# Patient Record
Sex: FEMALE | Race: WHITE | Employment: OTHER | URBAN - METROPOLITAN AREA
[De-identification: names, ages, dates, MRNs, and addresses within clinical notes are randomized per-mention and may not be internally consistent; named-entity substitution may affect disease eponyms.]

---

## 2019-03-04 ENCOUNTER — OFFICE VISIT (OUTPATIENT)
Dept: INTERNAL MEDICINE CLINIC | Age: 84
End: 2019-03-04

## 2019-03-04 ENCOUNTER — HOSPITAL ENCOUNTER (OUTPATIENT)
Dept: LAB | Age: 84
Discharge: HOME | End: 2019-03-04
Payer: MEDICARE

## 2019-03-04 DIAGNOSIS — I10 ESSENTIAL HYPERTENSION: ICD-10-CM

## 2019-03-04 DIAGNOSIS — E03.9 ACQUIRED HYPOTHYROIDISM: ICD-10-CM

## 2019-03-04 DIAGNOSIS — Z96.651 S/P TOTAL KNEE REPLACEMENT, RIGHT: ICD-10-CM

## 2019-03-04 DIAGNOSIS — Z00.00 MEDICARE ANNUAL WELLNESS VISIT, SUBSEQUENT: Primary | ICD-10-CM

## 2019-03-04 DIAGNOSIS — N39.0 RECURRENT UTI: ICD-10-CM

## 2019-03-04 PROCEDURE — 84443 ASSAY THYROID STIM HORMONE: CPT

## 2019-03-04 PROCEDURE — 36415 COLL VENOUS BLD VENIPUNCTURE: CPT

## 2019-03-04 PROCEDURE — 84439 ASSAY OF FREE THYROXINE: CPT

## 2019-03-04 PROCEDURE — 85027 COMPLETE CBC AUTOMATED: CPT

## 2019-03-04 PROCEDURE — 80061 LIPID PANEL: CPT

## 2019-03-04 PROCEDURE — 80053 COMPREHEN METABOLIC PANEL: CPT

## 2019-03-04 RX ORDER — VERAPAMIL HYDROCHLORIDE 240 MG/1
TABLET, FILM COATED, EXTENDED RELEASE ORAL
COMMUNITY
End: 2019-12-16 | Stop reason: SDUPTHER

## 2019-03-04 RX ORDER — ESTRADIOL 0.1 MG/G
2 CREAM VAGINAL DAILY
COMMUNITY

## 2019-03-04 RX ORDER — LEVOTHYROXINE SODIUM 75 UG/1
TABLET ORAL
COMMUNITY
End: 2019-07-24 | Stop reason: SDUPTHER

## 2019-03-04 RX ORDER — TRIMETHOPRIM 100 MG/1
90 TABLET ORAL DAILY
COMMUNITY
End: 2020-02-05

## 2019-03-04 RX ORDER — TROSPIUM CHLORIDE 20 MG/1
20 TABLET, FILM COATED ORAL DAILY
COMMUNITY
End: 2020-02-05

## 2019-03-04 RX ORDER — LABETALOL 200 MG/1
TABLET, FILM COATED ORAL 2 TIMES DAILY
COMMUNITY
End: 2019-07-24 | Stop reason: SDUPTHER

## 2019-03-04 RX ORDER — FAMOTIDINE 20 MG/1
20 TABLET, FILM COATED ORAL DAILY
COMMUNITY
End: 2019-03-04

## 2019-03-04 NOTE — PATIENT INSTRUCTIONS
Medicare Wellness Visit, Female The best way to live healthy is to have a lifestyle where you eat a well-balanced diet, exercise regularly, limit alcohol use, and quit all forms of tobacco/nicotine, if applicable. Regular preventive services are another way to keep healthy. Preventive services (vaccines, screening tests, monitoring & exams) can help personalize your care plan, which helps you manage your own care. Screening tests can find health problems at the earliest stages, when they are easiest to treat. [facility] follows the current, evidence-based guidelines published by the [facility] ([facility]) when recommending preventive services for our patients. Because we follow these guidelines, sometimes recommendations change over time as research supports it. (For example, mammograms used to be recommended annually. Even though Medicare will still pay for an annual mammogram, the newer guidelines recommend a mammogram every two years for women of average risk.) Of course, you and your doctor may decide to screen more often for some diseases, based on your risk and your health status. Preventive services for you include: - Medicare offers their members a free annual wellness visit, which is time for you and your primary care provider to discuss and plan for your preventive service needs. Take advantage of this benefit every year! 
-All adults over the age of 72 should receive the recommended pneumonia vaccines. Current USPSTF guidelines recommend a series of two vaccines for the best pneumonia protection.  
-All adults should have a flu vaccine yearly and a tetanus vaccine every 10 years. All adults age 61 and older should receive a shingles vaccine once in their lifetime.   
-A bone mass density test is recommended when a woman turns 65 to screen for osteoporosis. This test is only recommended one time, as a screening. Some providers will use this same test as a disease monitoring tool if you already have osteoporosis. -All adults age 38-68 who are overweight should have a diabetes screening test once every three years.  
-Other screening tests and preventive services for persons with diabetes include: an eye exam to screen for diabetic retinopathy, a kidney function test, a foot exam, and stricter control over your cholesterol.  
-Cardiovascular screening for adults with routine risk involves an electrocardiogram (ECG) at intervals determined by your doctor.  
-Colorectal cancer screenings should be done for adults age 54-65 with no increased risk factors for colorectal cancer. There are a number of acceptable methods of screening for this type of cancer. Each test has its own benefits and drawbacks. Discuss with your doctor what is most appropriate for you during your annual wellness visit. The different tests include: colonoscopy (considered the best screening method), a fecal occult blood test, a fecal DNA test, and sigmoidoscopy. -Breast cancer screenings are recommended every other year for women of normal risk, age 54-69. 
-Cervical cancer screenings for women over age 72 are only recommended with certain risk factors.  
-All adults born between [facility] should be screened once for Hepatitis C. Here is a list of your current Health Maintenance items (your personalized list of preventive services) with a due date: 
Health Maintenance Due Topic Date Due  
 DTaP/Tdap/Td  (1 - Tdap) 08[birth year]01/1955  Pneumococcal Vaccine (1 of 2 - PCV13) 08/01/1999 [facility] Annual Well Visit  03/04/2019

## 2019-03-04 NOTE — PROGRESS NOTES
HISTORY OF PRESENT ILLNESS Josephine Kirby is a 80 y.o. female. HPI She just moved here from [place] ; she lives in [facility] Subjective:  
Josephine Kirby is a 80 y.o. female with hypertension. Hypertension ROS: taking medications as instructed, no medication side effects noted, no TIA's, no chest pain on exertion, no dyspnea on exertion, no swelling of ankles. New concerns: she takes two meds for BP . SUBJECTIVE: Josephine Kirby is a 80 y.o. female here for follow up of hypothyroidism. No results found for: TSH, TSH2, TSH3, TSHP, TSHEXT Thyroid ROS: denies fatigue, weight changes, heat/cold intolerance, bowel/skin changes or CVS symptoms. She keeps having recurrent UTI and saw Dayan Salcido - she is on meds for it and has follow up in April ; on vaginal cream, She has some swaying with walking but had her knee replaced a year ago. [facility] Review of Systems Constitutional: Negative. Negative for chills, diaphoresis, fever, malaise/fatigue and weight loss. HENT: Negative for congestion, nosebleeds and tinnitus. Eyes: Negative for blurred vision, double vision and photophobia. Respiratory: Negative for cough, hemoptysis, sputum production, shortness of breath and wheezing. Cardiovascular: Negative for chest pain, palpitations, orthopnea, claudication, leg swelling and PND. Gastrointestinal: Negative for abdominal pain, blood in stool, constipation, diarrhea, heartburn, melena, nausea and vomiting. Genitourinary: Negative for dysuria, frequency, hematuria and urgency. Musculoskeletal: Negative for back pain, joint pain, myalgias and neck pain. Skin: Negative for itching and rash. Neurological: Negative for dizziness, tingling, sensory change, speech change, focal weakness, weakness and headaches. Endo/Heme/Allergies: Negative for polydipsia. Does not bruise/bleed easily. Psychiatric/Behavioral: Negative for depression.  The patient is not nervous/anxious and does not have insomnia. Physical Exam  
Constitutional: She is oriented to person, place, and time. She appears well-developed and well-nourished. HENT:  
Head: Normocephalic and atraumatic. Right Ear: External ear normal.  
Left Ear: External ear normal.  
Nose: Nose normal.  
Mouth/Throat: Oropharynx is clear and moist.  
Neck: Normal range of motion. Neck supple. No JVD present. Carotid bruit is not present. No thyroid mass and no thyromegaly present. Cardiovascular: Normal rate, regular rhythm, S1 normal, S2 normal, normal heart sounds, intact distal pulses and normal pulses. Exam reveals no gallop and no friction rub. No murmur heard. Pulmonary/Chest: Effort normal and breath sounds normal.  
Abdominal: Soft. Bowel sounds are normal.  
Musculoskeletal: Normal range of motion. Neurological: She is alert and oriented to person, place, and time. She has normal strength. Skin: Skin is warm and dry. Psychiatric: She has a normal mood and affect. Her behavior is normal. Judgment and thought content normal.  
Nursing note and vitals reviewed. ASSESSMENT and PLAN Diagnoses and all orders for this visit: 1. Essential hypertension-at goal stay on current dose -     LIPID PANEL 
-     METABOLIC PANEL, COMPREHENSIVE 2. Acquired hypothyroidism-stable at current dose 
-     TSH 3RD GENERATION 
-     T4, FREE 3. Recurrent UTI- per  - cont current meds 
-     CBC W/O DIFF 4. S/P total knee replacement, right- referred to  
-     REFERRAL TO ORTHOPEDIC SURGERY 
 
 
lab results and schedule of future lab studies reviewed with patient 
reviewed diet, exercise and weight control 
cardiovascular risk and specific lipid/LDL goals reviewed 
reviewed medications and side effects in detail This is the Subsequent Medicare Annual Wellness Exam, performed 12 months or more after the Initial AWV or the last Subsequent AWV 
 
 I have reviewed the patient's medical history in detail and updated the computerized patient record. History Past Medical History:  
Diagnosis Date  Cataract   
 bilateral eyes  Frequent urinary tract infections  H/O: hysterectomy  Hypertension  Melanoma ([address]   
 leg  Thyroid disease Past Surgical History:  
Procedure Laterality Date  HX HERNIA REPAIR    
 x2  
 HX ORTHOPAEDIC    
 bilateral TKR  HX ORTHOPAEDIC    
 left arm fracture repair  IR CHOLECYSTOSTOMY PERCUTANEOUS Current Outpatient Medications Medication Sig Dispense Refill  verapamil ER (CALAN-SR) 240 mg CR tablet Take  by mouth nightly.  cranberry fruit concentrate (AZO CRANBERRY PO) Take  by mouth.  calcium carbonate 500 mg calcium (1,250 mg) tab 500 mg, ergocalciferol (vitamin d2) 400 unit tab 200 Units Take 2 Doses by mouth daily.  multivitamin, tx-iron-ca-min (THERA-M W/ IRON) 9 mg iron-400 mcg tab tablet Take 1 Tab by mouth daily.  labetalol (NORMODYNE) 200 mg tablet Take  by mouth two (2) times a day.  levothyroxine (SYNTHROID) 75 mcg tablet Take  by mouth Daily (before breakfast).  trimethoprim (TRIMPEX) 100 mg tablet Take 90 mg by mouth daily.  trospium (SANCTURA) 20 mg tablet Take 20 mg by mouth daily.  estradiol (ESTRACE) 0.01 % (0.1 mg/gram) vaginal cream Insert 2 g into vagina daily. Allergies Allergen Reactions  Adhesive Rash  Sulfa (Sulfonamide Antibiotics) Rash Family History Problem Relation Age of Onset  Cancer Father  Cancer Brother Social History Tobacco Use  Smoking status: Never Smoker  Smokeless tobacco: Never Used Substance Use Topics  Alcohol use: No  
  Frequency: Never There is no problem list on file for this patient. Depression Risk Factor Screening:  
 
3 most recent PHQ Screens 3/4/2019 Little interest or pleasure in doing things Not at all Feeling down, depressed, irritable, or hopeless Not at all Total Score PHQ 2 0 Alcohol Risk Factor Screening: You do not drink alcohol or very rarely. Functional Ability and Level of Safety:  
Hearing Loss Hearing is good. Activities of Daily Living The home contains: handrails and grab bars Patient does total self care Fall Risk Fall Risk Assessment, last 12 mths 3/4/2019 Able to walk? Yes Fall in past 12 months? No  
 
 
Abuse Screen Patient is not abused Cognitive Screening Evaluation of Cognitive Function: 
Has your family/caregiver stated any concerns about your memory: no 
Normal 
 
Patient Care Team  
No care team member to display Assessment/Plan Education and counseling provided: 
Are appropriate based on today's review and evaluation End-of-Life planning (with patient's consent) Diagnoses and all orders for this visit: 1. Essential hypertension -     LIPID PANEL 
-     METABOLIC PANEL, COMPREHENSIVE 2. Acquired hypothyroidism 
-     TSH 3RD GENERATION 
-     T4, FREE 3. Recurrent UTI 
-     CBC W/O DIFF 4. S/P total knee replacement, right 
-     REFERRAL TO ORTHOPEDIC SURGERY Health Maintenance Due Topic Date Due  
 DTaP/Tdap/Td series (1 - Tdap) 08/01/1955  Pneumococcal 65+ Low/Medium Risk (1 of 2 - PCV13) 08/01/1999  MEDICARE YEARLY EXAM  03/04/2019 This note will not be viewable in [address]

## 2019-03-05 LAB
ALBUMIN SERPL-MCNC: 4.5 G/DL (ref 3.5–4.7)
ALBUMIN/GLOB SERPL: 1.9 {RATIO} (ref 1.2–2.2)
ALP SERPL-CCNC: 65 IU/L (ref 39–117)
ALT SERPL-CCNC: 12 IU/L (ref 0–32)
AST SERPL-CCNC: 19 IU/L (ref 0–40)
BILIRUB SERPL-MCNC: 0.5 MG/DL (ref 0–1.2)
BUN SERPL-MCNC: 29 MG/DL (ref 8–27)
BUN/CREAT SERPL: 21 (ref 12–28)
CALCIUM SERPL-MCNC: 9.8 MG/DL (ref 8.7–10.3)
CHLORIDE SERPL-SCNC: 106 MMOL/L (ref 96–106)
CHOLEST SERPL-MCNC: 157 MG/DL (ref 100–199)
CO2 SERPL-SCNC: 24 MMOL/L (ref 20–29)
CREAT SERPL-MCNC: 1.35 MG/DL (ref 0.57–1)
ERYTHROCYTE [DISTWIDTH] IN BLOOD BY AUTOMATED COUNT: 14.7 % (ref 12.3–15.4)
GLOBULIN SER CALC-MCNC: 2.4 G/DL (ref 1.5–4.5)
GLUCOSE SERPL-MCNC: 95 MG/DL (ref 65–99)
HCT VFR BLD AUTO: 36.2 % (ref 34–46.6)
HDLC SERPL-MCNC: 51 MG/DL
HGB BLD-MCNC: 12.2 G/DL (ref 11.1–15.9)
INTERPRETATION, 910389: NORMAL
INTERPRETATION: NORMAL
LDLC SERPL CALC-MCNC: 90 MG/DL (ref 0–99)
MCH RBC QN AUTO: 31.8 PG (ref 26.6–33)
MCHC RBC AUTO-ENTMCNC: 33.7 G/DL (ref 31.5–35.7)
MCV RBC AUTO: 94 FL (ref 79–97)
PDF IMAGE, 910387: NORMAL
PLATELET # BLD AUTO: 235 X10E3/UL (ref 150–379)
POTASSIUM SERPL-SCNC: 4.5 MMOL/L (ref 3.5–5.2)
PROT SERPL-MCNC: 6.9 G/DL (ref 6–8.5)
RBC # BLD AUTO: 3.84 X10E6/UL (ref 3.77–5.28)
SODIUM SERPL-SCNC: 143 MMOL/L (ref 134–144)
T4 FREE SERPL-MCNC: 1.48 NG/DL (ref 0.82–1.77)
TRIGL SERPL-MCNC: 81 MG/DL (ref 0–149)
TSH SERPL DL<=0.005 MIU/L-ACNC: 1.11 UIU/ML (ref 0.45–4.5)
VLDLC SERPL CALC-MCNC: 16 MG/DL (ref 5–40)
WBC # BLD AUTO: 5.7 X10E3/UL (ref 3.4–10.8)

## 2019-04-22 ENCOUNTER — TELEPHONE (OUTPATIENT)
Dept: INTERNAL MEDICINE CLINIC | Age: 84
End: 2019-04-22

## 2019-04-22 NOTE — TELEPHONE ENCOUNTER
Contacted pt who advised she was a little better today but would like eval for discomfort.  Appt provided

## 2019-04-22 NOTE — TELEPHONE ENCOUNTER
Patient stated that she went out shopping over a week ago. States that she has not been able to walk since that time really & is very painful. Patient is wondering if Dr. Andreina Trinidad would like to see her for this or refer her to an ortho doctor. Patient also reports having knee replacement surgery in past.         Please call patient to advise.    [phone]

## 2019-04-24 ENCOUNTER — OFFICE VISIT (OUTPATIENT)
Dept: INTERNAL MEDICINE CLINIC | Age: 84
End: 2019-04-24

## 2019-04-24 VITALS
TEMPERATURE: 98 F | WEIGHT: 189.6 LBS | DIASTOLIC BLOOD PRESSURE: 75 MMHG | OXYGEN SATURATION: 98 % | BODY MASS INDEX: 34.89 KG/M2 | RESPIRATION RATE: 18 BRPM | HEART RATE: 60 BPM | HEIGHT: 62 IN | SYSTOLIC BLOOD PRESSURE: 125 MMHG

## 2019-04-24 DIAGNOSIS — M79.605 LEFT LEG PAIN: Primary | ICD-10-CM

## 2019-04-24 DIAGNOSIS — E03.9 ACQUIRED HYPOTHYROIDISM: ICD-10-CM

## 2019-04-24 DIAGNOSIS — I10 ESSENTIAL HYPERTENSION: ICD-10-CM

## 2019-04-24 NOTE — PROGRESS NOTES
HISTORY OF PRESENT ILLNESS Hattie Lainez is a 80 y.o. female. HPI Presents with . Hypertension ROS: taking medications as instructed, no medication side effects noted, no TIA's, no chest pain on exertion, no dyspnea on exertion, no swelling of ankles. New concerns:  Patient's BP in office today is 125/75. She tries to exercise 1-2x/week (stationary bike). hypothyroidism. Lab Results Component Value Date/Time TSH 1.110 03/04/2019 12:08 PM  
 
Thyroid ROS: denies fatigue, weight changes, heat/cold intolerance, bowel/skin changes or CVS symptoms. Pt continues on Synthroid. Left Leg Pain: Pt was on feet and shopped a lot on 4/19/19 which triggered pain in left hip radiating to left knee. She notes that pain has been improving with bed rest.   
 
 
Review of Systems All other systems reviewed and are negative. Physical Exam  
Constitutional: She is oriented to person, place, and time. She appears well-developed and well-nourished. HENT:  
Head: Normocephalic and atraumatic. Right Ear: External ear normal.  
Left Ear: External ear normal.  
Nose: Nose normal.  
Mouth/Throat: Oropharynx is clear and moist.  
Eyes: Conjunctivae and EOM are normal.  
Neck: Normal range of motion. Neck supple. Carotid bruit is not present. No thyroid mass and no thyromegaly present. Cardiovascular: Normal rate, regular rhythm, S1 normal, S2 normal, normal heart sounds and intact distal pulses. Pulmonary/Chest: Effort normal and breath sounds normal.  
Abdominal: Soft. Normal appearance and bowel sounds are normal. There is no hepatosplenomegaly. There is no tenderness. Musculoskeletal: Normal range of motion. Ambulates with rollator today in clinic. Left leg weaker than right but good ROM of knee and hip Neurological: She is alert and oriented to person, place, and time. She has normal strength. No cranial nerve deficit or sensory deficit.  Coordination normal.  
 Skin: Skin is warm, dry and intact. No abrasion and no rash noted. Psychiatric: She has a normal mood and affect. Her behavior is normal. Judgment and thought content normal.  
Nursing note and vitals reviewed. ASSESSMENT and PLAN Diagnoses and all orders for this visit: 1. Left leg pain Stable, improving condition. Pt will f/u for PT. Advised pt to take Tylenol, apply heat compress, and exercise more regularly (e.g. chair yoga). She is better today than last week but PT should help 2. Acquired hypothyroidism Thyroid stable. I do not recommend a change in medications. 3. Essential hypertension BP is at goal. I do not recommend any change in medications. Lab results and schedule of future lab studies reviewed with patient. Reviewed diet, exercise and weight control. Written by Lauren Méndez, as dictated by Johanny Torres MD.  
 
Current diagnosis and concerns discussed with pt at length. Understands risks and benefits or current treatment plan and medications and accepts the treatment and medication with any possible risks. Pt asks appropriate questions which were answered. Pt instructed to call with any concerns or problems. This note will not be viewable in [address]

## 2019-07-24 DIAGNOSIS — E03.9 ACQUIRED HYPOTHYROIDISM: ICD-10-CM

## 2019-07-24 DIAGNOSIS — I10 ESSENTIAL HYPERTENSION: ICD-10-CM

## 2019-07-24 RX ORDER — LABETALOL 200 MG/1
TABLET, FILM COATED ORAL
Qty: 180 TAB | Refills: 0 | Status: SHIPPED | OUTPATIENT
Start: 2019-07-24 | End: 2020-04-11

## 2019-07-24 RX ORDER — LEVOTHYROXINE SODIUM 75 UG/1
TABLET ORAL
Qty: 90 TAB | Refills: 1 | Status: SHIPPED | OUTPATIENT
Start: 2019-07-24 | End: 2020-02-07

## 2019-09-09 ENCOUNTER — HOSPITAL ENCOUNTER (OUTPATIENT)
Dept: LAB | Age: 84
Discharge: HOME | End: 2019-09-09
Payer: MEDICARE

## 2019-09-09 ENCOUNTER — OFFICE VISIT (OUTPATIENT)
Dept: INTERNAL MEDICINE CLINIC | Age: 84
End: 2019-09-09

## 2019-09-09 VITALS
WEIGHT: 191.6 LBS | BODY MASS INDEX: 35.26 KG/M2 | OXYGEN SATURATION: 98 % | DIASTOLIC BLOOD PRESSURE: 78 MMHG | HEIGHT: 62 IN | HEART RATE: 59 BPM | TEMPERATURE: 97.1 F | RESPIRATION RATE: 16 BRPM | SYSTOLIC BLOOD PRESSURE: 138 MMHG

## 2019-09-09 DIAGNOSIS — N39.0 RECURRENT UTI: ICD-10-CM

## 2019-09-09 DIAGNOSIS — I89.0 LYMPHEDEMA: ICD-10-CM

## 2019-09-09 DIAGNOSIS — I10 ESSENTIAL HYPERTENSION: Primary | ICD-10-CM

## 2019-09-09 DIAGNOSIS — E03.9 ACQUIRED HYPOTHYROIDISM: ICD-10-CM

## 2019-09-09 PROCEDURE — 84443 ASSAY THYROID STIM HORMONE: CPT

## 2019-09-09 PROCEDURE — 80053 COMPREHEN METABOLIC PANEL: CPT

## 2019-09-09 PROCEDURE — 84439 ASSAY OF FREE THYROXINE: CPT

## 2019-09-09 PROCEDURE — 36415 COLL VENOUS BLD VENIPUNCTURE: CPT

## 2019-09-09 RX ORDER — FAMOTIDINE 20 MG/1
20 TABLET, FILM COATED ORAL 2 TIMES DAILY
COMMUNITY

## 2019-09-09 NOTE — PROGRESS NOTES
HISTORY OF PRESENT ILLNESS  Robson Bailey is a 80 y.o. female. HPI Pt presents with daughter  Hypertension ROS: taking medications as instructed, no medication side effects noted, no TIA's, no chest pain on exertion, no dyspnea on exertion, no swelling of ankles. New concerns: BP in office today is 153/81. Her BP is 138/78 in exam room. She notes that she is not walking without her walker. She is scared of falling. She is considering trying fitness classes at [facility]. She thinks her BP is high due to the new food at [facility], since she used to eat plain food at home.    hypothyroidism. Lab Results   Component Value Date/Time    TSH 1.110 03/04/2019 12:08 PM     Thyroid ROS: denies fatigue, weight changes, heat/cold intolerance, bowel/skin changes or CVS symptoms. Recurrent UTI: Pt reports that she sees Dr. Michelle Reeves, and she started Pelvic floor therapy. She has been continuing with Estrogen cream 3x weekly. She notes that she has a UTI and is going to get ABx (per Dr. Michelle Reeves) today. Her GYN informed her that her recurrent UTI's could be due to displaced bowels after her bladder prolapsed. She is continuing with high fiber (fiber 1) diet and monitoring for improvement. Lymphedema: Pt reports that she went to Dr. Donavon Jacobs 1 month ago and he informed her that her knees were fine. He would like for pt to go to a lymphedema clinic. She is concerned about the conformability of leg wrapping. Review of Systems   All other systems reviewed and are negative. Physical Exam   Constitutional: She is oriented to person, place, and time. She appears well-developed and well-nourished. HENT:   Head: Normocephalic and atraumatic. Right Ear: External ear normal.   Left Ear: External ear normal.   Nose: Nose normal.   Mouth/Throat: Oropharynx is clear and moist.   Eyes: Pupils are equal, round, and reactive to light. Conjunctivae and EOM are normal.   Neck: Normal range of motion. Neck supple. Cardiovascular: Normal rate, regular rhythm, normal heart sounds and intact distal pulses. Pulmonary/Chest: Effort normal and breath sounds normal. Right breast exhibits no inverted nipple, no mass, no nipple discharge, no skin change and no tenderness. Left breast exhibits no inverted nipple, no mass, no nipple discharge, no skin change and no tenderness. No breast swelling, tenderness, discharge or bleeding. Breasts are symmetrical.   Abdominal: Soft. Bowel sounds are normal.   Genitourinary: Rectum normal and vagina normal. Rectal exam shows anal tone normal and guaiac negative stool. No breast swelling, tenderness, discharge or bleeding. Musculoskeletal: Normal range of motion. Neurological: She is alert and oriented to person, place, and time. Skin: Skin is warm and dry. Psychiatric: She has a normal mood and affect. Her behavior is normal. Judgment and thought content normal.   Nursing note and vitals reviewed. ASSESSMENT and PLAN  Diagnoses and all orders for this visit:    1. Essential hypertension  BP is at goal with Labetalol. I do not recommend any change in medications.   -     METABOLIC PANEL, COMPREHENSIVE    2. Acquired hypothyroidism  Thyroid stable with Synthroid. I do not recommend a change in medications.  -     TSH 3RD GENERATION  -     T4, FREE    3. Recurrent UTI  Stable and well-managed with Myrbetriq. No change in medications. 4. Lymphedema  Informed pt that the permanent treatment for lymphedema is compression wrappings. Will continue to monitor for improvements or changes. Additional comments: Informed pt that her cholesterol can be checked annually due to ideal results. Lab results and schedule of future lab studies reviewed with patient. Reviewed diet, exercise and weight control. Written by Sergio Person, as dictated by Ying Stewart MD.     Current diagnosis and concerns discussed with pt at length.  Understands risks and benefits or current treatment plan and medications and accepts the treatment and medication with any possible risks. Pt asks appropriate questions which were answered. Pt instructed to call with any concerns or problems. This note will not be viewable in [address]

## 2019-09-10 LAB
ALBUMIN SERPL-MCNC: 4.5 G/DL (ref 3.5–4.7)
ALBUMIN/GLOB SERPL: 2.1 {RATIO} (ref 1.2–2.2)
ALP SERPL-CCNC: 72 IU/L (ref 39–117)
ALT SERPL-CCNC: 12 IU/L (ref 0–32)
AST SERPL-CCNC: 18 IU/L (ref 0–40)
BILIRUB SERPL-MCNC: 0.8 MG/DL (ref 0–1.2)
BUN SERPL-MCNC: 29 MG/DL (ref 8–27)
BUN/CREAT SERPL: 21 (ref 12–28)
CALCIUM SERPL-MCNC: 10.1 MG/DL (ref 8.7–10.3)
CHLORIDE SERPL-SCNC: 103 MMOL/L (ref 96–106)
CO2 SERPL-SCNC: 22 MMOL/L (ref 20–29)
CREAT SERPL-MCNC: 1.39 MG/DL (ref 0.57–1)
GLOBULIN SER CALC-MCNC: 2.1 G/DL (ref 1.5–4.5)
GLUCOSE SERPL-MCNC: 86 MG/DL (ref 65–99)
INTERPRETATION: NORMAL
POTASSIUM SERPL-SCNC: 4.5 MMOL/L (ref 3.5–5.2)
PROT SERPL-MCNC: 6.6 G/DL (ref 6–8.5)
SODIUM SERPL-SCNC: 139 MMOL/L (ref 134–144)
T4 FREE SERPL-MCNC: 1.52 NG/DL (ref 0.82–1.77)
TSH SERPL DL<=0.005 MIU/L-ACNC: 2.03 UIU/ML (ref 0.45–4.5)

## 2019-10-20 ENCOUNTER — HOSPITAL ENCOUNTER (EMERGENCY)
Age: 84
Discharge: HOME | End: 2019-10-20
Attending: EMERGENCY MEDICINE
Payer: MEDICARE

## 2019-10-20 VITALS
SYSTOLIC BLOOD PRESSURE: 184 MMHG | DIASTOLIC BLOOD PRESSURE: 81 MMHG | TEMPERATURE: 98.1 F | OXYGEN SATURATION: 100 % | RESPIRATION RATE: 16 BRPM | HEART RATE: 64 BPM

## 2019-10-20 DIAGNOSIS — K22.2 ESOPHAGEAL OBSTRUCTION DUE TO FOOD IMPACTION: Primary | ICD-10-CM

## 2019-10-20 DIAGNOSIS — T18.128A ESOPHAGEAL OBSTRUCTION DUE TO FOOD IMPACTION: Primary | ICD-10-CM

## 2019-10-20 PROCEDURE — 99282 EMERGENCY DEPT VISIT SF MDM: CPT

## 2019-10-20 NOTE — ED NOTES
Patient discharged from ED by provider. Discharge instructions reviewed with patient and all questions answered. Patient ambulatory from ED in [facility].

## 2019-10-20 NOTE — ED TRIAGE NOTES
Pt reports \"I went out to eat around six o'clock this evening and it just feels like the food is clogged\". Pt able to talk in complete sentences w/o signs of resp distress.

## 2019-10-20 NOTE — ED PROVIDER NOTES
Dewey Correia is an 81 yo F with senstion of food stuck in her esophagus. She states that it occurred while eating dinner tonight. She has not been able to keep anything down since and has been spitting up her saliva. She originally called 911 and was in the ambulance being transported and she then felt the bumpy ride had moved the food and she asked them to take her back home but once she got home and tried to lie down the sensation returned and she continued to spit up her saliva.              Past Medical History:   Diagnosis Date    Cataract     bilateral eyes    Frequent urinary tract infections     H/O: hysterectomy     Hypertension     Melanoma ([facility] [address]     leg    Thyroid disease        Past Surgical History:   Procedure Laterality Date    HX HERNIA REPAIR      x2    HX ORTHOPAEDIC      bilateral TKR    HX ORTHOPAEDIC      left arm fracture repair    IR CHOLECYSTOSTOMY PERCUTANEOUS           Family History:   Problem Relation Age of Onset    Cancer Father     Cancer Brother        Social History     Socioeconomic History    Marital status:      Spouse name: Not on file    Number of children: Not on file    Years of education: Not on file    Highest education level: Not on file   Occupational History    Not on file   Social Needs    Financial resource strain: Not on file    Food insecurity:     Worry: Not on file     Inability: Not on file    Transportation needs:     Medical: Not on file     Non-medical: Not on file   Tobacco Use    Smoking status: Never Smoker    Smokeless tobacco: Never Used   Substance and Sexual Activity    Alcohol use: No     Frequency: Never    Drug use: No    Sexual activity: Not on file   Lifestyle    Physical activity:     Days per week: Not on file     Minutes per session: Not on file    Stress: Not on file   Relationships    Social connections:     Talks on phone: Not on file     Gets together: Not on file     Attends [employer] service: Not on file     Active member of club or organization: Not on file     Attends meetings of clubs or organizations: Not on file     Relationship status: Not on file    Intimate partner violence:     Fear of current or ex partner: Not on file     Emotionally abused: Not on file     Physically abused: Not on file     Forced sexual activity: Not on file   Other Topics Concern    Not on file   Social History Narrative    Not on file         ALLERGIES: Adhesive and Sulfa (sulfonamide antibiotics)    Review of Systems   Constitutional: Negative for fever. HENT: Positive for trouble swallowing. Negative for sore throat. Eyes: Negative for visual disturbance. Respiratory: Negative for cough. Cardiovascular: Negative for chest pain. Gastrointestinal: Negative for abdominal pain. Genitourinary: Negative for dysuria. Musculoskeletal: Negative for back pain. Skin: Negative for rash. Neurological: Negative for headaches. Vitals:    10/20/19 0109   BP: (!) 231/105   Pulse: 64   Resp: 16   Temp: 98.1 °F (36.7 °C)   SpO2: 100%            Physical Exam   Constitutional: She appears well-developed and well-nourished. No distress. Occasionally Spitting out saliva into tissue   HENT:   Head: Normocephalic and atraumatic. Mouth/Throat: Oropharynx is clear and moist.   Eyes: Conjunctivae and EOM are normal.   Neck: Normal range of motion and phonation normal.   Cardiovascular: Normal rate. Pulmonary/Chest: Effort normal. No respiratory distress. Abdominal: Soft. She exhibits no distension. There is no tenderness. Musculoskeletal: Normal range of motion. She exhibits no tenderness. Neurological: She is alert. She is not disoriented. She exhibits normal muscle tone. Skin: Skin is warm and dry. Nursing note and vitals reviewed. MDM        1:50 AM  PAtient reassessed and states that her symptoms have resolved after fizz-ease prior to receiving glucagon. Will hold glucagon and labs.  Give PO challenge. 2:06 AM  Patient tolerating crackers without difficulty. Discussed need for GI follow-up. Chewing food completely.       Procedures

## 2019-10-20 NOTE — ED NOTES
Patient given fizz-ease.  Patient reported that it didn't feel like first sip went down but the second one fixed the feeling of food in her throat

## 2019-11-04 ENCOUNTER — TELEPHONE (OUTPATIENT)
Dept: INTERNAL MEDICINE CLINIC | Age: 84
End: 2019-11-04

## 2019-11-04 NOTE — TELEPHONE ENCOUNTER
Patient called to report that she was seen by [facility] 10/20/2019 for esophagus obstruction. Patient reports that she was referred by ER to f/u and see DR. Dyer 128 & request to know if PCP recommends or can recommend another gastro doctor that is close to [facility] or DR. Carlton's location. Please call patient to advise.    [phone]

## 2019-11-05 NOTE — TELEPHONE ENCOUNTER
Spoke with patient and recommended Dr. Sincere Velarde or anyone with [facility]. Gave patient contact information. Pt understood and was thankful for the call.

## 2019-12-16 DIAGNOSIS — I10 ESSENTIAL HYPERTENSION: ICD-10-CM

## 2019-12-16 RX ORDER — VERAPAMIL HYDROCHLORIDE 240 MG/1
TABLET, FILM COATED, EXTENDED RELEASE ORAL
Qty: 90 TAB | Refills: 1 | Status: SHIPPED | OUTPATIENT
Start: 2019-12-16 | End: 2020-07-07

## 2020-02-05 NOTE — PERIOP NOTES
[address]  Endoscopy Preprocedure Instructions      1. On the day of your surgery, please report to registration located on the 2nd floor of the  [facility]. yes    2. You must have a responsible adult to drive you to the hospital, stay at the hospital during your procedure and drive you home. If they leave your procedure will not be started (no exceptions). yes    3. Do not have anything to eat or drink (including water, gum, mints, coffee, and juice) after midnight. This does not apply to the medications you were instructed to take by your physician. yesIf you are currently taking Plavix, Coumadin, Aspirin, or other blood-thinning agents, contact your physician for special instructions. yes,    4. If you are having a procedure that requires bowel prep: We recommend that you have only clear liquids the day before your procedure and begin your bowel prep by 5:00 pm.  You may continue to drink clear liquids until midnight. If for any reason you are not able to complete your prep please notify your physician immediately. not applicable    5. Have a list of all current medications, including vitamins, herbal supplements and any other over the counter medications. yes    6. If you wear glasses, contacts, dentures and/or hearing aids, they may be removed prior to procedure, please bring a case to store them in. yes    7. You should understand that if you do not follow these instructions your procedure may be cancelled. If your physical condition changes (I.e. fever, cold or flu) please contact your doctor as soon as possible. 8. It is important that you be on time.   If for any reason you are unable to keep your appointment please call [phone] the day of or your physicians office prior to your scheduled procedure

## 2020-02-06 ENCOUNTER — ANESTHESIA (OUTPATIENT)
Dept: ENDOSCOPY | Age: 85
End: 2020-02-06
Payer: MEDICARE

## 2020-02-06 ENCOUNTER — ANESTHESIA EVENT (OUTPATIENT)
Dept: ENDOSCOPY | Age: 85
End: 2020-02-06
Payer: MEDICARE

## 2020-02-06 ENCOUNTER — HOSPITAL ENCOUNTER (OUTPATIENT)
Age: 85
Setting detail: OUTPATIENT SURGERY
Discharge: HOME | End: 2020-02-06
Attending: INTERNAL MEDICINE | Admitting: INTERNAL MEDICINE
Payer: MEDICARE

## 2020-02-06 VITALS
TEMPERATURE: 97.8 F | BODY MASS INDEX: 35.66 KG/M2 | OXYGEN SATURATION: 99 % | RESPIRATION RATE: 17 BRPM | HEIGHT: 62 IN | DIASTOLIC BLOOD PRESSURE: 74 MMHG | SYSTOLIC BLOOD PRESSURE: 128 MMHG | HEART RATE: 53 BPM | WEIGHT: 193.78 LBS

## 2020-02-06 PROCEDURE — C1726 CATH, BAL DIL, NON-VASCULAR: HCPCS | Performed by: INTERNAL MEDICINE

## 2020-02-06 PROCEDURE — 74011000250 HC RX REV CODE- 250: Performed by: NURSE ANESTHETIST, CERTIFIED REGISTERED

## 2020-02-06 PROCEDURE — 74011250636 HC RX REV CODE- 250/636: Performed by: NURSE ANESTHETIST, CERTIFIED REGISTERED

## 2020-02-06 PROCEDURE — 74011250636 HC RX REV CODE- 250/636: Performed by: INTERNAL MEDICINE

## 2020-02-06 PROCEDURE — 76040000019: Performed by: INTERNAL MEDICINE

## 2020-02-06 PROCEDURE — 76060000031 HC ANESTHESIA FIRST 0.5 HR: Performed by: INTERNAL MEDICINE

## 2020-02-06 PROCEDURE — 77030018712 HC DEV BLLN INFL BSC -B: Performed by: INTERNAL MEDICINE

## 2020-02-06 RX ORDER — FLUMAZENIL 0.1 MG/ML
0.2 INJECTION INTRAVENOUS
Status: DISCONTINUED | OUTPATIENT
Start: 2020-02-06 | End: 2020-02-06 | Stop reason: HOSPADM

## 2020-02-06 RX ORDER — PROPOFOL 10 MG/ML
INJECTION, EMULSION INTRAVENOUS AS NEEDED
Status: DISCONTINUED | OUTPATIENT
Start: 2020-02-06 | End: 2020-02-06 | Stop reason: HOSPADM

## 2020-02-06 RX ORDER — ATROPINE SULFATE 0.1 MG/ML
0.4 INJECTION INTRAVENOUS
Status: DISCONTINUED | OUTPATIENT
Start: 2020-02-06 | End: 2020-02-06 | Stop reason: HOSPADM

## 2020-02-06 RX ORDER — SODIUM CHLORIDE 9 MG/ML
50 INJECTION, SOLUTION INTRAVENOUS CONTINUOUS
Status: DISCONTINUED | OUTPATIENT
Start: 2020-02-06 | End: 2020-02-06 | Stop reason: HOSPADM

## 2020-02-06 RX ORDER — PROPOFOL 10 MG/ML
INJECTION, EMULSION INTRAVENOUS
Status: DISCONTINUED | OUTPATIENT
Start: 2020-02-06 | End: 2020-02-06 | Stop reason: HOSPADM

## 2020-02-06 RX ORDER — NALOXONE HYDROCHLORIDE 0.4 MG/ML
0.4 INJECTION, SOLUTION INTRAMUSCULAR; INTRAVENOUS; SUBCUTANEOUS
Status: DISCONTINUED | OUTPATIENT
Start: 2020-02-06 | End: 2020-02-06 | Stop reason: HOSPADM

## 2020-02-06 RX ORDER — EPINEPHRINE 0.1 MG/ML
1 INJECTION INTRACARDIAC; INTRAVENOUS
Status: DISCONTINUED | OUTPATIENT
Start: 2020-02-06 | End: 2020-02-06 | Stop reason: HOSPADM

## 2020-02-06 RX ORDER — MIDAZOLAM HYDROCHLORIDE 1 MG/ML
.25-5 INJECTION, SOLUTION INTRAMUSCULAR; INTRAVENOUS
Status: DISCONTINUED | OUTPATIENT
Start: 2020-02-06 | End: 2020-02-06 | Stop reason: HOSPADM

## 2020-02-06 RX ORDER — LIDOCAINE HYDROCHLORIDE 20 MG/ML
INJECTION, SOLUTION EPIDURAL; INFILTRATION; INTRACAUDAL; PERINEURAL AS NEEDED
Status: DISCONTINUED | OUTPATIENT
Start: 2020-02-06 | End: 2020-02-06 | Stop reason: HOSPADM

## 2020-02-06 RX ORDER — DEXTROMETHORPHAN/PSEUDOEPHED 2.5-7.5/.8
1.2 DROPS ORAL
Status: DISCONTINUED | OUTPATIENT
Start: 2020-02-06 | End: 2020-02-06 | Stop reason: HOSPADM

## 2020-02-06 RX ADMIN — SODIUM CHLORIDE 50 ML/HR: 900 INJECTION, SOLUTION INTRAVENOUS at 11:00

## 2020-02-06 RX ADMIN — LIDOCAINE HYDROCHLORIDE 40 MG: 20 INJECTION, SOLUTION INTRAVENOUS at 12:10

## 2020-02-06 RX ADMIN — PROPOFOL 140 MCG/KG/MIN: 10 INJECTION, EMULSION INTRAVENOUS at 12:11

## 2020-02-06 RX ADMIN — PROPOFOL 80 MG: 10 INJECTION, EMULSION INTRAVENOUS at 12:11

## 2020-02-06 NOTE — ANESTHESIA PREPROCEDURE EVALUATION
Relevant Problems   No relevant active problems       Anesthetic History   No history of anesthetic complications            Review of Systems / Medical History  Patient summary reviewed, nursing notes reviewed and pertinent labs reviewed    Pulmonary        Sleep apnea: CPAP           Neuro/Psych   Within defined limits           Cardiovascular    Hypertension                   GI/Hepatic/Renal                Endo/Other      Hypothyroidism  Morbid obesity and cancer (MELANOMA LEG)     Other Findings              Physical Exam    Airway  Mallampati: III  TM Distance: 4 - 6 cm  Neck ROM: normal range of motion   Mouth opening: Normal     Cardiovascular    Rhythm: regular  Rate: normal         Dental  No notable dental hx       Pulmonary  Breath sounds clear to auscultation               Abdominal         Other Findings            Anesthetic Plan    ASA: 3  Anesthesia type: MAC          Induction: Intravenous  Anesthetic plan and risks discussed with: Patient

## 2020-02-06 NOTE — PERIOP NOTES
1211  Anesthesia staff at patient's bedside administering anesthesia and monitoring patients vital signs throughout procedure. See anesthesia note. 46  Endoscope was pre-cleaned at bedside immediately following procedure by Heather Villar, JESSE, endo tech. 1222  Patient tolerated procedure without problems. Abdomen soft and patient arousable and voices no complaints Report received from CRNA, see anesthesia note. Patient transported to endoscopy recovery area. Report given to ISABELLA SORENSEN RN. CRE balloon dilatation of the esophagus   18 mm Balloon inflated to 3 ATMs and held for 4 seconds. 19 mm Balloon inflated to 4.5 ATMs and held for 10 seconds. 20 mm Balloon inflated to 6 ATMs and held for 10 seconds. No subcutaneous crepitus of the chest or cervical region was noted post dilatation.

## 2020-02-06 NOTE — PROCEDURES
Jorge Hager M.D.  [phone]           2020                EGD Operative Report  Claudius Gowers  :  1934  [facility] Medical Record Number:  [id number]      Indication:  Dysphagia/odynophagia     : Pete Ibanez MD    Referring Provider:  Toby Guerrero MD      Anesthesia/Sedation:  MAC anesthesia    Airway assessment: No airway problems anticipated    Pre-Procedural Exam:      Airway: clear, no airway problems anticipated  Heart: RRR, without gallops or rubs  Lungs: clear bilaterally without wheezes, crackles, or rhonchi  Abdomen: soft, nontender, nondistended, bowel sounds present  Mental Status: awake, alert and oriented to person, place and time       Procedure Details     After infomed consent was obtained for the procedure, with all risks and benefits of procedure explained the patient was taken to the endoscopy suite and placed in the left lateral decubitus position. Following sequential administration of sedation as per above, the endoscope was inserted into the mouth and advanced under direct vision to second portion of the duodenum. A careful inspection was made as the gastroscope was withdrawn, including a retroflexed view of the proximal stomach; findings and interventions are described below. Findings:   Esophagus:Evidence of a patent schatzki's ring about 15 mm in diameter, otherwise mucosa within normal  Stomach: Small size hiatal hernia, otherwise mucosa within normal  Duodenum/jejunum: normal    Therapies:  esophageal dilation with 18 to 20 mm  sized balloon    Specimens: none           Complications:   None; patient tolerated the procedure well. EBL:  None.            Impression:    Hiatal hernia and schatzki's ring that was dilated to 20 mm     Recommendations:    -Continue acid suppression.  -Continue with soft diet  -Continue with anti-reflux measures    Bharat Landeros MD

## 2020-02-06 NOTE — PROGRESS NOTES
Sandy Mosquera  1934  [id number]    Situation:  Verbal report received from:   Lindsey Patrick RN   Procedure: Procedure(s):  ESOPHAGOGASTRODUODENOSCOPY (EGD)    Background:    Preoperative diagnosis: DYSPHAGIA  Postoperative diagnosis: * No post-op diagnosis entered *    :  Dr. Loyd Valle  Assistant(s): Endoscopy RN-1: Yenny Burgos RN    Specimens: * No specimens in log *  H. Pylori  no    Assessment:  Intra-procedure medications       Anesthesia gave intra-procedure sedation and medications, see anesthesia flow sheet yes    Intravenous fluids: NS@ KVO     Vital signs stable   yes    Abdominal assessment: round and soft   yes    Recommendation:  Discharge patient per MD order  yes.   Return to floor outpatient   Family or Friend   Spouse and daughter   Permission to share finding with family or friend yes

## 2020-02-06 NOTE — H&P
The patient is a 80year old female who presents with a complaint of Dysphagia. The patient presents for consultation at the request of Dr. Yanira Seo). The onset of the dysphagia has been chronic and has been occurring for years. The dysphagia is described as being located in the substernal  area. The symptoms are aggravated by solids only. Note for \"Dysphagia\": She was in the ER in October 2019 for similar symptoms and resolved with fizzy drink. She denies a long history of GERD, was given famotidine in the past but not lately. She does not think she had an endoscopy done but thinks she had it with her colonoscopy. She has had colonoscopies done in the past. She denies any regurgitation or nausea or vomiting, denies any weight loss. Problem List/Past Medical Get Pruitt; 1/30/2020 2:09 PM)  Hypertension    Hypothyroidism      Allergies (Get Pruitt; 1/30/2020 2:09 PM)  Sulfa Drugs    Adheres Underpad *MEDICAL DEVICES AND SUPPLIES*      Medication History (Get Pruitt; 1/30/2020 2:12 PM)  Verapamil HCl  (240MG (CO) Tablet ER, Oral daily) Active. Myrbetriq  (50MG Tablet ER 24HR, Oral daily) Active. Oscal 500/200 D-3  (500-200MG-UNIT Tablet, Oral daily) Active. Centrum Adults  (Oral daily) Active. Labetalol HCl  (200MG Tablet, Oral daily) Active. Levothyroxine Sodium  (75MCG Tablet, Oral daily) Active. Medications Reconciled     Social History Get Pruitt; 1/30/2020 2:12 PM)  Blood Transfusion   No.  Drug Use   no  Alcohol Use   no  Tobacco Use   no  Marital status   . Health Maintenance History Get Pruitt; 1/30/2020 2:12 PM)  Flu Vaccine   yes  Pneumovax   no        Review of Systems Get Pruitt; 1/30/2020 2:05 PM)  General Not Present- Chronic Fatigue, Poor Appetite, Weight Gain and Weight Loss. Skin Not Present- Itching, Rash and Skin Color Changes. HEENT Not Present- Hearing Loss and Vertigo.   Respiratory Not Present- Difficulty Breathing and TB exposure. Cardiovascular Not Present- Chest Pain, Use of Antibiotics before Dental Procedures and Use of Blood Thinners. Gastrointestinal Present- See HPI. Musculoskeletal Not Present- Arthritis, Hip Replacement Surgery and Knee Replacement Surgery. Neurological Not Present- Weakness. Psychiatric Not Present- Depression. Endocrine Not Present- Diabetes and Thyroid Problems. Hematology Not Present- Anemia. Vitals Surekha Szymanski; 1/30/2020 2:04 PM)  1/30/2020 2:03 PM  Weight: 195 lb   Height: 62 in   Body Surface Area: 1.89 m²   Body Mass Index: 35.67 kg/m²    BP: 160/82 (Sitting, Left Arm, Standard)              Physical Exam (Bharat Casas MD; 1/30/2020 2:36 PM)  General  Mental Status - Alert. General Appearance - Cooperative, Pleasant, Not in acute distress. Orientation - Oriented X3. Build & Nutrition - Well nourished and Well developed. Integumentary  General Characteristics  Overall examination of the patient's skin reveals - no rashes, no bruises and no spider angiomas. Color - normal coloration of skin. Head and Neck  Neck  Global Assessment - full range of motion and supple, no bruit auscultated on the right, no bruit auscultated on the left, non-tender, no lymphadenopathy. Thyroid  Gland Characteristics - normal size and consistency. Eye  Eyeball - Left - No Exophthalmos. Eyeball - Right - No Exophthalmos. Sclera/Conjunctiva - Left - No Jaundice. Sclera/Conjunctiva - Right - No Jaundice. Chest and Lung Exam  Chest and lung exam reveals  - quiet, even and easy respiratory effort with no use of accessory muscles. Auscultation  Breath sounds - Normal. Adventitious sounds - No Adventitious sounds. Cardiovascular  Auscultation  Rhythm - Regular, No Tachycardia, No Bradycardia . Heart Sounds - Normal heart sounds , S1 WNL and S2 WNL, No S3, No Summation Gallop. Murmurs & Other Heart Sounds - Auscultation of the heart reveals - No Murmurs.     Abdomen  Inspection  Inspection of the abdomen reveals - Non-distended. Palpation/Percussion  Tenderness - Non-Tender. Rebound tenderness - No rebound. Liver - No hepatosplenomegaly. Abdominal Mass Palpable - No masses. Other Characteristics - No Ascites. Organomegally - None. Auscultation  Auscultation of the abdomen reveals - Bowel sounds normal, No Abdominal bruits and No Succussion splash. Rectal - Did not examine. Peripheral Vascular  Upper Extremity  Inspection - Left - Normal - No Clubbing, No Cyanosis, No Edema, Pulses Intact. Right - Normal - No Clubbing, No Cyanosis, No Edema, Pulses Intact. Palpation - Edema - Left - No edema. Right - No edema. Lower Extremity  Inspection - Left - Inspection Normal. Right - Inspection Normal. Palpation - Edema - Left - No edema. Right - No edema. Neurologic  Neurologic evaluation reveals  - Cranial nerves grossly intact, no focal neurologic deficits. Motor  Involuntary Movements - Asterixis - not present. Musculoskeletal  Global Assessment  Gait and Station - normal gait and station. Assessment & Plan (Bharat Valle MD; 1/30/2020 2:39 PM)  Dysphagia (787.20  R13.10)  Impression: Suspected schatzki's ring, will proceed with EGD and dilation. Advised her to resume pepcid. Instructed to eat slowly, chew food very well and have sips of water with meals. Current Plans  ENDOSCOPY, UPPER GI, DIAGNOSTIC (97698) (Discussed risks and benefits with the patient to include: perforation,or post biopsy bleeding, missed lesions, and sedation reactions.)  Pt Education - How to access health information online: discussed with patient and provided information. Patient is to call me for any questions or concerns.

## 2020-02-06 NOTE — ANESTHESIA POSTPROCEDURE EVALUATION
Procedure(s):  ESOPHAGOGASTRODUODENOSCOPY (EGD)  ESOPHAGEAL DILATION. MAC    Anesthesia Post Evaluation      Multimodal analgesia: multimodal analgesia not used between 6 hours prior to anesthesia start to PACU discharge  Patient location during evaluation: bedside  Patient participation: complete - patient participated  Level of consciousness: awake  Pain management: adequate  Airway patency: patent  Anesthetic complications: no  Cardiovascular status: acceptable  Respiratory status: acceptable  Hydration status: acceptable  Post anesthesia nausea and vomiting:  controlled      Vitals Value Taken Time   /65 2/6/2020 12:45 PM   Temp 36.6 °C (97.8 °F) 2/6/2020 12:25 PM   Pulse 53 2/6/2020 12:48 PM   Resp 16 2/6/2020 12:48 PM   SpO2 97 % 2/6/2020 12:48 PM   Vitals shown include unvalidated device data.

## 2020-02-06 NOTE — DISCHARGE INSTRUCTIONS
Danette Saint, M.D.  [phone]           2020  Mervin Brunner  :  1934  [facility] Medical Record Number:  [id number]        ENDOSCOPY FINDINGS:   Your endoscopy showed a hiatal hernia and a benign narrowing that was dilated. EGD DISCHARGE INSTRUCTIONS    DISCOMFORT:  Sore throat- throat lozenges or warm salt water gargle  redness at IV site- apply warm compress to area; if redness or soreness persist- contact your physician  Gaseous discomfort- walking, belching will help relieve any discomfort  You may not operate a vehicle for 12 hours  You may not engage in an occupation involving machinery or appliances for rest of today  You may not drink alcoholic beverages for at least 12 hours  Avoid making any critical decisions for at least 24 hour    DIET:   You may resume pureed diet today and advance to soft diet tomorrow. ACTIVITY  Spend the remainder of the day resting -  avoid any strenuous activity. Avoid driving or operating machinery. CALL M.D. ANY SIGN OF   Increasing pain, nausea, vomiting  Abdominal distension (swelling)  New increased bleeding (oral or rectal)  Fever (chills)  Pain in chest area  Bloody discharge from nose or mouth  Shortness of breath    Follow-up Instructions:   Call Dr. Beni Graham for any questions or problems. Telephone # [phone]     Continue same medications. Follow up in the office as needed.

## 2020-02-07 DIAGNOSIS — E03.9 ACQUIRED HYPOTHYROIDISM: ICD-10-CM

## 2020-02-07 RX ORDER — LEVOTHYROXINE SODIUM 75 UG/1
TABLET ORAL
Qty: 90 TAB | Refills: 1 | Status: SHIPPED | OUTPATIENT
Start: 2020-02-07 | End: 2020-07-31

## 2020-02-28 ENCOUNTER — OFFICE VISIT (OUTPATIENT)
Dept: INTERNAL MEDICINE CLINIC | Age: 85
End: 2020-02-28

## 2020-02-28 DIAGNOSIS — J06.9 VIRAL UPPER RESPIRATORY TRACT INFECTION: ICD-10-CM

## 2020-02-28 DIAGNOSIS — R68.89 FLU-LIKE SYMPTOMS: Primary | ICD-10-CM

## 2020-02-28 LAB
FLUAV+FLUBV AG NOSE QL IA.RAPID: NEGATIVE POS/NEG
FLUAV+FLUBV AG NOSE QL IA.RAPID: NEGATIVE POS/NEG
VALID INTERNAL CONTROL?: YES

## 2020-02-28 RX ORDER — AZITHROMYCIN 250 MG/1
250 TABLET, FILM COATED ORAL SEE ADMIN INSTRUCTIONS
Qty: 6 TAB | Refills: 0 | Status: SHIPPED | OUTPATIENT
Start: 2020-02-28 | End: 2020-03-04

## 2020-02-28 NOTE — PATIENT INSTRUCTIONS
Viral Respiratory Infection: Care Instructions Your Care Instructions Viruses are very small organisms. They grow in number after they enter your body. There are many types that cause different illnesses, such as colds and the mumps. The symptoms of a viral respiratory infection often start quickly. They include a fever, sore throat, and runny nose. You may also just not feel well. Or you may not want to eat much. Most viral respiratory infections are not serious. They usually get better with time and self-care. Antibiotics are not used to treat a viral infection. That's because antibiotics will not help cure a viral illness. In some cases, antiviral medicine can help your body fight a serious viral infection. Follow-up care is a key part of your treatment and safety. Be sure to make and go to all appointments, and call your doctor if you are having problems. It's also a good idea to know your test results and keep a list of the medicines you take. How can you care for yourself at home? · Rest as much as possible until you feel better. · Be safe with medicines. Take your medicine exactly as prescribed. Call your doctor if you think you are having a problem with your medicine. You will get more details on the specific medicine your doctor prescribes. · Take an over-the-counter pain medicine, such as acetaminophen (Tylenol), ibuprofen (Advil, Motrin), or naproxen (Aleve), as needed for pain and fever. Read and follow all instructions on the label. Do not give aspirin to anyone younger than 20. It has been linked to Reye syndrome, a serious illness. · Drink plenty of fluids, enough so that your urine is light yellow or clear like water. Hot fluids, such as tea or soup, may help relieve congestion in your nose and throat. If you have kidney, heart, or liver disease and have to limit fluids, talk with your doctor before you increase the amount of fluids you drink. · Try to clear mucus from your lungs by breathing deeply and coughing. · Gargle with warm salt water once an hour. This can help reduce swelling and throat pain. Use 1 teaspoon of salt mixed in 1 cup of warm water. · Do not smoke or allow others to smoke around you. If you need help quitting, talk to your doctor about stop-smoking programs and medicines. These can increase your chances of quitting for good. To avoid spreading the virus · Cough or sneeze into a tissue. Then throw the tissue away. · If you don't have a tissue, use your hand to cover your cough or sneeze. Then clean your hand. You can also cough into your sleeve. · Wash your hands often. Use soap and warm water. Wash for 15 to 20 seconds each time. · If you don't have soap and water near you, you can clean your hands with alcohol wipes or gel. When should you call for help? Call your doctor now or seek immediate medical care if: 
  · You have a new or higher fever.  
  · Your fever lasts more than 48 hours.  
  · You have trouble breathing.  
  · You have a fever with a stiff neck or a severe headache.  
  · You are sensitive to light.  
  · You feel very sleepy or confused.  
 Watch closely for changes in your health, and be sure to contact your doctor if: 
  · You do not get better as expected. Where can you learn more? Go to http://taqueria-uriel.info/. Enter C510 in the search box to learn more about \"Viral Respiratory Infection: Care Instructions. \" Current as of: June 9, 2019 Content Version: 12.2 © 8139-2271 [employer]. Care instructions adapted under license by [employer] (which disclaims liability or warranty for this information). If you have questions about a medical condition or this instruction, always ask your healthcare professional. [address] any warranty or liability for your use of this information.

## 2020-02-29 VITALS
HEIGHT: 62 IN | OXYGEN SATURATION: 99 % | SYSTOLIC BLOOD PRESSURE: 140 MMHG | BODY MASS INDEX: 35.44 KG/M2 | HEART RATE: 67 BPM | RESPIRATION RATE: 12 BRPM | DIASTOLIC BLOOD PRESSURE: 84 MMHG | TEMPERATURE: 98.7 F

## 2020-02-29 RX ORDER — GUAIFENESIN 600 MG/1
600 TABLET, EXTENDED RELEASE ORAL 2 TIMES DAILY
Qty: 30 TAB | Refills: 0
Start: 2020-02-29 | End: 2020-11-12

## 2020-02-29 NOTE — PROGRESS NOTES
HISTORY OF PRESENT ILLNESS  Meli Valle is a 80 y.o. female. HPI  Upper respiratory illness:  Meli Valle presents with complaints of congestion, sore throat, dry cough and headache for 1 day. Her  has been ill with same symptoms for the past week and is being seen today as well. no nausea and no vomiting . she has not had  myalgias, fever and chills. Symptoms are moderate. Over-the-counter remedies  has never been tried. Drinking plenty of fluids: yes  Asthma?:  no  non-smoker  Contacts with similar infections: yes -- her  is ill with similar symptoms. There is no problem list on file for this patient.     Past Surgical History:   Procedure Laterality Date    HX HERNIA REPAIR      x2    HX ORTHOPAEDIC      bilateral TKR    HX ORTHOPAEDIC      left arm fracture repair    IR CHOLECYSTOSTOMY PERCUTANEOUS       Social History     Socioeconomic History    Marital status:      Spouse name: Not on file    Number of children: Not on file    Years of education: Not on file    Highest education level: Not on file   Occupational History    Not on file   Social Needs    Financial resource strain: Not on file    Food insecurity:     Worry: Not on file     Inability: Not on file    Transportation needs:     Medical: Not on file     Non-medical: Not on file   Tobacco Use    Smoking status: Never Smoker    Smokeless tobacco: Never Used   Substance and Sexual Activity    Alcohol use: No     Frequency: Never    Drug use: No    Sexual activity: Not on file   Lifestyle    Physical activity:     Days per week: Not on file     Minutes per session: Not on file    Stress: Not on file   Relationships    Social connections:     Talks on phone: Not on file     Gets together: Not on file     Attends Buddhist service: Not on file     Active member of club or organization: Not on file     Attends meetings of clubs or organizations: Not on file     Relationship status: Not on file   [place] More Intimate partner violence:     Fear of current or ex partner: Not on file     Emotionally abused: Not on file     Physically abused: Not on file     Forced sexual activity: Not on file   Other Topics Concern    Not on file   Social History Narrative    Not on file     Family History   Problem Relation Age of Onset    Cancer Father     Cancer Brother      Current Outpatient Medications   Medication Sig    guaiFENesin ER (MUCINEX) 600 mg ER tablet Take 1 Tab by mouth two (2) times a day.  azithromycin (ZITHROMAX) 250 mg tablet Take 1 Tab by mouth See Admin Instructions for 5 days.  levothyroxine (SYNTHROID) 75 mcg tablet TAKE 1 TABLET BY MOUTH EVERY DAY    B.infantis-B.ani-B.long-B.bifi (PROBIOTIC 4X) 10-15 mg TbEC Take  by mouth daily.  verapamil ER (CALAN-SR) 240 mg CR tablet TAKE 1 TABLET BY MOUTH EVERY DAY    famotidine (PEPCID) 20 mg tablet Take 20 mg by mouth two (2) times a day.  mirabegron ER (MYRBETRIQ) 25 mg ER tablet Take 50 mg by mouth daily.  aluminum-magnesium hydroxide 200-200 mg/5 mL susp 5 mL, diphenhydrAMINE 12.5 mg/5 mL liqd 5 mL, lidocaine 2 % soln 5 mL 5 mL by Swish and Spit route two (2) days a week. Magic mouth wash   Maalox  Lidocaine 2% viscous   Diphenhydramine oral solution     Pharmacy to mix equal portions of ingredients to a total volume as indicated in the dispense amount.  labetalol (NORMODYNE) 200 mg tablet TAKE 1 TABLET BY MOUTH TWICE A DAY    cranberry fruit concentrate (AZO CRANBERRY PO) Take  by mouth.  calcium carbonate 500 mg calcium (1,250 mg) tab 500 mg, ergocalciferol (vitamin d2) 400 unit tab 200 Units Take 2 Doses by mouth daily.  multivitamin, tx-iron-ca-min (THERA-M W/ IRON) 9 mg iron-400 mcg tab tablet Take 1 Tab by mouth daily.  estradiol (ESTRACE) 0.01 % (0.1 mg/gram) vaginal cream Insert 2 g into vagina daily. No current facility-administered medications for this visit.       Allergies   Allergen Reactions    Adhesive Rash    Sulfa (Sulfonamide Antibiotics) Rash     Immunization History   Administered Date(s) Administered    Influenza High Dose Vaccine PF 10/24/2018    Pneumococcal Conjugate (PCV-13) 07/16/2015    Pneumococcal Vaccine (Unspecified Type) 05/01/2004         Review of Systems   Constitutional: Positive for malaise/fatigue. Negative for chills and fever. HENT: Positive for congestion and sore throat. Negative for ear pain. Respiratory: Positive for cough. Negative for sputum production and shortness of breath. Cardiovascular: Negative for chest pain and palpitations. Gastrointestinal: Negative for nausea and vomiting. Genitourinary: Negative for dysuria and frequency. Musculoskeletal: Negative for myalgias. Skin: Negative for rash. Neurological: Positive for headaches. Negative for dizziness. /84 (BP 1 Location: Left arm, BP Patient Position: Sitting)   Pulse 67   Temp 98.7 °F (37.1 °C) (Oral)   Resp 12   Ht 5' 2\" (1.575 m)   SpO2 99%   BMI 35.44 kg/m²   Physical Exam  Vitals signs and nursing note reviewed. Constitutional:       Appearance: Normal appearance. HENT:      Head: Normocephalic and atraumatic. Right Ear: Tympanic membrane, ear canal and external ear normal.      Left Ear: Tympanic membrane, ear canal and external ear normal.      Nose: Congestion present. Mouth/Throat:      Mouth: Mucous membranes are moist.      Pharynx: Posterior oropharyngeal erythema (mild) present. Cardiovascular:      Rate and Rhythm: Normal rate and regular rhythm. Pulmonary:      Effort: Pulmonary effort is normal.      Breath sounds: Normal breath sounds. Skin:     General: Skin is warm and dry. Neurological:      General: No focal deficit present. Mental Status: She is alert and oriented to person, place, and time. ASSESSMENT and PLAN  Diagnoses and all orders for this visit:    1. Flu-like symptoms  -     AMB POC CAYETANO INFLUENZA A/B TEST -- negative    2.  Viral upper respiratory tract infection -- symptomatic treatment at this time; given printed Rx for Zithromax to fill if symptoms worsen over the next week. -     azithromycin (ZITHROMAX) 250 mg tablet; Take 1 Tab by mouth See Admin Instructions for 5 days.  -     guaiFENesin ER (MUCINEX) 600 mg ER tablet; Take 1 Tab by mouth two (2) times a day. Follow up if signs and symptoms worsen or change. After hours number given.    lab results and schedule of future lab studies reviewed with patient  reviewed diet, exercise and weight control  reviewed medications and side effects in detail

## 2020-03-04 ENCOUNTER — TELEPHONE (OUTPATIENT)
Dept: INTERNAL MEDICINE CLINIC | Age: 85
End: 2020-03-04

## 2020-03-04 NOTE — TELEPHONE ENCOUNTER
Pt was seen on 2/28/20 by NP for cough and cold. However, pt states she still has the cough and would like to know if a cough med can be sent into [facility] today. Please inform pt. Thanks.

## 2020-03-04 NOTE — TELEPHONE ENCOUNTER
If she is having shortness of breath, she needs to return to office to see her PCP Dr Samson Hamilton; she can start the Z-pack in the meantime but Dr Samson Hamilton needs to listen to her lungs

## 2020-03-04 NOTE — TELEPHONE ENCOUNTER
I spoke with patient to advise of NP's message. Pt verbalized understanding and denied an appt with PCP. She doesn't want to come into the office right now. She will monitor her sx and start the zpack today. I asked if I could please schedule an appt for her and then if she is feeling better then she can cancel. Pt declines. I advise if sx get worse she will have to be seen in the ER. Pt verbalized understanding and was thankful.

## 2020-03-04 NOTE — TELEPHONE ENCOUNTER
I spoke with pt and her , pts cough isn't getting better and wants to know if they should start the zpack? They would also like a medication to help with the coughing. She has some SOB with the cough. No Fever or any other sx.

## 2020-03-19 DIAGNOSIS — E78.2 MIXED HYPERLIPIDEMIA: ICD-10-CM

## 2020-03-19 DIAGNOSIS — E03.9 ACQUIRED HYPOTHYROIDISM: ICD-10-CM

## 2020-03-19 DIAGNOSIS — I10 ESSENTIAL HYPERTENSION: ICD-10-CM

## 2020-03-19 PROBLEM — E66.01 SEVERE OBESITY (HCC): Status: ACTIVE | Noted: 2020-03-19

## 2020-04-11 DIAGNOSIS — I10 ESSENTIAL HYPERTENSION: ICD-10-CM

## 2020-04-11 RX ORDER — LABETALOL 200 MG/1
TABLET, FILM COATED ORAL
Qty: 180 TAB | Refills: 0 | Status: SHIPPED | OUTPATIENT
Start: 2020-04-11 | End: 2020-07-06

## 2020-06-10 ENCOUNTER — HOSPITAL ENCOUNTER (OUTPATIENT)
Dept: LAB | Age: 85
Discharge: HOME | End: 2020-06-10

## 2020-06-10 LAB
ALBUMIN SERPL-MCNC: 4.3 G/DL (ref 3.5–5)
ALBUMIN/GLOB SERPL: 1.4 {RATIO} (ref 1.1–2.2)
ALP SERPL-CCNC: 71 U/L (ref 45–117)
ALT SERPL-CCNC: 21 U/L (ref 12–78)
ANION GAP SERPL CALC-SCNC: 6 MMOL/L (ref 5–15)
AST SERPL-CCNC: 18 U/L (ref 15–37)
BILIRUB SERPL-MCNC: 0.8 MG/DL (ref 0.2–1)
BUN SERPL-MCNC: 30 MG/DL (ref 6–20)
BUN/CREAT SERPL: 23 (ref 12–20)
CALCIUM SERPL-MCNC: 10.2 MG/DL (ref 8.5–10.1)
CHLORIDE SERPL-SCNC: 109 MMOL/L (ref 97–108)
CHOLEST SERPL-MCNC: 158 MG/DL
CO2 SERPL-SCNC: 26 MMOL/L (ref 21–32)
CREAT SERPL-MCNC: 1.33 MG/DL (ref 0.55–1.02)
ERYTHROCYTE [DISTWIDTH] IN BLOOD BY AUTOMATED COUNT: 13.7 % (ref 11.5–14.5)
GLOBULIN SER CALC-MCNC: 3.1 G/DL (ref 2–4)
GLUCOSE SERPL-MCNC: 84 MG/DL (ref 65–100)
HCT VFR BLD AUTO: 40.5 % (ref 35–47)
HDLC SERPL-MCNC: 52 MG/DL
HDLC SERPL: 3 {RATIO} (ref 0–5)
HGB BLD-MCNC: 12.6 G/DL (ref 11.5–16)
LDLC SERPL CALC-MCNC: 90.4 MG/DL (ref 0–100)
LIPID PROFILE,FLP: NORMAL
MCH RBC QN AUTO: 31 PG (ref 26–34)
MCHC RBC AUTO-ENTMCNC: 31.1 G/DL (ref 30–36.5)
MCV RBC AUTO: 99.5 FL (ref 80–99)
NRBC # BLD: 0 K/UL (ref 0–0.01)
NRBC BLD-RTO: 0 PER 100 WBC
PLATELET # BLD AUTO: 215 K/UL (ref 150–400)
PMV BLD AUTO: 10.9 FL (ref 8.9–12.9)
POTASSIUM SERPL-SCNC: 4.1 MMOL/L (ref 3.5–5.1)
PROT SERPL-MCNC: 7.4 G/DL (ref 6.4–8.2)
RBC # BLD AUTO: 4.07 M/UL (ref 3.8–5.2)
SODIUM SERPL-SCNC: 141 MMOL/L (ref 136–145)
T4 FREE SERPL-MCNC: 1.3 NG/DL (ref 0.8–1.5)
TRIGL SERPL-MCNC: 78 MG/DL (ref ?–150)
TSH SERPL DL<=0.05 MIU/L-ACNC: 1.37 UIU/ML (ref 0.36–3.74)
VLDLC SERPL CALC-MCNC: 15.6 MG/DL
WBC # BLD AUTO: 5.5 K/UL (ref 3.6–11)

## 2020-07-06 DIAGNOSIS — I10 ESSENTIAL HYPERTENSION: ICD-10-CM

## 2020-07-06 RX ORDER — LABETALOL 200 MG/1
TABLET, FILM COATED ORAL
Qty: 180 TAB | Refills: 0 | Status: SHIPPED | OUTPATIENT
Start: 2020-07-06 | End: 2020-11-12 | Stop reason: SDUPTHER

## 2020-09-21 ENCOUNTER — TELEPHONE (OUTPATIENT)
Dept: INTERNAL MEDICINE CLINIC | Age: 85
End: 2020-09-21

## 2020-09-21 NOTE — TELEPHONE ENCOUNTER
Patient called to request nurse/PCP advise which flu vaccine she should receive. Please call to advise. [phone] No complaints No complaints

## 2020-09-21 NOTE — TELEPHONE ENCOUNTER
Spoke with patient and advised her that we recommend patients 65+ get the high dose flu shot. Pt understood and was thankful for the call.

## 2020-10-20 ENCOUNTER — TELEPHONE (OUTPATIENT)
Dept: INTERNAL MEDICINE CLINIC | Age: 85
End: 2020-10-20

## 2020-10-21 NOTE — TELEPHONE ENCOUNTER
PSR called patient and scheduled appointment with PCP. Latisha Lewis MRN: [id number]     Date: 11/12/2020 Status: Carter    Time: 1:00 PM Length: 15 [id number]   Visit Type: ROUTINE CARE [[id number]] Copay: $0.00    Provider: Katey Baugh MD Department: [facility]    Referral #:   Referral Status:      Referring Provider:   Patient Type:      Notes: 6 month f/u with labs    Disposition Notes:         advised PSR he already scheduled appointment with PCP.

## 2020-10-21 NOTE — TELEPHONE ENCOUNTER
PSR called patient to advise of PCP's note. Patient requested PSR call her back later this afternoon when able to look at calendar.

## 2020-11-12 ENCOUNTER — OFFICE VISIT (OUTPATIENT)
Dept: INTERNAL MEDICINE CLINIC | Age: 85
End: 2020-11-12
Payer: MEDICARE

## 2020-11-12 VITALS
OXYGEN SATURATION: 97 % | HEIGHT: 62 IN | SYSTOLIC BLOOD PRESSURE: 170 MMHG | TEMPERATURE: 98.3 F | HEART RATE: 70 BPM | WEIGHT: 193.6 LBS | DIASTOLIC BLOOD PRESSURE: 85 MMHG | RESPIRATION RATE: 18 BRPM | BODY MASS INDEX: 35.63 KG/M2

## 2020-11-12 DIAGNOSIS — E03.9 ACQUIRED HYPOTHYROIDISM: ICD-10-CM

## 2020-11-12 DIAGNOSIS — I89.0 LYMPHEDEMA: ICD-10-CM

## 2020-11-12 DIAGNOSIS — M54.2 NECK PAIN: ICD-10-CM

## 2020-11-12 DIAGNOSIS — G47.39 OTHER SLEEP APNEA: ICD-10-CM

## 2020-11-12 DIAGNOSIS — E78.2 MIXED HYPERLIPIDEMIA: ICD-10-CM

## 2020-11-12 DIAGNOSIS — Z00.00 MEDICARE ANNUAL WELLNESS VISIT, SUBSEQUENT: Primary | ICD-10-CM

## 2020-11-12 DIAGNOSIS — I10 ESSENTIAL HYPERTENSION: ICD-10-CM

## 2020-11-12 DIAGNOSIS — N39.0 RECURRENT UTI: ICD-10-CM

## 2020-11-12 PROCEDURE — G8417 CALC BMI ABV UP PARAM F/U: HCPCS | Performed by: INTERNAL MEDICINE

## 2020-11-12 PROCEDURE — 1090F PRES/ABSN URINE INCON ASSESS: CPT | Performed by: INTERNAL MEDICINE

## 2020-11-12 PROCEDURE — G8536 NO DOC ELDER MAL SCRN: HCPCS | Performed by: INTERNAL MEDICINE

## 2020-11-12 PROCEDURE — 1101F PT FALLS ASSESS-DOCD LE1/YR: CPT | Performed by: INTERNAL MEDICINE

## 2020-11-12 PROCEDURE — G0463 HOSPITAL OUTPT CLINIC VISIT: HCPCS | Performed by: INTERNAL MEDICINE

## 2020-11-12 PROCEDURE — G8427 DOCREV CUR MEDS BY ELIG CLIN: HCPCS | Performed by: INTERNAL MEDICINE

## 2020-11-12 PROCEDURE — G0439 PPPS, SUBSEQ VISIT: HCPCS | Performed by: INTERNAL MEDICINE

## 2020-11-12 PROCEDURE — G8432 DEP SCR NOT DOC, RNG: HCPCS | Performed by: INTERNAL MEDICINE

## 2020-11-12 PROCEDURE — 99214 OFFICE O/P EST MOD 30 MIN: CPT | Performed by: INTERNAL MEDICINE

## 2020-11-12 RX ORDER — LABETALOL 200 MG/1
200 TABLET, FILM COATED ORAL 2 TIMES DAILY
Qty: 180 TAB | Refills: 1 | Status: SHIPPED | OUTPATIENT
Start: 2020-11-12 | End: 2022-01-13

## 2020-11-12 RX ORDER — LEVOTHYROXINE SODIUM 75 UG/1
75 TABLET ORAL
Qty: 90 TAB | Refills: 1 | Status: SHIPPED | OUTPATIENT
Start: 2020-11-12 | End: 2021-06-23 | Stop reason: SDUPTHER

## 2020-11-12 RX ORDER — VERAPAMIL HYDROCHLORIDE 240 MG/1
240 TABLET, FILM COATED, EXTENDED RELEASE ORAL
Qty: 90 TAB | Refills: 1 | Status: SHIPPED | OUTPATIENT
Start: 2020-11-12 | End: 2021-05-24

## 2020-11-12 NOTE — PROGRESS NOTES
HISTORY OF PRESENT ILLNESS  Abelardo Healy is a 80 y.o. female. HPI  Hypertension ROS: taking medications as instructed, no medication side effects noted, no TIA's, no chest pain on exertion, no dyspnea on exertion, no swelling of ankles. New concerns: BP in office today is 170/85. Pt's daughter reports that pt is not as mobile because she is staying in her home. Hyperlipidemia:  Cardiovascular risk analysis - 80 y.o. female LDL goal is under 130. ROS: taking medications as instructed, no medication side effects noted, no TIA's, no chest pain on exertion, no dyspnea on exertion, no swelling of ankles. Tolerating meds, no myalgias or other side effects noted. New concerns: Pt's last LDL was 90.4 on 6/10/20. hypothyroidism. Lab Results   Component Value Date/Time    TSH 1.37 06/10/2020 08:50 AM     Thyroid ROS: denies fatigue, weight changes, heat/cold intolerance, bowel/skin changes or CVS symptoms. Continues on Synthroid. Lymphedema: Pt reports that she has ongoing swelling in both legs. She notes that the swelling in her L leg is going down slowly. She states that she continues to use the pump twice a day to manage her swelling. Skin: Pt reports that she has Mohs procedure scheduled for January. She notes that she has had multiple appts with dermatology. Recurrent UTI: Followed by urology (Dr. Jose Penn). Stable, pt continues to comply with Myrbetriq. Difficulty Swallowing: Followed by Dr. Mohini Haas. Pt reports that she has had her esophagus stretched recently which went well. She notes that she is currently not having any issues with swallowing. Sleep Apnea: Pt reports that she has a dry cough when she wakes up which she suspects is due to decreasing the water content in her CPAP. Neck stiffness: Pt reports that she neck has been stiff since 02/2020. Pt reports that she spends all day sitting and playing games on a table. Review of Systems   Respiratory: Positive for cough. Musculoskeletal: Positive for neck pain. All other systems reviewed and are negative. Physical Exam  Constitutional:       Appearance: Normal appearance. HENT:      Right Ear: Hearing, tympanic membrane and external ear normal.      Left Ear: Hearing, tympanic membrane and external ear normal.      Mouth/Throat:      Mouth: Mucous membranes are moist.      Pharynx: Oropharynx is clear. Cardiovascular:      Rate and Rhythm: Normal rate and regular rhythm. Pulses: Normal pulses. Heart sounds: Normal heart sounds. Pulmonary:      Effort: Pulmonary effort is normal.      Breath sounds: Normal breath sounds and air entry. Musculoskeletal:      Comments: Pt ambulates with walker. Pt is not able to stand and get on examination table on her own. Skin:     General: Skin is warm and dry. Neurological:      General: No focal deficit present. Mental Status: She is alert and oriented to person, place, and time. Psychiatric:         Mood and Affect: Mood normal.         Behavior: Behavior normal.         ASSESSMENT and PLAN  Diagnoses and all orders for this visit:    1. Medicare annual wellness visit, subsequent    2. Essential hypertension  BP is at goal. I do not recommend any change in medications. 3. Acquired hypothyroidism  Thyroid stable. I do not recommend a change in medications. 4. Mixed hyperlipidemia  Stable, patient is tolerating medications, no myalgias. I do not recommend any change in medications. 5. Lymphedema  Stable and well-managed. No change in medications. 6. Recurrent UTI  Stable and well-managed. No change in medications. 7. Other sleep apnea  Stable and well-managed. No change in treatment. Recommended pt increase water content in CPAP to see if her dry cough improves. Discussed with pt that I am concerned about deconditioning since she is not as mobile as she was before. Recommended PT and pt deferred.  Had conversation with pt that I want her to work on exercising and remaining mobile at home since I do not want her risk of fall to increase. Informed pt that I want her to walk with the supervision of her  or daughter. Will continue to monitor for improvements or changes. Lab results and schedule of future lab studies reviewed with patient. Reviewed diet, exercise and weight control. Written by Maryann Morales, as dictated by Radha Juinor MD.     Current diagnosis and concerns discussed with pt at length. Understands risks and benefits or current treatment plan and medications and accepts the treatment and medication with any possible risks. Pt asks appropriate questions which were answered. Pt instructed to call with any concerns or problems.

## 2020-11-12 NOTE — PATIENT INSTRUCTIONS
Medicare Wellness Visit, Female The best way to live healthy is to have a lifestyle where you eat a well-balanced diet, exercise regularly, limit alcohol use, and quit all forms of tobacco/nicotine, if applicable. Regular preventive services are another way to keep healthy. Preventive services (vaccines, screening tests, monitoring & exams) can help personalize your care plan, which helps you manage your own care. Screening tests can find health problems at the earliest stages, when they are easiest to treat. [facility] follows the current, evidence-based guidelines published by the [facility] ([facility]) when recommending preventive services for our patients. Because we follow these guidelines, sometimes recommendations change over time as research supports it. (For example, mammograms used to be recommended annually. Even though Medicare will still pay for an annual mammogram, the newer guidelines recommend a mammogram every two years for women of average risk). Of course, you and your doctor may decide to screen more often for some diseases, based on your risk and your co-morbidities (chronic disease you are already diagnosed with). Preventive services for you include: - Medicare offers their members a free annual wellness visit, which is time for you and your primary care provider to discuss and plan for your preventive service needs. Take advantage of this benefit every year! 
-All adults over the age of 72 should receive the recommended pneumonia vaccines. Current USPSTF guidelines recommend a series of two vaccines for the best pneumonia protection.  
-All adults should have a flu vaccine yearly and a tetanus vaccine every 10 years.  
-All adults age 48 and older should receive the shingles vaccines (series of two vaccines). -All adults age 38-68 who are overweight should have a diabetes screening test once every three years. -All adults born between [birth year] and 1965 should be screened once for Hepatitis C. 
-Other screening tests and preventive services for persons with diabetes include: an eye exam to screen for diabetic retinopathy, a kidney function test, a foot exam, and stricter control over your cholesterol.  
-Cardiovascular screening for adults with routine risk involves an electrocardiogram (ECG) at intervals determined by your doctor.  
-Colorectal cancer screenings should be done for adults age 54-65 with no increased risk factors for colorectal cancer. There are a number of acceptable methods of screening for this type of cancer. Each test has its own benefits and drawbacks. Discuss with your doctor what is most appropriate for you during your annual wellness visit. The different tests include: colonoscopy (considered the best screening method), a fecal occult blood test, a fecal DNA test, and sigmoidoscopy. 
 
-A bone mass density test is recommended when a woman turns 65 to screen for osteoporosis. This test is only recommended one time, as a screening. Some providers will use this same test as a disease monitoring tool if you already have osteoporosis. -Breast cancer screenings are recommended every other year for women of normal risk, age 54-69. 
-Cervical cancer screenings for women over age 72 are only recommended with certain risk factors. Here is a list of your current Health Maintenance items (your personalized list of preventive services) with a due date: 
Health Maintenance Due Topic Date Due  
 DTaP/Tdap/Td  (1 - Tdap) 08/01/1955  Shingles Vaccine (1 of 2) 08/01/1984  Glaucoma Screening   08/01/1999  Bone Mineral Density   08/01/1999 Mauro Honeycutt Annual Well Visit  03/04/2020  Yearly Flu Vaccine (1) 09/01/2020

## 2020-11-12 NOTE — PROGRESS NOTES
This is the Subsequent Medicare Annual Wellness Exam, performed 12 months or more after the Initial AWV or the last Subsequent AWV    I have reviewed the patient's medical history in detail and updated the computerized patient record. Depression Risk Factor Screening:     3 most recent PHQ Screens 9/9/2019   Little interest or pleasure in doing things Not at all   Feeling down, depressed, irritable, or hopeless Not at all   Total Score PHQ 2 0       Alcohol Risk Screen   Do you average more than 1 drink per night or more than 7 drinks a week:  No    On any one occasion in the past three months have you have had more than 3 drinks containing alcohol:  No        Functional Ability and Level of Safety:   Hearing: The patient wears hearing aids. Activities of Daily Living: The home contains: handrails and grab bars  Patient needs help with:  transportation, preparing meals, managing medications and managing money     Ambulation: with difficulty, uses a walker     Fall Risk:  Fall Risk Assessment, last 12 mths 11/12/2020   Able to walk? Yes   Fall in past 12 months? No     Abuse Screen:  Patient is not abused       Cognitive Screening   Has your family/caregiver stated any concerns about your memory: no     Cognitive Screening: Normal - MMSE (Mini Mental Status Exam)    Assessment/Plan   Education and counseling provided:  Are appropriate based on today's review and evaluation    Diagnoses and all orders for this visit:    1. Essential hypertension    2. Acquired hypothyroidism    3. Mixed hyperlipidemia    4. Lymphedema    5.  Recurrent UTI        Health Maintenance Due     Health Maintenance Due   Topic Date Due    DTaP/Tdap/Td series (1 - Tdap) 08/01/1955    Shingrix Vaccine Age 50> (1 of 2) 08/01/1984    GLAUCOMA SCREENING Q2Y  08/01/1999    Bone Densitometry (Dexa) Screening  08/01/1999    Medicare Yearly Exam  03/04/2020    Flu Vaccine (1) 09/01/2020       Patient Care Team   Patient Care Team:  Farheen Biggs MD as PCP - General (Internal Medicine)  Farheen Biggs MD as PCP - Pravin Soria Provider    History     Patient Active Problem List   Diagnosis Code    Severe obesity ([facility] [address] E66.01     Past Medical History:   Diagnosis Date    Cataract     bilateral eyes    Frequent urinary tract infections     H/O: hysterectomy     Hypertension     Melanoma ([facility] [address]     leg    Thyroid disease       Past Surgical History:   Procedure Laterality Date    HX HERNIA REPAIR      x2    HX ORTHOPAEDIC      bilateral TKR    HX ORTHOPAEDIC      left arm fracture repair    IR CHOLECYSTOSTOMY PERCUTANEOUS       Current Outpatient Medications   Medication Sig Dispense Refill    verapamil ER (CALAN-SR) 240 mg CR tablet TAKE 1 TABLET BY MOUTH EVERY DAY 30 Tab 0    levothyroxine (SYNTHROID) 75 mcg tablet TAKE 1 TABLET BY MOUTH EVERY DAY 30 Tab 0    labetaloL (NORMODYNE) 200 mg tablet TAKE 1 TABLET BY MOUTH TWICE A  Tab 0    B.infantis-B.ani-B.long-B.bifi (PROBIOTIC 4X) 10-15 mg TbEC Take  by mouth daily.  famotidine (PEPCID) 20 mg tablet Take 20 mg by mouth two (2) times a day.  mirabegron ER (MYRBETRIQ) 25 mg ER tablet Take 50 mg by mouth daily.  aluminum-magnesium hydroxide 200-200 mg/5 mL susp 5 mL, diphenhydrAMINE 12.5 mg/5 mL liqd 5 mL, lidocaine 2 % soln 5 mL 5 mL by Swish and Spit route two (2) days a week. Magic mouth wash   Maalox  Lidocaine 2% viscous   Diphenhydramine oral solution     Pharmacy to mix equal portions of ingredients to a total volume as indicated in the dispense amount.  cranberry fruit concentrate (AZO CRANBERRY PO) Take  by mouth.  calcium carbonate 500 mg calcium (1,250 mg) tab 500 mg, ergocalciferol (vitamin d2) 400 unit tab 200 Units Take 2 Doses by mouth daily.  multivitamin, tx-iron-ca-min (THERA-M W/ IRON) 9 mg iron-400 mcg tab tablet Take 1 Tab by mouth daily.       estradiol (ESTRACE) 0.01 % (0.1 mg/gram) vaginal cream Insert 2 g into vagina daily.        Allergies   Allergen Reactions    Adhesive Rash    Sulfa (Sulfonamide Antibiotics) Rash       Family History   Problem Relation Age of Onset    Cancer Father     Cancer Brother      Social History     Tobacco Use    Smoking status: Never Smoker    Smokeless tobacco: Never Used   Substance Use Topics    Alcohol use: No     Frequency: Never

## 2021-01-12 ENCOUNTER — TELEPHONE (OUTPATIENT)
Dept: INTERNAL MEDICINE CLINIC | Age: 86
End: 2021-01-12

## 2021-01-12 NOTE — TELEPHONE ENCOUNTER
----- Message from Ludmila Cash sent at 1/12/2021  2:54 PM EST -----  Regarding: Dr. Jesus Hunt Message/Vendor Calls    Caller's first and last name: n/a      Reason for call: requesting to know what medicines to take to not have allergic reactions to the covid vaccine      Callback required yes/no and why: yes      Best contact number(s): [phone]      Details to clarify the request: n/a      Ludmila Cash

## 2021-01-13 ENCOUNTER — TELEPHONE (OUTPATIENT)
Dept: INTERNAL MEDICINE CLINIC | Age: 86
End: 2021-01-13

## 2021-01-13 NOTE — TELEPHONE ENCOUNTER
----- Message from Luisa Sams sent at 1/12/2021  2:54 PM EST -----  Regarding: Dr. Shahzad Kramer Message/Vendor Calls    Caller's first and last name: n/a      Reason for call: requesting to know what medicines to take to not have allergic reactions to the covid vaccine      Callback required yes/no and why: yes      Best contact number(s): [phone]      Details to clarify the request: n/a      Luisa Sams

## 2021-01-13 NOTE — TELEPHONE ENCOUNTER
Spoke with patient and she was concerned about the possibility of having arm pain and allergic reaction symptoms to the COVID-19 vaccine. Advised patient she can take tylenol or motrin to help with any arm pain she may experience. Pt inquires if she should have an epipen in case of reaction but also stated that her facility is requiring that they wait 30 minutes before leaving the area. Advised patient that she does not need an epipen and if she should have a severe reaction the healthcare providers administering the vaccine will be able to treat her. Pt understood and was thankful for the call.

## 2021-04-26 ENCOUNTER — OFFICE VISIT (OUTPATIENT)
Dept: INTERNAL MEDICINE CLINIC | Age: 86
End: 2021-04-26
Payer: MEDICARE

## 2021-04-26 VITALS
BODY MASS INDEX: 35.99 KG/M2 | OXYGEN SATURATION: 97 % | SYSTOLIC BLOOD PRESSURE: 159 MMHG | HEART RATE: 59 BPM | DIASTOLIC BLOOD PRESSURE: 77 MMHG | TEMPERATURE: 98 F | WEIGHT: 195.6 LBS | HEIGHT: 62 IN | RESPIRATION RATE: 16 BRPM

## 2021-04-26 DIAGNOSIS — E78.2 MIXED HYPERLIPIDEMIA: ICD-10-CM

## 2021-04-26 DIAGNOSIS — E03.9 ACQUIRED HYPOTHYROIDISM: ICD-10-CM

## 2021-04-26 DIAGNOSIS — E66.01 SEVERE OBESITY (HCC): ICD-10-CM

## 2021-04-26 DIAGNOSIS — N39.0 RECURRENT UTI: ICD-10-CM

## 2021-04-26 DIAGNOSIS — I10 ESSENTIAL HYPERTENSION: Primary | ICD-10-CM

## 2021-04-26 PROCEDURE — G8427 DOCREV CUR MEDS BY ELIG CLIN: HCPCS | Performed by: INTERNAL MEDICINE

## 2021-04-26 PROCEDURE — G8419 CALC BMI OUT NRM PARAM NOF/U: HCPCS | Performed by: INTERNAL MEDICINE

## 2021-04-26 PROCEDURE — 99214 OFFICE O/P EST MOD 30 MIN: CPT | Performed by: INTERNAL MEDICINE

## 2021-04-26 PROCEDURE — G8432 DEP SCR NOT DOC, RNG: HCPCS | Performed by: INTERNAL MEDICINE

## 2021-04-26 PROCEDURE — G0463 HOSPITAL OUTPT CLINIC VISIT: HCPCS | Performed by: INTERNAL MEDICINE

## 2021-04-26 PROCEDURE — 1101F PT FALLS ASSESS-DOCD LE1/YR: CPT | Performed by: INTERNAL MEDICINE

## 2021-04-26 PROCEDURE — G8536 NO DOC ELDER MAL SCRN: HCPCS | Performed by: INTERNAL MEDICINE

## 2021-04-26 PROCEDURE — 1090F PRES/ABSN URINE INCON ASSESS: CPT | Performed by: INTERNAL MEDICINE

## 2021-04-26 NOTE — PROGRESS NOTES
Charissa Friend (: 1934) is a 80 y.o. female, established patient, here for evaluation of the following chief complaint(s):  Follow-up       ASSESSMENT/PLAN:  Below is the assessment and plan developed based on review of pertinent history, physical exam, labs, studies, and medications. 1. Essential hypertension  BP is borderline. Informed pt that I am comfortable with her systolic values getting up to 150 as long as her diastolic is in the 26'E. Continue on labetalol. Had conversation with pt that I need her to make more of an effort to be active and walk around. Suggested PT - pt is reluctant and states that she will think about it. Will continue to monitor for improvements or changes. -     CBC W/O DIFF; Future    2. Severe obesity ([address]  Not at goal. Had discussion about the importance of exercise and healthy diet on pt's health. Will continue to monitor for improvements or changes. 3. Mixed hyperlipidemia  Presumed stable, will recheck labs today. Will continue to monitor for improvements or changes.  -     LIPID PANEL; Future  -     METABOLIC PANEL, COMPREHENSIVE; Future    4. Recurrent UTI  Stable and well-managed. Continue with ongoing regimen of Myrbetriq. Followed by Dr. Laird Peabody. Will continue to monitor for improvements or changes. 5. Acquired hypothyroidism  Thyroid presumed stable. Rechecking labs to confirm. Pt tolerating medication. I do not recommend a change in Synthroid. -     TSH 3RD GENERATION; Future  -     T4, FREE; Future    Informed pt that she can follow with Dr. Griselda Daley or Dr. Maldonado if she needs Mohs procedure in the future. Her right leg is swollen after recent procedure and she does have a wrap and it is being monitored    SUBJECTIVE/OBJECTIVE:  HPI  Hypertension ROS: taking medications as instructed, no medication side effects noted, no TIA's, no chest pain on exertion, no dyspnea on exertion, no swelling of ankles. New concerns: BP in office today is 159/77.  Pt reports that she is not monitoring her BP at home. Pt reports that she has been more sedentary. Hyperlipidemia:  Cardiovascular risk analysis - 80 y.o. female LDL goal is under 130. ROS: taking medications as instructed, no medication side effects noted, no TIA's, no chest pain on exertion, no dyspnea on exertion, no swelling of ankles. Tolerating meds, no myalgias or other side effects noted. New concerns:  Lab Results   Component Value Date/Time    LDL, calculated 90.4 06/10/2020 08:50 AM       hypothyroidism. Lab Results   Component Value Date/Time    TSH 1.37 06/10/2020 08:50 AM     Thyroid ROS: denies fatigue, weight changes, heat/cold intolerance, bowel/skin changes or CVS symptoms. Skin cancer: Pt reports that she followed with Dr. Migel Berrios to have Mohs procedure. She notes that she wants referral to another specialist if she needs Mohs procedure in the future. Urinary: Followed by Dr. Laird Peabody. Stable, pt continues to comply with Myrbetriq. She notes that she has not been getting recurring UTIs. Review of Systems   All other systems reviewed and are negative. Physical Exam  Constitutional:       Appearance: Normal appearance. HENT:      Right Ear: Tympanic membrane and external ear normal.      Left Ear: Tympanic membrane and external ear normal.      Mouth/Throat:      Mouth: Mucous membranes are moist.      Pharynx: Oropharynx is clear. Cardiovascular:      Rate and Rhythm: Normal rate and regular rhythm. Pulses: Normal pulses. Heart sounds: Normal heart sounds. Pulmonary:      Effort: Pulmonary effort is normal.      Breath sounds: Normal breath sounds. Musculoskeletal:      Comments: Pt ambulates with walker   Skin:     General: Skin is warm and dry. Neurological:      General: No focal deficit present. Mental Status: She is alert and oriented to person, place, and time.    Psychiatric:         Mood and Affect: Mood normal.         Behavior: Behavior normal. Lab results and schedule of future lab studies reviewed with patient. Reviewed diet, exercise and weight control. Written by Jorge Gallegos, as dictated by Sai Miller MD.     Current diagnosis and concerns discussed with pt at length. Understands risks and benefits or current treatment plan and medications and accepts the treatment and medication with any possible risks. Pt asks appropriate questions which were answered. Pt instructed to call with any concerns or problems.

## 2021-04-27 LAB
ALBUMIN SERPL-MCNC: 4 G/DL (ref 3.5–5)
ALBUMIN/GLOB SERPL: 1.3 {RATIO} (ref 1.1–2.2)
ALP SERPL-CCNC: 82 U/L (ref 45–117)
ALT SERPL-CCNC: 18 U/L (ref 12–78)
ANION GAP SERPL CALC-SCNC: 7 MMOL/L (ref 5–15)
AST SERPL-CCNC: 17 U/L (ref 15–37)
BILIRUB SERPL-MCNC: 0.6 MG/DL (ref 0.2–1)
BUN SERPL-MCNC: 25 MG/DL (ref 6–20)
BUN/CREAT SERPL: 23 (ref 12–20)
CALCIUM SERPL-MCNC: 9.4 MG/DL (ref 8.5–10.1)
CHLORIDE SERPL-SCNC: 108 MMOL/L (ref 97–108)
CHOLEST SERPL-MCNC: 141 MG/DL
CO2 SERPL-SCNC: 27 MMOL/L (ref 21–32)
CREAT SERPL-MCNC: 1.09 MG/DL (ref 0.55–1.02)
ERYTHROCYTE [DISTWIDTH] IN BLOOD BY AUTOMATED COUNT: 13.9 % (ref 11.5–14.5)
GLOBULIN SER CALC-MCNC: 3 G/DL (ref 2–4)
GLUCOSE SERPL-MCNC: 93 MG/DL (ref 65–100)
HCT VFR BLD AUTO: 35.4 % (ref 35–47)
HDLC SERPL-MCNC: 51 MG/DL
HDLC SERPL: 2.8 {RATIO} (ref 0–5)
HGB BLD-MCNC: 11.5 G/DL (ref 11.5–16)
LDLC SERPL CALC-MCNC: 76.2 MG/DL (ref 0–100)
LIPID PROFILE,FLP: NORMAL
MCH RBC QN AUTO: 31.6 PG (ref 26–34)
MCHC RBC AUTO-ENTMCNC: 32.5 G/DL (ref 30–36.5)
MCV RBC AUTO: 97.3 FL (ref 80–99)
NRBC # BLD: 0 K/UL (ref 0–0.01)
NRBC BLD-RTO: 0 PER 100 WBC
PLATELET # BLD AUTO: 213 K/UL (ref 150–400)
PMV BLD AUTO: 10.6 FL (ref 8.9–12.9)
POTASSIUM SERPL-SCNC: 3.8 MMOL/L (ref 3.5–5.1)
PROT SERPL-MCNC: 7 G/DL (ref 6.4–8.2)
RBC # BLD AUTO: 3.64 M/UL (ref 3.8–5.2)
SODIUM SERPL-SCNC: 142 MMOL/L (ref 136–145)
T4 FREE SERPL-MCNC: 1.2 NG/DL (ref 0.8–1.5)
TRIGL SERPL-MCNC: 69 MG/DL (ref ?–150)
TSH SERPL DL<=0.05 MIU/L-ACNC: 2.74 UIU/ML (ref 0.36–3.74)
VLDLC SERPL CALC-MCNC: 13.8 MG/DL
WBC # BLD AUTO: 5.6 K/UL (ref 3.6–11)

## 2021-05-24 DIAGNOSIS — I10 ESSENTIAL HYPERTENSION: ICD-10-CM

## 2021-05-24 RX ORDER — VERAPAMIL HYDROCHLORIDE 240 MG/1
TABLET, FILM COATED, EXTENDED RELEASE ORAL
Qty: 90 TABLET | Refills: 0 | Status: SHIPPED | OUTPATIENT
Start: 2021-05-24 | End: 2021-08-12

## 2021-06-23 DIAGNOSIS — E03.9 ACQUIRED HYPOTHYROIDISM: ICD-10-CM

## 2021-06-23 RX ORDER — LEVOTHYROXINE SODIUM 75 UG/1
75 TABLET ORAL
Qty: 90 TABLET | Refills: 1 | Status: SHIPPED | OUTPATIENT
Start: 2021-06-23 | End: 2021-12-20

## 2021-06-23 NOTE — TELEPHONE ENCOUNTER
[facility]  Medication Refill     Caller (if not patient): pt       Relationship of caller (if not patient): pt       Best contact number(s): [phone]       Name of medication and dosage if known: levothyroxine       Is patient out of this medication (yes/no): yes (for 3 days now)       Pharmacy name: [facility] listed in chart? (yes/no): yes   Pharmacy phone number: [phone]       Date of last visit: 4/26/21       Details to clarify the request: Pharmacy has sent request without response.        Butch Cornell

## 2021-08-12 DIAGNOSIS — I10 ESSENTIAL HYPERTENSION: ICD-10-CM

## 2021-08-12 RX ORDER — VERAPAMIL HYDROCHLORIDE 240 MG/1
TABLET, FILM COATED, EXTENDED RELEASE ORAL
Qty: 90 TABLET | Refills: 0 | Status: SHIPPED | OUTPATIENT
Start: 2021-08-12 | End: 2021-11-15

## 2021-11-03 NOTE — PROGRESS NOTES
Kirti Dacosta (: 1934) is a 80 y.o. female, established patient, here for evaluation of the following chief complaint(s):  Follow-up       ASSESSMENT/PLAN:  Below is the assessment and plan developed based on review of pertinent history, physical exam, labs, studies, and medications. 1. Medicare annual wellness visit, subsequent  I encouraged to move as tolerated and rebuild her stamina. 2. Essential hypertension  -     METABOLIC PANEL, COMPREHENSIVE; Future  -     LIPID PANEL; Future  BP is at goal. I do not recommend any change in medications. Continue with ongoing regimen of Verapamil and labetalol. 3. Acquired hypothyroidism  -     TSH 3RD GENERATION; Future  -     T4, FREE; Future  Thyroid presumed stable. Rechecking labs to confirm. Pt tolerating medication. I do not recommend a change in treatment plan. Continue with ongoing regimen of Synthroid. 4. Recurrent UTI  Not at goal. Will continue to monitor for improvements or changes after she sees urology. Continue with Myrbetriq. 5. Abrasion  -     cephALEXin (KEFLEX) 500 mg capsule; Take 1 Capsule by mouth two (2) times a day., Normal, Disp-14 Capsule, R-0  I believe that the area is slightly infected. I will rx abx and recommended that she apply neosporin or bacitracin. Will monitor for improvements or changes. 6. Lymphedema  Stable, continue using pump. 7. Neck pain  I suspect that the pt's pain and lack of ROM are related to arthritis. I recommended PT. No follow-ups on file. 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 She states that she hit her leg above that incision, which is draining and painful. Trouble swallowing: Pt reports that she is swallowing well. Neck pain: Pt states that her neck is sore and her ROM is limited. She experiences difficulty turning her neck from side to side, but not up and down.      Leg pain: Pt notes that she feels weak despite her pervious leg surgery. She walks with a walker all of the time. Pt admits that she does not get out much due to the pandemic, which has decreased her stamina. Urinary urgency: Pt expresses dissatisfaction with her urinary regimen, but has a f/u with urology. She notes that she does not wake up as much in the middle of the night, but does \"leak\" overnight. Lymphedema: Pt states that she is not wearing stockings, but uses the pump to drain fluid. Hypertension ROS: taking medications as instructed, no medication side effects noted, no TIA's, no chest pain on exertion, no dyspnea on exertion, no swelling of ankles. BP in office today is 140/68. Abdominal pain: Pt reports pain in her LLQ for 2 days a couple of weeks ago. She believes that this was due to constipation as it resolved when she had milk of magnesia. Other: Pt plans to receive her booster vaccine tomorrow. She believes that her balance is stable. Review of Systems   Genitourinary: Positive for urgency. Musculoskeletal: Positive for arthralgias, myalgias, neck pain and neck stiffness. All other systems reviewed and are negative. Physical Exam  Constitutional:       Appearance: Normal appearance. HENT:      Right Ear: Tympanic membrane and external ear normal.      Left Ear: Tympanic membrane and external ear normal.      Mouth/Throat:      Mouth: Mucous membranes are moist.      Pharynx: Oropharynx is clear. Cardiovascular:      Rate and Rhythm: Normal rate and regular rhythm. Pulses: Normal pulses. Heart sounds: Normal heart sounds. Pulmonary:      Effort: Pulmonary effort is normal.      Breath sounds: Normal breath sounds. Musculoskeletal:         General: Normal range of motion. Skin:     General: Skin is warm and dry. Comments: Abrasion on right leg 1x1 cm- nor ulcerated- slight erythema and drainage   Neurological:      General: No focal deficit present.       Mental Status: She is alert and oriented to person, place, and time. Psychiatric:         Mood and Affect: Mood normal.         Behavior: Behavior normal.     On this date 11/04/2021 I have spent 30 minutes reviewing previous notes, test results and face to face with the patient discussing the diagnosis and importance of compliance with the treatment plan as well as documenting on the day of the visit. An electronic signature was used to authenticate this note. Written by Liana Tejeda as dictated by Dr. Gagan Craig.    -- Liana Tejeda

## 2021-11-04 ENCOUNTER — OFFICE VISIT (OUTPATIENT)
Dept: INTERNAL MEDICINE CLINIC | Age: 86
End: 2021-11-04
Payer: MEDICARE

## 2021-11-04 VITALS
SYSTOLIC BLOOD PRESSURE: 140 MMHG | BODY MASS INDEX: 34.67 KG/M2 | RESPIRATION RATE: 16 BRPM | HEART RATE: 64 BPM | OXYGEN SATURATION: 94 % | WEIGHT: 188.4 LBS | DIASTOLIC BLOOD PRESSURE: 68 MMHG | HEIGHT: 62 IN | TEMPERATURE: 98.4 F

## 2021-11-04 DIAGNOSIS — Z00.00 MEDICARE ANNUAL WELLNESS VISIT, SUBSEQUENT: Primary | ICD-10-CM

## 2021-11-04 DIAGNOSIS — T14.8XXA ABRASION: ICD-10-CM

## 2021-11-04 DIAGNOSIS — I10 ESSENTIAL HYPERTENSION: ICD-10-CM

## 2021-11-04 DIAGNOSIS — I89.0 LYMPHEDEMA: ICD-10-CM

## 2021-11-04 DIAGNOSIS — M54.2 NECK PAIN: ICD-10-CM

## 2021-11-04 DIAGNOSIS — E03.9 ACQUIRED HYPOTHYROIDISM: ICD-10-CM

## 2021-11-04 DIAGNOSIS — N39.0 RECURRENT UTI: ICD-10-CM

## 2021-11-04 LAB
ALBUMIN SERPL-MCNC: 3.6 G/DL (ref 3.5–5)
ALBUMIN/GLOB SERPL: 1.1 {RATIO} (ref 1.1–2.2)
ALP SERPL-CCNC: 75 U/L (ref 45–117)
ALT SERPL-CCNC: 17 U/L (ref 12–78)
ANION GAP SERPL CALC-SCNC: 6 MMOL/L (ref 5–15)
AST SERPL-CCNC: 13 U/L (ref 15–37)
BILIRUB SERPL-MCNC: 0.6 MG/DL (ref 0.2–1)
BUN SERPL-MCNC: 28 MG/DL (ref 6–20)
BUN/CREAT SERPL: 22 (ref 12–20)
CALCIUM SERPL-MCNC: 9.6 MG/DL (ref 8.5–10.1)
CHLORIDE SERPL-SCNC: 108 MMOL/L (ref 97–108)
CHOLEST SERPL-MCNC: 133 MG/DL
CO2 SERPL-SCNC: 27 MMOL/L (ref 21–32)
CREAT SERPL-MCNC: 1.3 MG/DL (ref 0.55–1.02)
GLOBULIN SER CALC-MCNC: 3.2 G/DL (ref 2–4)
GLUCOSE SERPL-MCNC: 87 MG/DL (ref 65–100)
HDLC SERPL-MCNC: 44 MG/DL
HDLC SERPL: 3 {RATIO} (ref 0–5)
LDLC SERPL CALC-MCNC: 71.2 MG/DL (ref 0–100)
POTASSIUM SERPL-SCNC: 4.2 MMOL/L (ref 3.5–5.1)
PROT SERPL-MCNC: 6.8 G/DL (ref 6.4–8.2)
SODIUM SERPL-SCNC: 141 MMOL/L (ref 136–145)
T4 FREE SERPL-MCNC: 1.3 NG/DL (ref 0.8–1.5)
TRIGL SERPL-MCNC: 89 MG/DL (ref ?–150)
TSH SERPL DL<=0.05 MIU/L-ACNC: 1.14 UIU/ML (ref 0.36–3.74)
VLDLC SERPL CALC-MCNC: 17.8 MG/DL

## 2021-11-04 PROCEDURE — 1101F PT FALLS ASSESS-DOCD LE1/YR: CPT | Performed by: INTERNAL MEDICINE

## 2021-11-04 PROCEDURE — G8536 NO DOC ELDER MAL SCRN: HCPCS | Performed by: INTERNAL MEDICINE

## 2021-11-04 PROCEDURE — 99214 OFFICE O/P EST MOD 30 MIN: CPT | Performed by: INTERNAL MEDICINE

## 2021-11-04 PROCEDURE — G8510 SCR DEP NEG, NO PLAN REQD: HCPCS | Performed by: INTERNAL MEDICINE

## 2021-11-04 PROCEDURE — G8417 CALC BMI ABV UP PARAM F/U: HCPCS | Performed by: INTERNAL MEDICINE

## 2021-11-04 PROCEDURE — 1090F PRES/ABSN URINE INCON ASSESS: CPT | Performed by: INTERNAL MEDICINE

## 2021-11-04 PROCEDURE — G8427 DOCREV CUR MEDS BY ELIG CLIN: HCPCS | Performed by: INTERNAL MEDICINE

## 2021-11-04 PROCEDURE — G0463 HOSPITAL OUTPT CLINIC VISIT: HCPCS | Performed by: INTERNAL MEDICINE

## 2021-11-04 PROCEDURE — G0439 PPPS, SUBSEQ VISIT: HCPCS | Performed by: INTERNAL MEDICINE

## 2021-11-04 RX ORDER — CEPHALEXIN 500 MG/1
500 CAPSULE ORAL 2 TIMES DAILY
Qty: 14 CAPSULE | Refills: 0 | Status: SHIPPED | OUTPATIENT
Start: 2021-11-04 | End: 2022-05-05 | Stop reason: ALTCHOICE

## 2021-11-04 NOTE — PROGRESS NOTES
This is the Subsequent Medicare Annual Wellness Exam, performed 12 months or more after the Initial AWV or the last Subsequent AWV    I have reviewed the patient's medical history in detail and updated the computerized patient record. Assessment/Plan   Education and counseling provided:  Are appropriate based on today's review and evaluation    1. Medicare annual wellness visit, subsequent  2. Essential hypertension  3. Acquired hypothyroidism  4. Recurrent UTI  5. Abrasion  6. Lymphedema  7. Neck pain       Depression Risk Factor Screening     3 most recent PHQ Screens 11/4/2021   Little interest or pleasure in doing things Not at all   Feeling down, depressed, irritable, or hopeless Not at all   Total Score PHQ 2 0       Alcohol Risk Screen    Do you average more than 1 drink per night or more than 7 drinks a week:  No    On any one occasion in the past three months have you have had more than 3 drinks containing alcohol:  No        Functional Ability and Level of Safety    Hearing: The patient wears hearing aids. Activities of Daily Living: The home contains: handrails and grab bars  Patient needs help with:  transportation, laundry and housework      Ambulation: with difficulty, can't walk further than 1 blocks     Fall Risk:  Fall Risk Assessment, last 12 mths 11/12/2020   Able to walk? Yes   Fall in past 12 months?  No      Abuse Screen:  stays home a lot       Cognitive Screening    Has your family/caregiver stated any concerns about your memory: no     Cognitive Screening: Normal - MMSE (Mini Mental Status Exam)    Health Maintenance Due     Health Maintenance Due   Topic Date Due    DTaP/Tdap/Td series (1 - Tdap) Never done    Bone Densitometry (Dexa) Screening  Never done    Pneumococcal 65+ years (2 of 2 - PPSV23) 07/16/2016    COVID-19 Vaccine (3 - Pfizer booster) 08/05/2021    Medicare Yearly Exam  11/13/2021       Patient Care Team   Patient Care Team:  Prem Han MD as PCP - General (Internal Medicine)  Kavitha Byrd MD as PCP - [facility]    History     Patient Active Problem List   Diagnosis Code    Severe obesity ([facility] [address] E66.01     Past Medical History:   Diagnosis Date    Cataract     bilateral eyes    Frequent urinary tract infections     H/O: hysterectomy     Hypertension     Melanoma ([facility] [address]     leg    Thyroid disease       Past Surgical History:   Procedure Laterality Date    HX HERNIA REPAIR      x2    HX ORTHOPAEDIC      bilateral TKR    HX ORTHOPAEDIC      left arm fracture repair    HX OTHER SURGICAL      MOES surgery    IR CHOLECYSTOSTOMY PERCUTANEOUS       Current Outpatient Medications   Medication Sig Dispense Refill    verapamil ER (CALAN-SR) 240 mg CR tablet TAKE 1 TABLET BY MOUTH ONCE DAILY AT  NIGHT 90 Tablet 0    levothyroxine (SYNTHROID) 75 mcg tablet Take 1 Tablet by mouth Daily (before breakfast). 90 Tablet 1    labetaloL (NORMODYNE) 200 mg tablet Take 1 Tab by mouth two (2) times a day. 180 Tab 1    B.infantis-B.ani-B.long-B.bifi (PROBIOTIC 4X) 10-15 mg TbEC Take  by mouth daily.  famotidine (PEPCID) 20 mg tablet Take 20 mg by mouth two (2) times a day.  mirabegron ER (MYRBETRIQ) 25 mg ER tablet Take 50 mg by mouth daily.  aluminum-magnesium hydroxide 200-200 mg/5 mL susp 5 mL, diphenhydrAMINE 12.5 mg/5 mL liqd 5 mL, lidocaine 2 % soln 5 mL 5 mL by Swish and Spit route two (2) days a week. Magic mouth wash   Maalox  Lidocaine 2% viscous   Diphenhydramine oral solution     Pharmacy to mix equal portions of ingredients to a total volume as indicated in the dispense amount.  cranberry fruit concentrate (AZO CRANBERRY PO) Take  by mouth.  calcium carbonate 500 mg calcium (1,250 mg) tab 500 mg, ergocalciferol (vitamin d2) 400 unit tab 200 Units Take 2 Doses by mouth daily.  multivitamin, tx-iron-ca-min (THERA-M W/ IRON) 9 mg iron-400 mcg tab tablet Take 1 Tab by mouth daily.       estradiol (ESTRACE) 0.01 % (0.1 mg/gram) vaginal cream Insert 2 g into vagina daily.        Allergies   Allergen Reactions    Adhesive Rash    Sulfa (Sulfonamide Antibiotics) Rash       Family History   Problem Relation Age of Onset    Cancer Father     Cancer Brother      Social History     Tobacco Use    Smoking status: Never Smoker    Smokeless tobacco: Never Used   Substance Use Topics    Alcohol use: No         Gisselle Rodriguez MD

## 2021-11-04 NOTE — PATIENT INSTRUCTIONS
Medicare Wellness Visit, Female The best way to live healthy is to have a lifestyle where you eat a well-balanced diet, exercise regularly, limit alcohol use, and quit all forms of tobacco/nicotine, if applicable. Regular preventive services are another way to keep healthy. Preventive services (vaccines, screening tests, monitoring & exams) can help personalize your care plan, which helps you manage your own care. Screening tests can find health problems at the earliest stages, when they are easiest to treat. [facility] follows the current, evidence-based guidelines published by the [facility] ([facility]) when recommending preventive services for our patients. Because we follow these guidelines, sometimes recommendations change over time as research supports it. (For example, mammograms used to be recommended annually. Even though Medicare will still pay for an annual mammogram, the newer guidelines recommend a mammogram every two years for women of average risk). Of course, you and your doctor may decide to screen more often for some diseases, based on your risk and your co-morbidities (chronic disease you are already diagnosed with). Preventive services for you include: - Medicare offers their members a free annual wellness visit, which is time for you and your primary care provider to discuss and plan for your preventive service needs. Take advantage of this benefit every year! 
-All adults over the age of 72 should receive the recommended pneumonia vaccines. Current USPSTF guidelines recommend a series of two vaccines for the best pneumonia protection.  
-All adults should have a flu vaccine yearly and a tetanus vaccine every 10 years.  
-All adults age 48 and older should receive the shingles vaccines (series of two vaccines).      
-All adults age 38-68 who are overweight should have a diabetes screening test once every three years.  
-All adults born between [birth year] and 1965 should be screened once for Hepatitis C. 
-Other screening tests and preventive services for persons with diabetes include: an eye exam to screen for diabetic retinopathy, a kidney function test, a foot exam, and stricter control over your cholesterol.  
-Cardiovascular screening for adults with routine risk involves an electrocardiogram (ECG) at intervals determined by your doctor.  
-Colorectal cancer screenings should be done for adults age 54-65 with no increased risk factors for colorectal cancer. There are a number of acceptable methods of screening for this type of cancer. Each test has its own benefits and drawbacks. Discuss with your doctor what is most appropriate for you during your annual wellness visit. The different tests include: colonoscopy (considered the best screening method), a fecal occult blood test, a fecal DNA test, and sigmoidoscopy. 
 
-A bone mass density test is recommended when a woman turns 65 to screen for osteoporosis. This test is only recommended one time, as a screening. Some providers will use this same test as a disease monitoring tool if you already have osteoporosis. -Breast cancer screenings are recommended every other year for women of normal risk, age 54-69. 
-Cervical cancer screenings for women over age 72 are only recommended with certain risk factors. Here is a list of your current Health Maintenance items (your personalized list of preventive services) with a due date: 
Health Maintenance Due Topic Date Due  
 DTaP/Tdap/Td  (1 - Tdap) Never done  Bone Mineral Density   Never done  Pneumococcal Vaccine (2 of 2 - PPSV23) 07/16/2016  COVID-19 Vaccine (3 - Pfizer booster) 08/05/2021 [facility] Annual Well Visit  11/13/2021

## 2021-11-15 DIAGNOSIS — I10 ESSENTIAL HYPERTENSION: ICD-10-CM

## 2021-11-15 RX ORDER — VERAPAMIL HYDROCHLORIDE 240 MG/1
TABLET, FILM COATED, EXTENDED RELEASE ORAL
Qty: 90 TABLET | Refills: 0 | Status: SHIPPED | OUTPATIENT
Start: 2021-11-15 | End: 2022-02-15

## 2022-01-10 ENCOUNTER — HOSPITAL ENCOUNTER (OUTPATIENT)
Dept: PHYSICAL THERAPY | Age: 87
Discharge: HOME | End: 2022-01-10
Payer: MEDICARE

## 2022-01-10 PROCEDURE — 97530 THERAPEUTIC ACTIVITIES: CPT

## 2022-01-10 PROCEDURE — 97162 PT EVAL MOD COMPLEX 30 MIN: CPT

## 2022-01-10 PROCEDURE — 97140 MANUAL THERAPY 1/> REGIONS: CPT

## 2022-01-10 NOTE — PROGRESS NOTES
[address]  [facility]  [address] [facility]  [address]  [place]      INITIAL EVALUATION    NAME: Esther Beckman AGE: 80 y.o. GENDER: female  DATE: 1/10/22  REFERRING PHYSICIAN: Dr. Rosa Maher  HISTORY AND BACKGROUND:  Patient reports 2-3 mo exacerbation of b/l mainly below knee LE swelling with recent infection. Does report swelling that goes into the knees and causes pain and limits walking. Reports 20+ year h/o swelling and has used knee high compression stockings and Vasopneumatic pump for management. States \"its so hard to use those stockings\" and gave them up. Continues to utilize compression pump 2x/day for 45min each. States pump sleeve only goes just above her knees. Spouse present entire session. Daughter present last half of evaluation. PMHx: R LE Mohs procedure for Melanoma. Reports no regional lymph node removal.   Primary Diagnosis:  B/L LE lymphedema, secondary (I89.0)    Other Treatment Diagnoses:  Abnormality of gait ( other abnormalities of gait and mobility R26.89)  Swelling not relieved by elevation ( R60.9 edema)  Venous insufficiency I87.2    Date of Onset: 20 + year h/o swelling with more recent exacerbation 11/2021  Present Symptoms and Functional Limitations: swelling that goes from toes to upper thigh and has Limited her in wearing shoes due to size of feet, decreased walking ability due to swelling, open wounds at times with leaking from her legs. Most recent cellulitis infection 11/2021 (reports 3x total) and need for antibiotics. Difficulty with wound healing R LE mohs procedure.     Past Medical History:   Past Medical History:   Diagnosis Date    Cataract     bilateral eyes    Frequent urinary tract infections     H/O: hysterectomy     Hypertension     Melanoma ([address]     leg    Thyroid disease      Past Surgical History:   Procedure Laterality Date    HX HERNIA REPAIR x2    HX ORTHOPAEDIC      bilateral TKR    HX ORTHOPAEDIC      left arm fracture repair    HX OTHER SURGICAL      MOES surgery    IR CHOLECYSTOSTOMY PERCUTANEOUS       Current Medications:    Current Outpatient Medications   Medication Sig    levothyroxine (SYNTHROID) 75 mcg tablet TAKE 1 TABLET BY MOUTH ONCE DAILY BEFORE BREAKFAST    verapamil ER (CALAN-SR) 240 mg CR tablet TAKE 1 TABLET BY MOUTH ONCE DAILY AT  NIGHT    cephALEXin (KEFLEX) 500 mg capsule Take 1 Capsule by mouth two (2) times a day.  labetaloL (NORMODYNE) 200 mg tablet Take 1 Tab by mouth two (2) times a day.  B.infantis-B.ani-B.long-B.bifi (PROBIOTIC 4X) 10-15 mg TbEC Take  by mouth daily.  famotidine (PEPCID) 20 mg tablet Take 20 mg by mouth two (2) times a day.  mirabegron ER (MYRBETRIQ) 25 mg ER tablet Take 50 mg by mouth daily.  aluminum-magnesium hydroxide 200-200 mg/5 mL susp 5 mL, diphenhydrAMINE 12.5 mg/5 mL liqd 5 mL, lidocaine 2 % soln 5 mL 5 mL by Swish and Spit route two (2) days a week. Magic mouth wash   Maalox  Lidocaine 2% viscous   Diphenhydramine oral solution     Pharmacy to mix equal portions of ingredients to a total volume as indicated in the dispense amount.  cranberry fruit concentrate (AZO CRANBERRY PO) Take  by mouth.  calcium carbonate 500 mg calcium (1,250 mg) tab 500 mg, ergocalciferol (vitamin d2) 400 unit tab 200 Units Take 2 Doses by mouth daily.  multivitamin, tx-iron-ca-min (THERA-M W/ IRON) 9 mg iron-400 mcg tab tablet Take 1 Tab by mouth daily.  estradiol (ESTRACE) 0.01 % (0.1 mg/gram) vaginal cream Insert 2 g into vagina daily. No current facility-administered medications for this encounter. Allergies:    Allergies   Allergen Reactions    Adhesive Rash    Sulfa (Sulfonamide Antibiotics) Rash      Prior Level of Function/Social/Work History/Personal factors and/or comorbidities impacting plan of care:    Living Situation: [facility] with spouse   Trainable Caregiver?: Spouse, daughter, self   Self-care/ADLs: Indep upper, needs assistance with shoes/socks/foot/leg care   Mobility: Mod I   Sleeping Arrangement:  Bed   Adaptive Equipment Owned: Reports owning a compression pump that goes to her knees. Using currently 2x/day for 45 min each  Previous Therapy:  Wound care for non healing leg wound x 6-12 mo; compression stockings in the past-knee highs not sure of compression class. Not currently using due to size of legs  Compression/Lymphedema Equipment:  Nothing today    SUBJECTIVE:  Patient reporting 2-3 mo h/o increased leg swelling with cellulitis infection 11/021 on top of 20+year h/o swelling. R Leg melanoma removal with difficulty with wound healing. Blisters that open and leak. Patients goals for therapy:\" to see what I can do for my legs\"       OBJECTIVE DATA SUMMARY:   EXAMINATION/PRESENTATION/DECISION MAKING:   Pain:5/10           Self-Care and ADLs:  Grooming: Independent  Bathing: Modified independent    UB Dressing: Independent  LB Dressing: Modified Independent   Don/Doff Shoes/Socks:  Total assistance  Toileting: Modified independent      Skin and Tissue Assessment:  Dermal Status:  [x]   Intact [x]  Dry   []  Tenuous [x] Flaky   []  Wound/lesion present [x]  Scars:  R ant shin (MOES) B/L TKR scars   []  Dermatitis    Texture/Consistency:  [x]  Boggy []  Pitting Edema   [x]  Brawny [x]  Combination   []  Fibrotic/Woody    Pigmentation/Color Change:  []  Normal []  Hemosiderin   []  Red []  Erythematous   [x]  Hyperpigmented []  Hyperlipodermatosclerosis   Anomalies:  []  Lymphorrhea []  Vesicles   []  Petechiae []  Warty Vercusis   []  Bullae [x]  Papilloma   Circulatory:  []  WESTLEY []  Varicosities:   []  Pulses DP / PT []  Vascular studies ruled out DVT in past   []  DVT History    Nails:  []   Normal  [x]   Fungus  Stemmers Sign: Positive B/L LE  Height:     Weight:    191lbs  BMI:     (36 or greater: adversely affecting lymphedema)    Volumetric Measurements: Right:  5,685.50mL Left:  5,916.02mL   % Difference: 4.05% L>R   (See scanned graph)    Observation:    B/L LE anterior view   B/L Dorsal feet     R lateral calf    Range of Motion: WFL  Strength: 3+/5  Sensation:  Intact to LT  Mobility:  Bed/Chair Mobility:  Modified independent  Transfers:  Modified independent    Sitting Balance:  Good, intact no support Standing Balance:  Fair   Gait:  Modified independent  Wheelchair Mobility:  NT   Endurance:  NT Stairs:  NT     Safety:  Patient is alert and oriented: A+O x 4   Safety awareness:  yes   Fall Risk?:  Yes using rollator currently. Reports no falls in last year. Patient given written fall prevention handout: Yes   Precautions:  Standard lymphedema precautions to include avoiding blood pressure readings, injections and IVs or other procedures/acts that could lead to broken skin on affected area, and avoiding excessive heat, resistive activity or altitude without compression garment    Swelling Severity:  International Society of Lymphology clinical classification:  []  Stage 0: Latent or subclinical condition where swelling is not yet evident despite impaired lymph transport, subtle alterations in tissue fluid/composition and changes in subjective symptoms. It may exist months or years before overt edema occurs. []  Stage I: Early accumulation of fluid relatively high in protein content (e.g., in comparison with venous edema) which subsides with limb elevation. Pitting may occur. An increase in various types of proliferating cells may also be seen.  []  Stage II: Limb elevation alone rarely reduces the tissue swelling and pitting is manifest.  Later in Stage II, the limb may not pit as excess subcutaneous fat and fibrosis develop.   [x]  Stage III: Lymphostatic elephantiasis where pitting can be absent and trophic skin changes such as acanthosis, alterations in skin character and thickness, further deposition of fat and fibrosis, and warty overgrowths have developed. Functional Measure:   Lymphedema Life Impact Score:  49% Impairment    Physical Therapy Evaluation Charge Determination   History Examination Presentation Decision-Making   MEDIUM  Complexity : 1-2 comorbidities / personal factors will impact the outcome/ POC  MEDIUM Complexity : 3 Standardized tests and measures addressing body structure, function, activity limitation and / or participation in recreation  MEDIUM Complexity : Evolving with changing characteristics  Other outcome measures LLIS  MEDIUM      Based on the above components, the patient evaluation is determined to be of the following complexity level: MEDIUM    Evaluation Time: 1043-11:17  TREATMENT PROVIDED:   1. Treatment description:  The patient was education regarding the role and function of the lymphatic system, and instructed in the lymphedema management protocol of complete decongestive therapy (CDT). This includes skin care to prevent breakdown or infection, lymphedema exercises, custom compression therapy options (bandaging, compression garments, compression pump, Vergil Font, JoViPak, The Anish-Santy, etc. as needed), and decongestion with manual lymphatic drainage as indicated. We discussed the need for reduction of size of LE's by using MLB for 4-5 weeks and then transitioning into a Velcro style compression device. We discussed the need for a pump assessment based upon current device has short pump sleeves and ends just above the knee joint. Pt was informed that Medicare does not cover DME supplies and all compression equipment will be an OOP expense. Given information on where to obtain bandaging supplies with approximate cost.  Patient given information on needing larger shoe/slipper with rubber bottom and heel. Showed daughter on [address] preferred style to keep patient safe and prevent falls as patient already using a rollator. Patient's spouse to be using lotion on LE's 2x/day to improve skin condition.   Patient seems reluctant due to burden on family however family is willing to assist her through treatment. Treatment time:  2777-4673  Minutes: 33 minutes    ASSESSMENT:   Marty Moreira is a 80 y.o. female who presents with stage 3 secondary lymphedema with contributing CVI. Patient reports 20+ year h/o episodic swelling. Patient reports h/o multiple infections most recent 11/2021. Patient reports h/o lymphorrhea intermittently and poor wound healing after Mohs surgery R anterior tibia. Patient with skin changes associated with chronic swelling such as fibrosis, papillomas, warty overgrowths, dryness, taut skin with poor mobility and turgor. Patient is at risk for future skin infections and wounds due to condition. Patients limb volume is 4% and both appear similiarly swollen with L LE larger than the R LE. Patient will benefit from reduction first with 3 layer bandages followed by either custom compression stockings or Velcro style compression. Patient rely's 100% on caregiver support for LE care and family is present and supportive of treatment today. Patient will also benefit from a Vasopneumatic pump assessment as her device is currently too short. Patient will benefit from complete decongestive therapy (CDT) including manual lymphatic drainage (MLD) technique, short-stretch textile bandages/compression system to decongest limb, and kinesiotaping as appropriate. Patient will receive instruction in proper skin care to recognize signs/symptoms of and prevent infection, therapeutic exercise, and self-MLD for independent home program and restorative lymphatic performance. This care is medically necessary due to the infection risk with lymphedema, and to improve functional activities. CDT is necessary to resolve swelling to allow patient to return to wearing normal clothes/footwear, and prevent worsening of symptoms, such as venous stasis ulcerations, infections, or hospitalizations.   Patient will be independent with home program strategies to allow improved ADL ability and mobility and to allow patient to return to greatest functional independence. Rehabilitation potential is considered to be Fair. Factors which may influence rehabilitation potential include motivation, caregiver assistance, transportation, financial.  Patient will benefit from 2x/week physical therapy visits over 10 weeks to optimize improvement in these areas. PLAN OF CARE:   Recommendations and Planned Interventions:  Manual lymph drainage/complete decongestive therapy  Multi-layer compression bandaging (short-stretch)  Compression garment fitting/provision  Lymphedema therapeutic exercise  AROM/PROM/Strength/Coordination  Self-care training  Functional mobility training  Education in skin care and lymphedema precautions  Self-MLD education per home program  Self-bandaging education per home program  Caregiver education as needed  Wound care as needed     GOALS  Short term goals  Time frame: 3 weeks  1. Patient will demonstrate knowledge of signs/symptoms of infections/cellulitis and be independent in skin care to prevent cellulitis. 2.  Patient will demonstrate independence in lymphedema home program of therapeutic exercises to improve circulation and decongest lymphedema to improve ADLs. 3.  Patient will tolerate multi-layer bandages (MLB) and show measurable decrease of <10% difference in volume of L LE vs R LE, to allow ordering of home compression system (daytime, nighttime garments and pump as needed). 4. Pt will verbalize understanding of lymphedema precautions including need for compression during high risk activities such as air travel, long car rides, and heavy weightlifting. Long term goals  Time frame: 6 weeks  1. Pt will show improvement in Lymphedema Life Impact Scale by 10 points for improved tolerance for work and recreational activities and thus allow improvement in patient's quality of life.   2. Patient/family/caregiver will be independent with don/doff of compression system for LUE and use in order to prevent reaccumulation of fluid at discharge. 3.  Pt will be independent in self-MLD and show stable limb volumes showing decongestion and pt. ready for transition to independent restorative phase of lymphedema therapy. Patient has participated in goal setting and agrees to work toward plan of care. Patient was instructed to call if questions or concerns arise. Thank you for this referral.  Nikolay Lynn DPT       TREATMENT PLAN EFFECTIVE DATES:   1/10/2022 TO 4/6/22  I have read the above plan of care for [facility]. I certify the above prescribed services are required by this patient and are medically necessary.   The above plan of care has been developed in conjunction with the lymphedema/physical therapist.       Physician Signature: ____________________________________Date:______________

## 2022-01-13 DIAGNOSIS — I10 ESSENTIAL HYPERTENSION: ICD-10-CM

## 2022-01-13 RX ORDER — LABETALOL 200 MG/1
TABLET, FILM COATED ORAL
Qty: 180 TABLET | Refills: 0 | Status: SHIPPED | OUTPATIENT
Start: 2022-01-13 | End: 2022-07-31

## 2022-01-13 NOTE — PROGRESS NOTES
Statement of Medical Necessity  Page 1 of 2      Felipe Reynolds 1934 Today's Date: 1/13/2022 MARTINA: [employer]   Payor: Alex Babin / Plan: [place] PART [place] & B / Product Type: Medicare /  ME: TBD  Refills: 2                   Diagnosis  []   I97.2 Post-Mastectomy Lymphedema []   I87.2 Venous Insufficiency   [x]   I89.0 Lymphedema, other secondary  []   I83.019 Venous Stasis Ulcer LE, Right   []   I89.9 Unspecified Lymphatic Disorder []   I83.029 Venous Stasis Ulcer LE, Left   [x]   R60.9 Swelling not relieved by elevation []   Q82.0 Hereditary/ Congenital Lymphedema   []   C50.211 Malignant neoplasm of breast, Right []   G89.3  Cancer associated pain   []   C50.212 Malignant neoplasm of breast, Left []   L03.115 LE Cellulitis, Right   []    []   L03.116 LE Cellulitis, Left                                     Felipe Reynolds    1934  Page 2 of 2    Physician Order for DME for Diagnosis of Lymphedema secondary as Listed on Statement of Medical Necessity, Page 1         Recommended Product:  Units Upper Extremity Rt Lt Units Lower Extremity Rt Lt    Circ-Aid, Ready Wrap, Sigvaris Arm   2 Inelastic binders (Circ-Aid, Farrow)  [x]Foot   [x]Below Knee   []Knee   []Thigh X X    Circ-Aid Ready Wrap, Sigvaris hand    Jassi Noah, night use []Full Leg  []Lower Leg      Tribute Arm, night use    Tribute, night use  []Full Leg  []Lower Leg      Jassi Noah Arm, night use    Jordan Sleeve Leg/ Foot, night use      Gradiant Compression Sleeves & Gloves  []Custom [] RM Arm:  []CCL1 []CCL2 []CCL3  []Custom [] RM Glove: []CCL1 []CCL2 []CCL3     2 Gradient Compression Stockings   [x]Custom  []RM Lower Extremity:   [x]CCL1       []CCL2         []CCL3   [x]Knee       [x]Thigh        []Waist Length X X    Jordan sleeve arm w/ hand, night use    Tribute Wrap, night use      Compression Bra   1 Vasopneumatic Pump X X    Compression Vest         The above patient was referred for treatment of Lymphedema due to the diagnosis indicated above. Lymphedema is a progressive and chronic condition. It may cause acute infections, muscle atrophy, discomfort, and connective tissue fibrosis with irreversible tissue damage, decreased ROM in the affected limb and diminished functional mobility possibly interfering with independence and ability to work. Recurrent infections and wound complications that commonly occur with Lymphedema often require hospitalization and extensive wound care, thus increasing medical costs. The patient has received complete decongestive therapy which includes manual lymphatic drainage, lymphedema specific exercises, compression bandaging, and hygiene/skin care. Goals of therapy are to reduce the edema and prevent re-accumulation of fluid with its complications. It is medically necessary for this patient to have daytime garments to control this condition. They will need 2 sets (one set to wear and one set to wash for adequate skin care and wearing time). Garments must be replaced every 4-6 months for effectiveness. There are no substitutes available that offer acceptable compression treatment for this Lymphedema patient. If further information is requested, please contact our certified lymphedema therapists at [phone].     [] Em Moreno, PT, DPT, PARTHA  [] Randy Jeffers PT, PARTHA  [x] Bimal Cervantes, PT, DPT, [facility]      Printed  Provider Name  Provider Signature  Date    Provider NPI

## 2022-01-31 ENCOUNTER — HOSPITAL ENCOUNTER (OUTPATIENT)
Dept: PHYSICAL THERAPY | Age: 87
Discharge: HOME | End: 2022-01-31
Payer: MEDICARE

## 2022-01-31 PROCEDURE — 97140 MANUAL THERAPY 1/> REGIONS: CPT

## 2022-01-31 NOTE — PROGRESS NOTES
LYMPHEDEMA PT DAILY TREATMENT NOTE - [facility] 2-15    Patient Name: Purnima Galloway  Date:2022  : 1934  [x]  Patient  Verified  Payor: Arlene Forth / Plan: [place] MEDICARE PART A & B / Product Type: Medicare /    In time: 2:05 pm  Out time: 2:55 pm  Total Treatment Time (min): 50  Total Timed Codes (min): 50  1:1 Treatment Time ( only): 50   Visit #:  2    Treatment Area: Lymphedema, not elsewhere classified [I89.0]    SUBJECTIVE  Pain Level (0-10 scale): 4/10 B LEs  Any medication changes, allergies to medications, adverse drug reactions, diagnosis change, or new procedure performed?: [x] No    [] Yes (see summary sheet for update)  Subjective functional status/changes:   [] No changes reported  Patient arrives to [facility] ambulatory with rollator, accompanied by her daughter. Pt says she is nervous about wrapping her legs, afraid she \"won't be able to walk. \" Pt agreeable to proceed with bandaging one leg today to assess tolerance. OBJECTIVE     min Therapeutic Exercise:  [] Machanel Wojciech Exercises [x] Remedial Lymphedema Exercise Program [] Axillary Web Exercise Program      [] Shoulder ROM Exercises    Encouraged ankle pumps and LAQs while seated. Pt's daughter reports pt very sedentary during the day. Educated pt on importance of exercise and walking in reducing swelling. Rationale: Activate muscle pump to improve lymphatic fluid movement and decrease swelling to improve the patients ability to perform ADL and IADL skills and prevent worsening of swelling over time. 50 min Manual Therapy    Manual Lymphatic Drainage (MLD):  Area to decongest: LE: bilateral foot ankle calf mid knee   Sequence used and effectiveness: not performed today   Skin/wound care/debridement: Reviewed skin care principles:   Performed skin care with low pH lotion following manual lymph principles. Skin care products  Hygiene  Prevention of cellulitis  Wound care     Pt presents with significant dryness LEs.  Pt says healed wound on right anterior lower leg sometimes weeps at night. Applied [employer] B LEs. Added ABD pad over healed wound on right lower leg for padding and to absorb any drainage. Applied multi-layer compression bandaging to: Patient arrived without adequate bandage in place that was applied by caregiver. right  lower extremity: below knee    The following multi-layer bandages were applied:  Protouch Stockinette    Padding layer:  Cast padding to bulk out ankle  Fleece    Foam:  10 cm roll    Short stretch bandages:   6 cm  8 cm  10 cm     Applied bandages with light tension only today per pt/daughter request. Applied Tg fix to cover entire bandages to help prevent sliding. Pt demonstrated safe ambulation using rollator and new hard bottomed slipper that daughter purchased. Pt concerned about getting up to the bathroom at night. Recommend pt sleep with hospital gripper socks on for safety (provided with 2 pairs today). Compression bandaging instructions:  1. Compression bandaging will be applied twice a week by therapist in clinic, with adjustments to be made at home as indicated if bandaging becomes loose or uncomfortable. 2. If tolerated, remain bandaged between appointments with therapist, removing under the following conditionsDO NOT WAIT FOR A RETURN PHONE [facility]:  -Numbness/tingling in extremity different from what you have experienced without the bandages in place.  -Compromise in circulation, monitoring blood refill into toes after applying gentle pressure to toes.  -Onset of pain in extremity that is sudden and severe in nature. -Redness, warmth in limb, and/or fever, flu-like symptoms, which may indicate infection. If this occurs, call your doctor right away. If you note a sudden increase in swelling in extremity, with or without redness/warmth, go to the emergency room as soon as possible to have blood clot ruled out.     3. Compression bandaging supplies that can be laundered are the brown compression wraps and any foam applied for padding. Launder in washer/dryer with NO fabric softener, bleach, woolite. Dry on a low heat. 4. Once compression garments are ordered, compression bandaging will be continued until garments arrive for fitting. This process can take several weeks. Upper/Lower Extremity Compression: to be measured for compression garments once swelling reduced. Rationale: decrease pain, increase ROM, increase tissue extensibility and decrease edema  to improve the patients ability to perform ADLs and functional tasks and also to allow pt to wear normal size shoe. With   [] TE   [] TA   [x] MT   [] SC   [] other: Patient Education: [x] Review HEP    [x] MLD Patient Education Continued education in self MLD technique with bathing and skin care. [] Progressed/Changed HEP based on:   [] positioning   [] Kinesiotape   [x] Skin care   [x] wound care   [] other:   [x] Patient/caregiver in multi-layer bandaging donning principles. , Precautions. and Handout provided. Patient / caregiver re-demonstrated bandaging. [] Yes  [x] No  Compression bandaging/garment precautions reviewed: [x] Yes  [] No     Other Objective/Functional Measures:   Height:     Weight:      BMI:     (36 or greater: adversely affecting lymphedema)    Pain Level (0-10 scale) post treatment: 4/10      ASSESSMENT/Changes in Function:   Pt with good tolerance to right LE MLB today. Decision made to only bandage one leg today since pt nervous about wrapping and to see how she tolerates it. Will progress to B LE knee high MLB next visit if pt tolerates it well. Will plan to measure for garments (velcro wraps vs custom stockings) once limb volumes reduced. Advised pt she can continue using basic pump on left LE until we start bandaging, but not to use it over bandages on right LE.    Patient will continue to benefit from skilled PT services to  address functional mobility deficits, address ROM deficits, address swelling, analyze and address soft tissue restrictions, analyze compression product fit and use, instruct in home and community integration, instruct in home lymphedema management program, measure for compression products and modify and progress therapeutic interventions to attain remaining goals. [x]  See Plan of Care  []  See progress note/recertification  []  See Discharge Summary         Progress towards goals / Updated goals:  Progressing towards goals. PLAN  []  Upgrade activities as tolerated     [x]  Continue plan of care  []  Update interventions per flow sheet       []  Discharge due to:_  [x]  Other: progress to B LE MLB next visit? Pricilla Mcgovern, PT, DPT, CLT-JÚNIOR  1/31/2022

## 2022-02-03 ENCOUNTER — HOSPITAL ENCOUNTER (OUTPATIENT)
Dept: PHYSICAL THERAPY | Age: 87
Discharge: HOME | End: 2022-02-03
Payer: MEDICARE

## 2022-02-03 PROCEDURE — 97140 MANUAL THERAPY 1/> REGIONS: CPT

## 2022-02-03 NOTE — PROGRESS NOTES
LYMPHEDEMA PT DAILY TREATMENT NOTE - [facility] 2-15    Patient Name: Natasha Coleman  SMVR:  : 1934  [x]  Patient  Verified  Payor: VA MEDICARE / Plan: VA MEDICARE PART A & B / Product Type: Medicare /    In time: 1:05 pm  Out time: 1:45 pm  Total Treatment Time (min): 40  Total Timed Codes (min): 40  1:1 Treatment Time (MC only): 40   Visit #:  3    Treatment Area: Lymphedema, not elsewhere classified [I89.0]    SUBJECTIVE  Pain Level (0-10 scale): 4/10 B LEs  Any medication changes, allergies to medications, adverse drug reactions, diagnosis change, or new procedure performed?: [x] No    [] Yes (see summary sheet for update)  Subjective functional status/changes:   [] No changes reported  Patient arrives to [facility] ambulatory with rollator, accompanied by her daughter. Pt says she tolerated bandages well on right LE, agreeable to progress to B LE bandaging today. Pt says she took bandages off at home to shower just [facility] Pt pleased with progress after just one wrapping, says \"I don't think my leg has ever been this small. \"     OBJECTIVE     min Therapeutic Exercise:  [] [place] Yenny Exercises [x] Remedial Lymphedema Exercise Program [] Axillary Web Exercise Program      [] Shoulder ROM Exercises    Encouraged ankle pumps and LAQs while seated. Pt's daughter reports pt very sedentary during the day. Educated pt on importance of exercise and walking in reducing swelling. Rationale: Activate muscle pump to improve lymphatic fluid movement and decrease swelling to improve the patients ability to perform ADL and IADL skills and prevent worsening of swelling over time. 40 min Manual Therapy    Manual Lymphatic Drainage (MLD):  Area to decongest: LE: bilateral foot ankle calf mid knee   Sequence used and effectiveness: not performed today   Skin/wound care/debridement: Reviewed skin care principles:   Performed skin care with low pH lotion following manual lymph principles.    Skin care products  Hygiene  Prevention of cellulitis  Wound care     Pt presents with significant dryness LEs. Pt says healed wound on right anterior lower leg sometimes weeps at night. Applied [employer] B LEs. Added ABD pad over healed wound on right lower leg for padding and to absorb any drainage. Applied multi-layer compression bandaging to: Patient arrived without adequate bandage in place that was applied by caregiver. bilateral  lower extremity: below knee    The following multi-layer bandages were applied:  Protouch Stockinette    Padding layer:  Cast padding to bulk out ankle  Fleece    Foam:  10 cm roll    Short stretch bandages:   6 cm - gisel sandal technique  8 cm  10 cm     Applied bandages with light tension only today per pt/daughter request. Applied Tg fix to cover entire bandages to help prevent sliding. Pt demonstrated safe ambulation using rollator and new hard bottomed slipper that daughter purchased. Pt concerned about getting up to the bathroom at night. Recommend pt sleep with hospital gripper socks on for safety (provided with 2 pairs today). Compression bandaging instructions:  1. Compression bandaging will be applied twice a week by therapist in clinic, with adjustments to be made at home as indicated if bandaging becomes loose or uncomfortable. 2. If tolerated, remain bandaged between appointments with therapist, removing under the following conditionsDO NOT WAIT FOR A RETURN PHONE [facility]:  -Numbness/tingling in extremity different from what you have experienced without the bandages in place.  -Compromise in circulation, monitoring blood refill into toes after applying gentle pressure to toes.  -Onset of pain in extremity that is sudden and severe in nature. -Redness, warmth in limb, and/or fever, flu-like symptoms, which may indicate infection. If this occurs, call your doctor right away.   If you note a sudden increase in swelling in extremity, with or without redness/warmth, go to the emergency room as soon as possible to have blood clot ruled out. 3. Compression bandaging supplies that can be laundered are the brown compression wraps and any foam applied for padding. Launder in washer/dryer with NO fabric softener, bleach, woolite. Dry on a low heat. 4. Once compression garments are ordered, compression bandaging will be continued until garments arrive for fitting. This process can take several weeks. Upper/Lower Extremity Compression: to be measured for compression garments once swelling reduced. 2/3/22: pt and daughter shown [employer] lower leg velcro garments and custom stockings. Discussed advantages of each and prices. Pt thinks she may prefer custom stocking due to lower profile and less complicated. Rationale: decrease pain, increase ROM, increase tissue extensibility and decrease edema  to improve the patients ability to perform ADLs and functional tasks and also to allow pt to wear normal size shoe. With   [] TE   [] TA   [x] MT   [] SC   [] other: Patient Education: [x] Review HEP    [x] MLD Patient Education Continued education in self MLD technique with bathing and skin care. [] Progressed/Changed HEP based on:   [] positioning   [] Kinesiotape   [x] Skin care   [x] wound care   [] other:   [x] Patient/caregiver in multi-layer bandaging donning principles. , Precautions. and Handout provided. Patient / caregiver re-demonstrated bandaging. [] Yes  [x] No  Compression bandaging/garment precautions reviewed: [x] Yes  [] No     Other Objective/Functional Measures:   Height:     Weight:      BMI:     (36 or greater: adversely affecting lymphedema)    Pain Level (0-10 scale) post treatment: 4/10      ASSESSMENT/Changes in Function:   Pt with good tolerance to bilateral LE MLB today. Noted reduction in swelling right LE after just a couple days. Pt eager to transition to garments.  Will plan to recheck volumes next visit and measure for garments (velcro wraps vs custom stockings) once limb volumes reduced. Patient will continue to benefit from skilled PT services to  address functional mobility deficits, address ROM deficits, address swelling, analyze and address soft tissue restrictions, analyze compression product fit and use, instruct in home and community integration, instruct in home lymphedema management program, measure for compression products and modify and progress therapeutic interventions to attain remaining goals. [x]  See Plan of Care  []  See progress note/recertification  []  See Discharge Summary         Progress towards goals / Updated goals:  Progressing towards goals. PLAN  []  Upgrade activities as tolerated     [x]  Continue plan of care  []  Update interventions per flow sheet       []  Discharge due to:_  [x]  Other: check volumes next visit and measure for garments if volumes reduced? Telly Mcgovern, PT, DPT, CLT-JÚNIOR  2/3/2022

## 2022-02-07 ENCOUNTER — HOSPITAL ENCOUNTER (OUTPATIENT)
Dept: PHYSICAL THERAPY | Age: 87
Discharge: HOME | End: 2022-02-07
Payer: MEDICARE

## 2022-02-07 PROCEDURE — 97140 MANUAL THERAPY 1/> REGIONS: CPT

## 2022-02-07 NOTE — PROGRESS NOTES
LYMPHEDEMA PT DAILY TREATMENT NOTE - [facility] 2-15    Patient Name: Esther Beckman  Date:2022  : 1934  [x]  Patient  Verified  Payor: VA MEDICARE / Plan: VA MEDICARE PART A & B / Product Type: Medicare /    In time: 2:15 pm  Out time: 3:05 pm  Total Treatment Time (min): 50  Total Timed Codes (min): 50  1:1 Treatment Time ( only): 50   Visit #:  4    Treatment Area: Lymphedema, not elsewhere classified [I89.0]    SUBJECTIVE  Pain Level (0-10 scale): pt denies pain   Any medication changes, allergies to medications, adverse drug reactions, diagnosis change, or new procedure performed?: [x] No    [] Yes (see summary sheet for update)  Subjective functional status/changes:   [] No changes reported  Patient arrives to [facility] ambulatory with rollator, accompanied by her daughter. Pt says she tolerated bandages well, but doesn't think left leg reduced as much as right leg. Pt says she took bandages off at home to shower just before coming to [facility]. OBJECTIVE     min Therapeutic Exercise:  [] Anitra  Exercises [x] Remedial Lymphedema Exercise Program [] Axillary Web Exercise Program      [] Shoulder ROM Exercises    Encouraged ankle pumps and LAQs while seated. Pt's daughter reports pt very sedentary during the day. Educated pt on importance of exercise and walking in reducing swelling. Rationale: Activate muscle pump to improve lymphatic fluid movement and decrease swelling to improve the patients ability to perform ADL and IADL skills and prevent worsening of swelling over time. 50 min Manual Therapy    Manual Lymphatic Drainage (MLD):  Area to decongest: LE: bilateral foot ankle calf mid knee   Sequence used and effectiveness: not performed today     Instructed in self-MLD for upper legs. Skin/wound care/debridement: Reviewed skin care principles:   Performed skin care with low pH lotion following manual lymph principles.    Skin care products  Hygiene  Prevention of cellulitis  Wound care Pt presents with significant dryness LEs. Pt says healed wound on right anterior lower leg sometimes weeps at night. Applied Eucerin/anti-itch lotion mix B LEs. Applied multi-layer compression bandaging to: Patient arrived without adequate bandage in place that was applied by caregiver. bilateral  lower extremity: below knee    The following multi-layer bandages were applied:  Protouch Stockinette    Padding layer:  Cast padding to bulk out ankle  Fleece    Foam:  10 cm roll   Added komprex II foam to dorsum of both feet     Short stretch bandages:   6 cm - gisel sandal technique  8 cm  10 cm     Applied bandages with light tension only today per pt/daughter request. Applied Tg fix to cover entire bandages to help prevent sliding. Pt demonstrated safe ambulation using rollator and new hard bottomed slipper that daughter purchased. Pt concerned about getting up to the bathroom at night. Recommend pt sleep with hospital gripper socks on for safety (provided with 2 pairs). Compression bandaging instructions:  1. Compression bandaging will be applied twice a week by therapist in clinic, with adjustments to be made at home as indicated if bandaging becomes loose or uncomfortable. 2. If tolerated, remain bandaged between appointments with therapist, removing under the following conditionsDO NOT WAIT FOR A RETURN PHONE [facility]:  -Numbness/tingling in extremity different from what you have experienced without the bandages in place.  -Compromise in circulation, monitoring blood refill into toes after applying gentle pressure to toes.  -Onset of pain in extremity that is sudden and severe in nature. -Redness, warmth in limb, and/or fever, flu-like symptoms, which may indicate infection. If this occurs, call your doctor right away. If you note a sudden increase in swelling in extremity, with or without redness/warmth, go to the emergency room as soon as possible to have blood clot ruled out. 3. Compression bandaging supplies that can be laundered are the brown compression wraps and any foam applied for padding. Launder in washer/dryer with NO fabric softener, bleach, woolite. Dry on a low heat. 4. Once compression garments are ordered, compression bandaging will be continued until garments arrive for fitting. This process can take several weeks. Upper/Lower Extremity Compression: to be measured for compression garments once swelling reduced. 2/3/22: pt and daughter shown [facility] lower leg velcro garments and custom stockings. Discussed advantages of each and prices. Pt thinks she may prefer custom stocking due to lower profile and less complicated for spouse who will be assisting. Rationale: decrease pain, increase ROM, increase tissue extensibility and decrease edema  to improve the patients ability to perform ADLs and functional tasks and also to allow pt to wear normal size shoe. With   [] TE   [] TA   [x] MT   [] SC   [] other: Patient Education: [x] Review HEP    [x] MLD Patient Education Continued education in self MLD technique with bathing and skin care. [] Progressed/Changed HEP based on:   [] positioning   [] Kinesiotape   [x] Skin care   [x] wound care   [] other:   [x] Patient/caregiver in multi-layer bandaging donning principles. , Precautions. and Handout provided. Patient / caregiver re-demonstrated bandaging. [] Yes  [x] No  Compression bandaging/garment precautions reviewed: [x] Yes  [] No     Other Objective/Functional Measures:   Volumetric Measurements:     Date:  Right Left % difference    1/10/22 5685.5 5916.02 4.05   2/7/22 5642.45 6073.64 7.64                   Height:     Weight:      BMI:     (36 or greater: adversely affecting lymphedema)    Pain Level (0-10 scale) post treatment: 4/10      ASSESSMENT/Changes in Function:   Pt with good tolerance to bilateral LE MLB today. Pt eager to transition to garments.  Will plan to recheck volumes next visit and measure for garments (velcro wraps vs custom stockings) once limb volumes reduced. Patient will continue to benefit from skilled PT services to  address functional mobility deficits, address ROM deficits, address swelling, analyze and address soft tissue restrictions, analyze compression product fit and use, instruct in home and community integration, instruct in home lymphedema management program, measure for compression products and modify and progress therapeutic interventions to attain remaining goals. [x]  See Plan of Care  []  See progress note/recertification  []  See Discharge Summary         Progress towards goals / Updated goals:  Progressing towards goals. PLAN  []  Upgrade activities as tolerated     [x]  Continue plan of care  []  Update interventions per flow sheet       []  Discharge due to:_  [x]  Other: check volumes next visit and measure for garments if volumes reduced? Naila Mcgovern, PT, DPT, CLT-JÚNIOR  2/7/2022

## 2022-02-10 ENCOUNTER — HOSPITAL ENCOUNTER (OUTPATIENT)
Dept: PHYSICAL THERAPY | Age: 87
Discharge: HOME | End: 2022-02-10
Payer: MEDICARE

## 2022-02-10 PROCEDURE — 97140 MANUAL THERAPY 1/> REGIONS: CPT

## 2022-02-10 NOTE — PROGRESS NOTES
LYMPHEDEMA PT DAILY TREATMENT NOTE - [facility] -15    Patient Name: Yariel London  Date:2/10/2022  : 1934  [x]  Patient  Verified  Payor: [place] MEDICARE / Plan: [place] MEDICARE PART A & B / Product Type: Medicare /    In time: 1:00 pm  Out time: 1:45 pm  Total Treatment Time (min): 45  Total Timed Codes (min): 45  1:1 Treatment Time ( [address] only): 39   Visit #:  5    Treatment Area: Lymphedema, not elsewhere classified [I89.0]    SUBJECTIVE  Pain Level (0-10 scale): pt denies pain   Any medication changes, allergies to medications, adverse drug reactions, diagnosis change, or new procedure performed?: [x] No    [] Yes (see summary sheet for update)  Subjective functional status/changes:   [] No changes reported  Patient arrives to [facility] ambulatory with rollator, accompanied by her daughter. Pt says she tolerated bandages well, but doesn't think left leg reduced as much as right leg. Pt says she took bandages off at home to shower just before [facility]. OBJECTIVE     min Therapeutic Exercise:  [] Lucio Rafat Exercises [x] Remedial Lymphedema Exercise Program [] Axillary Web Exercise Program      [] Shoulder ROM Exercises    Encouraged ankle pumps and LAQs while seated. Pt's daughter reports pt very sedentary during the day. Educated pt on importance of exercise and walking in reducing swelling. Rationale: Activate muscle pump to improve lymphatic fluid movement and decrease swelling to improve the patients ability to perform ADL and IADL skills and prevent worsening of swelling over time. 45 min Manual Therapy    Manual Lymphatic Drainage (MLD):  Area to decongest: LE: bilateral foot ankle calf mid knee   Sequence used and effectiveness: not performed today     Instructed in self-MLD for upper legs. Skin/wound care/debridement: Reviewed skin care principles:   Performed skin care with low pH lotion following manual lymph principles.    Skin care products  Hygiene  Prevention of cellulitis  Wound care Pt presents with significant dryness LEs. Pt says healed wound on right anterior lower leg sometimes weeps at night. Applied Eucerin/anti-itch lotion mix B LEs. Applied multi-layer compression bandaging to: Patient arrived without adequate bandage in place that was applied by caregiver. bilateral  lower extremity: below knee    The following multi-layer bandages were applied:  Protouch Stockinette    Padding layer:  Cast padding to bulk out ankle  Fleece    Foam:  10 cm roll   Added komprex II foam to dorsum of both feet  Added gray kidney shaped foam to medial malleoli bilaterally      Short stretch bandages:   6 cm - gisel sandal technique  8 cm  10 cm     Applied bandages with light tension only today per pt/daughter request. Applied Tg fix to cover entire bandages to help prevent sliding. Pt demonstrated safe ambulation using rollator and new hard bottomed slipper that daughter purchased. Pt concerned about getting up to the bathroom at night. Recommend pt sleep with hospital gripper socks on for safety (provided with 2 pairs). Compression bandaging instructions:  1. Compression bandaging will be applied twice a week by therapist in clinic, with adjustments to be made at home as indicated if bandaging becomes loose or uncomfortable. 2. If tolerated, remain bandaged between appointments with therapist, removing under the following conditionsDO NOT WAIT FOR A RETURN PHONE [facility]:  -Numbness/tingling in extremity different from what you have experienced without the bandages in place.  -Compromise in circulation, monitoring blood refill into toes after applying gentle pressure to toes.  -Onset of pain in extremity that is sudden and severe in nature. -Redness, warmth in limb, and/or fever, flu-like symptoms, which may indicate infection. If this occurs, call your doctor right away.   If you note a sudden increase in swelling in extremity, with or without redness/warmth, go to the emergency room as soon as possible to have blood clot ruled out. 3. Compression bandaging supplies that can be laundered are the brown compression wraps and any foam applied for padding. Launder in washer/dryer with NO fabric softener, bleach, woolite. Dry on a low heat. 4. Once compression garments are ordered, compression bandaging will be continued until garments arrive for fitting. This process can take several weeks. Upper/Lower Extremity Compression: to be measured for compression garments once swelling reduced. 2/3/22: pt and daughter shown [facility] lower leg velcro garments and custom stockings. Discussed advantages of each and prices. Pt thinks she may prefer custom stocking due to lower profile and less complicated for spouse who will be assisting. Rationale: decrease pain, increase ROM, increase tissue extensibility and decrease edema  to improve the patients ability to perform ADLs and functional tasks and also to allow pt to wear normal size shoe. With   [] TE   [] TA   [x] MT   [] SC   [] other: Patient Education: [x] Review HEP    [x] MLD Patient Education Continued education in self MLD technique with bathing and skin care. [] Progressed/Changed HEP based on:   [] positioning   [] Kinesiotape   [x] Skin care   [x] wound care   [] other:   [x] Patient/caregiver in multi-layer bandaging donning principles. , Precautions. and Handout provided. Patient / caregiver re-demonstrated bandaging.  [] Yes  [x] No  Compression bandaging/garment precautions reviewed: [x] Yes  [] No     Other Objective/Functional Measures:   Affected Side: B LEs, L>R   Left (cm) Right (cm)   5th Tuberosity 23    22.5      Ankle 24.8    23.5      Calf 41.9    39.6      Mid Knee 49.8    47.8          Volumetric Measurements:     Date:  Right Left % difference    1/10/22 5685.5 5916.02 4.05   2/7/22 5642.45 6073.64 7.64                   Height:     Weight:      BMI:     (36 or greater: adversely affecting lymphedema)    Pain Level (0-10 scale) post treatment: 4/10      ASSESSMENT/Changes in Function:   Pt with good tolerance to bilateral LE MLB today. Pt eager to transition to garments. Will plan to recheck volumes next 1-2 visits and measure for garments (velcro wraps vs custom stockings) once limb volumes reduced. Encouraged pt to come wrapped to next Thursdays appt and will plan to measure for garments that visit if volumes reduced. Patient will continue to benefit from skilled PT services to  address functional mobility deficits, address ROM deficits, address swelling, analyze and address soft tissue restrictions, analyze compression product fit and use, instruct in home and community integration, instruct in home lymphedema management program, measure for compression products and modify and progress therapeutic interventions to attain remaining goals. [x]  See Plan of Care  []  See progress note/recertification  []  See Discharge Summary         Progress towards goals / Updated goals:  Progressing towards goals. PLAN  []  Upgrade activities as tolerated     [x]  Continue plan of care  []  Update interventions per flow sheet       []  Discharge due to:_  [x]  Other: check volumes next visit and measure for garments next 1-2 visits? Radha Mcgovern, PT, DPT, CLT-JÚNIOR  2/10/2022

## 2022-02-14 ENCOUNTER — HOSPITAL ENCOUNTER (OUTPATIENT)
Dept: PHYSICAL THERAPY | Age: 87
Discharge: HOME | End: 2022-02-14
Payer: MEDICARE

## 2022-02-14 PROCEDURE — 97140 MANUAL THERAPY 1/> REGIONS: CPT

## 2022-02-14 NOTE — PROGRESS NOTES
LYMPHEDEMA PT DAILY TREATMENT NOTE - [facility] 2-15    Patient Name: Shellee Hammans  Date:2022  : 1934  [x]  Patient  Verified  Payor: VA MEDICARE / Plan: VA MEDICARE PART A & B / Product Type: Medicare /    In time: 2:05 pm  Out time: 2:50 pm  Total Treatment Time (min): 45  Total Timed Codes (min): 45  1:1 Treatment Time ( [address] only): 39   Visit #:  6    Treatment Area: Lymphedema, not elsewhere classified [I89.0]    SUBJECTIVE  Pain Level (0-10 scale): pt denies pain   Any medication changes, allergies to medications, adverse drug reactions, diagnosis change, or new procedure performed?: [x] No    [] Yes (see summary sheet for update)  Subjective functional status/changes:   [] No changes reported  Patient arrives to [facility] ambulatory with rollator, accompanied by her daughter. Pt says she tolerated bandages well, but doesn't think left leg reduced as much as right leg. Pt says she took bandages off at home to shower just before coming to [facility]. OBJECTIVE     min Therapeutic Exercise:  [] [place] Exercises [x] Remedial Lymphedema Exercise Program [] Axillary Web Exercise Program      [] Shoulder ROM Exercises    Encouraged ankle pumps and LAQs while seated. Pt's daughter reports pt very sedentary during the day. Educated pt on importance of exercise and walking in reducing swelling. Rationale: Activate muscle pump to improve lymphatic fluid movement and decrease swelling to improve the patients ability to perform ADL and IADL skills and prevent worsening of swelling over time. 45 min Manual Therapy    Manual Lymphatic Drainage (MLD):  Area to decongest: LE: bilateral foot ankle calf mid knee   Sequence used and effectiveness: not performed today     Instructed in self-MLD for upper legs. Skin/wound care/debridement: Reviewed skin care principles:   Performed skin care with low pH lotion following manual lymph principles.    Skin care products  Hygiene  Prevention of cellulitis  Wound care Pt presents with significant dryness LEs. Pt says healed wound on right anterior lower leg sometimes weeps at night. Applied aquaphor/anti-itch lotion mix B LEs. Applied multi-layer compression bandaging to: Patient arrived without adequate bandage in place that was applied by caregiver. bilateral  lower extremity: below knee    The following multi-layer bandages were applied:  Protouch Stockinette    Padding layer:  Cast padding to bulk out ankle  Fleece    Foam:  10 cm roll   komprex II foam to dorsum of both feet  Added long komprex II strips to medial and lateral sides of left calf   gray kidney shaped foam to medial malleoli bilaterally      Short stretch bandages:   6 cm - gisel sandal technique  8 cm  10 cm     Applied bandages with light tension only today per pt/daughter request. Applied Tg fix to cover entire bandages to help prevent sliding. Pt demonstrated safe ambulation using rollator and new hard bottomed slipper that daughter purchased. Pt concerned about getting up to the bathroom at night. Recommend pt sleep with hospital gripper socks on for safety (provided with 2 pairs). Compression bandaging instructions:  1. Compression bandaging will be applied twice a week by therapist in clinic, with adjustments to be made at home as indicated if bandaging becomes loose or uncomfortable. 2. If tolerated, remain bandaged between appointments with therapist, removing under the following conditionsDO NOT WAIT FOR A RETURN PHONE Trinity Gatica:  -Numbness/tingling in extremity different from what you have experienced without the bandages in place.  -Compromise in circulation, monitoring blood refill into toes after applying gentle pressure to toes.  -Onset of pain in extremity that is sudden and severe in nature. -Redness, warmth in limb, and/or fever, flu-like symptoms, which may indicate infection. If this occurs, call your doctor right away.   If you note a sudden increase in swelling in extremity, with or without redness/warmth, go to the emergency room as soon as possible to have blood clot ruled out. 3. Compression bandaging supplies that can be laundered are the brown compression wraps and any foam applied for padding. Launder in washer/dryer with NO fabric softener, bleach, woolite. Dry on a low heat. 4. Once compression garments are ordered, compression bandaging will be continued until garments arrive for fitting. This process can take several weeks. Upper/Lower Extremity Compression: to be measured for compression garments once swelling reduced. 2/3/22: pt and daughter shown [facility] lower leg velcro garments and custom stockings. Discussed advantages of each and prices. Pt thinks she may prefer custom stocking due to lower profile and less complicated for spouse who will be assisting. Rationale: decrease pain, increase ROM, increase tissue extensibility and decrease edema  to improve the patients ability to perform ADLs and functional tasks and also to allow pt to wear normal size shoe. With   [] TE   [] TA   [x] MT   [] SC   [] other: Patient Education: [x] Review HEP    [x] MLD Patient Education Continued education in self MLD technique with bathing and skin care. [] Progressed/Changed HEP based on:   [] positioning   [] Kinesiotape   [x] Skin care   [x] wound care   [] other:   [x] Patient/caregiver in multi-layer bandaging donning principles. , Precautions. and Handout provided. Patient / caregiver re-demonstrated bandaging.  [] Yes  [x] No  Compression bandaging/garment precautions reviewed: [x] Yes  [] No     Other Objective/Functional Measures:   Affected Side: B LEs, L>R   Left (cm) Right (cm)   5th Tuberosity             Ankle             Calf             Mid Knee                 Volumetric Measurements:     Date:  Right Left % difference    1/10/22 5685.5 5916.02 4.05   2/7/22 5642.45 6073.64 7.64   2/14/22 5572.89 6013.01 7.9             Height: Weight:      BMI:     (36 or greater: adversely affecting lymphedema)    Pain Level (0-10 scale) post treatment: 4/10      ASSESSMENT/Changes in Function:   Pt with good tolerance to bilateral LE MLB today. Limb volumes slightly reduced today, however pt continues with significant pitting edema, left worse than right. Pt says she took bandages off 3 hours prior to appt to shower. Encouraged pt to limit time out of bandages to <1 hour to prevent re-accumulation of fluid. Will plan to recheck volumes next 1-2 visits and measure for garments (velcro wraps vs custom stockings) once limb volumes reduced. Encouraged pt to come wrapped to next appt and will plan to measure for garments that visit if volumes reduced. Patient will continue to benefit from skilled PT services to  address functional mobility deficits, address ROM deficits, address swelling, analyze and address soft tissue restrictions, analyze compression product fit and use, instruct in home and community integration, instruct in home lymphedema management program, measure for compression products and modify and progress therapeutic interventions to attain remaining goals. [x]  See Plan of Care  []  See progress note/recertification  []  See Discharge Summary         Progress towards goals / Updated goals:  Progressing towards goals. PLAN  []  Upgrade activities as tolerated     [x]  Continue plan of care  []  Update interventions per flow sheet       []  Discharge due to:_  [x]  Other: assess response to addition of komprex II left calf, check volumes next visit and measure for garments next 1-2 visits? Minal Mcgovern, PT, DPT, CLT-JÚNIOR  2/14/2022

## 2022-02-15 DIAGNOSIS — I10 ESSENTIAL HYPERTENSION: ICD-10-CM

## 2022-02-15 RX ORDER — VERAPAMIL HYDROCHLORIDE 240 MG/1
TABLET, FILM COATED, EXTENDED RELEASE ORAL
Qty: 90 TABLET | Refills: 0 | Status: SHIPPED | OUTPATIENT
Start: 2022-02-15 | End: 2022-05-16

## 2022-02-17 ENCOUNTER — HOSPITAL ENCOUNTER (OUTPATIENT)
Dept: PHYSICAL THERAPY | Age: 87
Discharge: HOME | End: 2022-02-17
Payer: MEDICARE

## 2022-02-17 PROCEDURE — 97140 MANUAL THERAPY 1/> REGIONS: CPT

## 2022-02-17 NOTE — PROGRESS NOTES
LYMPHEDEMA PT DAILY TREATMENT NOTE - [facility] 2-15    Patient Name: Christina Mullen  Date:2022  : 1934  [x]  Patient  Verified  Payor: VA MEDICARE / Plan: VA MEDICARE PART A & B / Product Type: Medicare /    In time: 2:15 pm  Out time: 3:15 pm  Total Treatment Time (min): 60  Total Timed Codes (min): 60  1:1 Treatment Time ( only): 60  Visit #:  7    Treatment Area: Lymphedema, not elsewhere classified [I89.0]    SUBJECTIVE  Pain Level (0-10 scale): pt denies pain   Any medication changes, allergies to medications, adverse drug reactions, diagnosis change, or new procedure performed?: [x] No    [] Yes (see summary sheet for update)  Subjective functional status/changes:   [] No changes reported  Patient arrives to [facility] ambulatory with rollator, accompanied by her daughter. Pt says she tolerated bandages well, but doesn't think left leg reduced as much as right leg. Pt arrived to [facility] with MLB intact as requested previous visit. OBJECTIVE     min Therapeutic Exercise:  [] Lidia Bame Exercises [x] Remedial Lymphedema Exercise Program [] Axillary Web Exercise Program      [] Shoulder ROM Exercises    Encouraged ankle pumps and LAQs while seated. Pt's daughter reports pt very sedentary during the day. Educated pt on importance of exercise and walking in reducing swelling. Rationale: Activate muscle pump to improve lymphatic fluid movement and decrease swelling to improve the patients ability to perform ADL and IADL skills and prevent worsening of swelling over time. 60 min Manual Therapy    Manual Lymphatic Drainage (MLD):  Area to decongest: LE: bilateral foot ankle calf mid knee   Sequence used and effectiveness: not performed today     Instructed in self-MLD for upper legs. Skin/wound care/debridement: Reviewed skin care principles:   Performed skin care with low pH lotion following manual lymph principles.    Skin care products  Hygiene  Prevention of cellulitis  Wound care     Pt presents with significant dryness LEs. Pt says healed wound on right anterior lower leg sometimes weeps at night. Applied aquaphor/anti-itch lotion mix B LEs. Applied multi-layer compression bandaging to: Patient arrived without adequate bandage in place that was applied by caregiver. bilateral  lower extremity: below knee    The following multi-layer bandages were applied:  Protouch Stockinette    Padding layer:  Cast padding to bulk out ankle  Fleece    Foam:  10 cm roll   komprex II foam to dorsum of both feet  Added long komprex II strips to medial and lateral sides of left calf   gray kidney shaped foam to medial malleoli bilaterally      Short stretch bandages:   6 cm - gisel sandal technique  8 cm  10 cm     Applied Tg fix to cover entire bandages to help prevent sliding. Pt demonstrated safe ambulation using rollator and new hard bottomed slipper that daughter purchased. Pt concerned about getting up to the bathroom at night. Recommend pt sleep with hospital gripper socks on for safety (provided with 2 pairs). Compression bandaging instructions:  1. Compression bandaging will be applied twice a week by therapist in clinic, with adjustments to be made at home as indicated if bandaging becomes loose or uncomfortable. 2. If tolerated, remain bandaged between appointments with therapist, removing under the following conditionsDO NOT WAIT FOR A RETURN PHONE Trinity Gatica:  -Numbness/tingling in extremity different from what you have experienced without the bandages in place.  -Compromise in circulation, monitoring blood refill into toes after applying gentle pressure to toes.  -Onset of pain in extremity that is sudden and severe in nature. -Redness, warmth in limb, and/or fever, flu-like symptoms, which may indicate infection. If this occurs, call your doctor right away.   If you note a sudden increase in swelling in extremity, with or without redness/warmth, go to the emergency room as soon as possible to have blood clot ruled out. 3. Compression bandaging supplies that can be laundered are the brown compression wraps and any foam applied for padding. Launder in washer/dryer with NO fabric softener, bleach, woolite. Dry on a low heat. 4. Once compression garments are ordered, compression bandaging will be continued until garments arrive for fitting. This process can take several weeks. Upper/Lower Extremity Compression: to be measured for compression garments once swelling reduced. 2/3/22: pt and daughter shown [facility] lower leg velcro garments and custom stockings. Discussed advantages of each and prices. Pt thinks she may prefer custom stocking due to lower profile and less complicated for spouse who will be assisting. Rationale: decrease pain, increase ROM, increase tissue extensibility and decrease edema  to improve the patients ability to perform ADLs and functional tasks and also to allow pt to wear normal size shoe. With   [] TE   [] TA   [x] MT   [] SC   [] other: Patient Education: [x] Review HEP    [x] MLD Patient Education Continued education in self MLD technique with bathing and skin care. [] Progressed/Changed HEP based on:   [] positioning   [] Kinesiotape   [x] Skin care   [x] wound care   [] other:   [x] Patient/caregiver in multi-layer bandaging donning principles. , Precautions. and Handout provided. Patient / caregiver re-demonstrated bandaging.  [] Yes  [x] No  Compression bandaging/garment precautions reviewed: [x] Yes  [] No     Other Objective/Functional Measures:   Affected Side: B LEs, L>R   Left (cm) Right (cm)   5th Tuberosity             Ankle             Calf             Mid Knee                 Volumetric Measurements:     Date:  Right Left % difference    1/10/22 5685.5 5916.02 4.05   2/7/22 5642.45 6073.64 7.64   2/14/22 5572.89 6013.01 7.9   2/17/22 5581.07 5973.13 7.02       Height:     Weight:      BMI:     (36 or greater: adversely affecting lymphedema)    Pain Level (0-10 scale) post treatment: 4/10      ASSESSMENT/Changes in Function:   Pt with good tolerance to bilateral LE MLB today. Rechecked limb volumes; slight reduction on left LE and slight increase on right LE, however girth measurements are down at lower legs and increased at knees so volumes not representative of progress in lower leg reduction. Encouraged pt to limit time out of bandages to <1 hour to prevent re-accumulation of fluid. Will plan to recheck volumes next visit and measure for garments. Pt says she would like to measure for custom stockings. Encouraged pt to come wrapped to next appt. Patient will continue to benefit from skilled PT services to  address functional mobility deficits, address ROM deficits, address swelling, analyze and address soft tissue restrictions, analyze compression product fit and use, instruct in home and community integration, instruct in home lymphedema management program, measure for compression products and modify and progress therapeutic interventions to attain remaining goals. [x]  See Plan of Care  []  See progress note/recertification  []  See Discharge Summary         Progress towards goals / Updated goals:  Short term goals  Time frame: 3 weeks  1.  Patient will demonstrate knowledge of signs/symptoms of infections/cellulitis and be independent in skin care to prevent cellulitis. 2.  Patient will demonstrate independence in lymphedema home program of therapeutic exercises to improve circulation and decongest lymphedema to improve ADLs.   3.  Patient will tolerate multi-layer bandages (MLB) and show measurable decrease of <10% difference in volume of L LE vs R LE, to allow ordering of home compression system (daytime, nighttime garments and pump as needed).   4. Pt will verbalize understanding of lymphedema precautions including need for compression during high risk activities such as air travel, long car rides, and heavy weightlifting.     Long term goals  Time frame: 6 weeks  1.  Pt will show improvement in Lymphedema Life Impact Scale by 10 points for improved tolerance for work and recreational activities and thus allow improvement in patient's quality of life. 2.  Patient/family/caregiver will be independent with don/doff of compression system for LUE and use in order to prevent reaccumulation of fluid at discharge. 3.  Pt will be independent in self-MLD and show stable limb volumes showing decongestion and pt. ready for transition to independent restorative phase of lymphedema therapy. PLAN  []  Upgrade activities as tolerated     [x]  Continue plan of care  []  Update interventions per flow sheet       []  Discharge due to:_  [x]  Other: assess response to addition of komprex II left calf, check volumes next visit and measure for garments? Melinda Mcgovern, PT, DPT, CLT-JÚNIOR  2/17/2022

## 2022-02-21 ENCOUNTER — HOSPITAL ENCOUNTER (OUTPATIENT)
Dept: PHYSICAL THERAPY | Age: 87
Discharge: HOME | End: 2022-02-21
Payer: MEDICARE

## 2022-02-21 PROCEDURE — 97760 ORTHOTIC MGMT&TRAING 1ST ENC: CPT

## 2022-02-21 PROCEDURE — 97140 MANUAL THERAPY 1/> REGIONS: CPT

## 2022-02-21 NOTE — PROGRESS NOTES
LYMPHEDEMA PT DAILY TREATMENT NOTE - [facility] -15    Patient Name: Lucas Beckett  Date:2022  : 1934  [x]  Patient  Verified  Payor: [place] MEDICARE / Plan: [place] MEDICARE PART A & B / Product Type: Medicare /    In time: 2:00 pm  Out time: 3:10 pm  Total Treatment Time (min): 70  Total Timed Codes (min): 70  1:1 Treatment Time ([facility] only): 70  Visit #:  8    Treatment Area: Lymphedema, not elsewhere classified [I89.0]    SUBJECTIVE  Pain Level (0-10 scale): pt denies pain   Any medication changes, allergies to medications, adverse drug reactions, diagnosis change, or new procedure performed?: [x] No    [] Yes (see summary sheet for update)  Subjective functional status/changes:   [] No changes reported  Patient arrives to appt ambulatory with rollator, accompanied by her . Pt says she tolerated bandages well. Pt arrived to appt with MLB intact as requested previous visit. OBJECTIVE     min Therapeutic Exercise:  [] Denisha Imus Exercises [x] Remedial Lymphedema Exercise Program [] Axillary Web Exercise Program      [] Shoulder ROM Exercises    Encouraged ankle pumps and LAQs while seated. Pt's daughter reports pt very sedentary during the day. Educated pt on importance of exercise and walking in reducing swelling. Rationale: Activate muscle pump to improve lymphatic fluid movement and decrease swelling to improve the patients ability to perform ADL and IADL skills and prevent worsening of swelling over time. 55  16 Min  Min  Manual Therapy  Orthotic management     Manual Lymphatic Drainage (MLD):  Area to decongest: LE: bilateral foot ankle calf mid knee   Sequence used and effectiveness: not performed today     Instructed in self-MLD for upper legs. Skin/wound care/debridement: Reviewed skin care principles:   Performed skin care with low pH lotion following manual lymph principles.    Skin care products  Hygiene  Prevention of cellulitis  Wound care     Pt presents with significant dryness LEs. Pt says healed wound on right anterior lower leg sometimes weeps at night. Applied Eucerin/anti-itch lotion mix B LEs. Applied multi-layer compression bandaging to: Patient arrived with adequate bandage in place that was applied by therapist previous visit. bilateral  lower extremity: below knee    The following multi-layer bandages were applied:  Protouch Stockinette    Padding layer:  Cast padding to bulk out ankle  Fleece    Foam:  10 cm roll   komprex II foam to dorsum of both feet  Added long komprex II strips to medial and lateral sides of left calf   gray kidney shaped foam to medial malleoli bilaterally      Short stretch bandages:   6 cm - gisel sandal technique  8 cm  10 cm     Applied Tg fix to cover entire bandages to help prevent sliding. Pt demonstrated safe ambulation using rollator and new hard bottomed slipper that daughter purchased. Pt concerned about getting up to the bathroom at night. Recommend pt sleep with hospital gripper socks on for safety (provided with 2 pairs). Compression bandaging instructions:  1. Compression bandaging will be applied twice a week by therapist in clinic, with adjustments to be made at home as indicated if bandaging becomes loose or uncomfortable. 2. If tolerated, remain bandaged between appointments with therapist, removing under the following conditionsDO NOT WAIT FOR A RETURN PHONE [facility]:  -Numbness/tingling in extremity different from what you have experienced without the bandages in place.  -Compromise in circulation, monitoring blood refill into toes after applying gentle pressure to toes.  -Onset of pain in extremity that is sudden and severe in nature. -Redness, warmth in limb, and/or fever, flu-like symptoms, which may indicate infection. If this occurs, call your doctor right away.   If you note a sudden increase in swelling in extremity, with or without redness/warmth, go to the emergency room as soon as possible to have blood clot ruled out. 3. Compression bandaging supplies that can be laundered are the brown compression wraps and any foam applied for padding. Launder in washer/dryer with NO fabric softener, bleach, woolite. Dry on a low heat. 4. Once compression garments are ordered, compression bandaging will be continued until garments arrive for fitting. This process can take several weeks. Upper/Lower Extremity Compression:   2/21/22:   Pt and spouse shown garment samples of custom stockings and velcro compression style garments. Discussed advantages of each. Pt and spouse prefer stockings due to lower profile and also less complication with multiple pieces. Pt says her swelling tends to reduce at night. Spouse says he will be able to assist with don/doffing as needed. Decision made to order CCL 1 for increased ease of donning and due to tenuous skin. Encouraged closed toe stocking due to dorsal foot swelling and also callous on plantar surface of 1st MTP which could cause irritation. Measured for the following compression products:    LE: bilateral foot ankle calf mid knee bilateral lower extremity: Knee high Top band silicone border Closed toe    Style: Flat-knit    Brand: [employer]    Type: Custom: [employer] 3021 custom knee high stockings    Vendor: [employer]    Education: to be performed at [address] demonstrated donning and doffing with to be performed at garment fitting      2/3/22: pt and daughter shown Derrek Durandudsen lower leg velcro garments and custom stockings. Discussed advantages of each and prices. Pt thinks she may prefer custom stocking due to lower profile and less complicated for spouse who will be assisting. Rationale: decrease pain, increase ROM, increase tissue extensibility and decrease edema  to improve the patients ability to perform ADLs and functional tasks and also to allow pt to wear normal size shoe.              With   [] TE   [] TA   [x] MT   [] SC   [x] other: Patient Education: [x] Review HEP    [x] MLD Patient Education Continued education in self MLD technique with bathing and skin care. [] Progressed/Changed HEP based on:   [] positioning   [] Kinesiotape   [x] Skin care   [x] wound care   [x] other: orthotic management   [x] Patient/caregiver in multi-layer bandaging donning principles. , Precautions. and Handout provided. Patient / caregiver re-demonstrated bandaging. [] Yes  [x] No  Compression bandaging/garment precautions reviewed: [x] Yes  [] No     Other Objective/Functional Measures:   Affected Side: B LEs, L>R   Left (cm) Right (cm)   5th Tuberosity             Ankle             Calf             Mid Knee                 Volumetric Measurements:     Date:  Right Left % difference    1/10/22 5685.5 5916.02 4.05   2/7/22 5642.45 6073.64 7.64   2/14/22 5572.89 6013.01 7.9   2/17/22 5581.07 5973.13 7.02   2/21/22 5352.93 5519.92 3.12       Height:     Weight:      BMI:     (36 or greater: adversely affecting lymphedema)    Pain Level (0-10 scale) post treatment: 0/10      ASSESSMENT/Changes in Function:   Pt with good tolerance to bilateral LE MLB today. Rechecked limb volumes and pt measured for garments today. Encouraged pt to limit time out of bandages to <1 hour to prevent re-accumulation of fluid. Will continue MLB 2x/week until pt receives garments. Patient will continue to benefit from skilled PT services to  address functional mobility deficits, address ROM deficits, address swelling, analyze and address soft tissue restrictions, analyze compression product fit and use, instruct in home and community integration, instruct in home lymphedema management program, measure for compression products and modify and progress therapeutic interventions to attain remaining goals.      [x]  See Plan of Care  []  See progress note/recertification  []  See Discharge Summary         Progress towards goals / Updated goals:  Short term goals  Time frame: 3 weeks  1.  Patient will demonstrate knowledge of signs/symptoms of infections/cellulitis and be independent in skin care to prevent cellulitis. MET 2/21/22  2.  Patient will demonstrate independence in lymphedema home program of therapeutic exercises to improve circulation and decongest lymphedema to improve ADLs. MET 2/21/22  3.  Patient will tolerate multi-layer bandages (MLB) and show measurable decrease of <10% difference in volume of L LE vs R LE, to allow ordering of home compression system (daytime, nighttime garments and pump as needed).  MET 2/21/22  4. Pt will verbalize understanding of lymphedema precautions including need for compression during high risk activities such as air travel, long car rides, and heavy weightlifting. MET 2/21/22     Long term goals  Time frame: 6 weeks  1.  Pt will show improvement in Lymphedema Life Impact Scale by 10 points for improved tolerance for work and recreational activities and thus allow improvement in patient's quality of life. 2.  Patient/family/caregiver will be independent with don/doff of compression system for LUE and use in order to prevent reaccumulation of fluid at discharge. 3.  Pt will be independent in self-MLD and show stable limb volumes showing decongestion and pt. ready for transition to independent restorative phase of lymphedema therapy. PLAN  []  Upgrade activities as tolerated     [x]  Continue plan of care  []  Update interventions per flow sheet       []  Discharge due to:_  [x]  Other: fit garments upon receiving        Monik Mcgovern, PT, DPT, CLT-JÚNIOR  2/21/2022

## 2022-02-24 ENCOUNTER — HOSPITAL ENCOUNTER (OUTPATIENT)
Dept: PHYSICAL THERAPY | Age: 87
Discharge: HOME | End: 2022-02-24
Payer: MEDICARE

## 2022-02-24 PROCEDURE — 97140 MANUAL THERAPY 1/> REGIONS: CPT

## 2022-02-24 NOTE — PROGRESS NOTES
LYMPHEDEMA PT DAILY TREATMENT NOTE - [facility] -15    Patient Name: Marylee Solomon  Date:2022  : 1934  [x]  Patient  Verified  Payor: [place] MEDICARE / Plan: [place] MEDICARE PART A & B / Product Type: Medicare /    In time: 10:00 am  Out time: 10:35 am  Total Treatment Time (min): 35  Total Timed Codes (min): 35  1:1 Treatment Time ( only): 35  Visit #:  9    Treatment Area: Lymphedema, not elsewhere classified [I89.0]    SUBJECTIVE  Pain Level (0-10 scale): pt denies pain   Any medication changes, allergies to medications, adverse drug reactions, diagnosis change, or new procedure performed?: [x] No    [] Yes (see summary sheet for update)  Subjective functional status/changes:   [] No changes reported  Patient arrives to appt ambulatory with rollator, accompanied by her daughter. Pt says she tolerated bandages well. Pt arrived to appt with MLB removed, says she took bandages off just prior to appt to shower at home. Pt says she made payment for garments and hopes to have them by next visit. OBJECTIVE     min Therapeutic Exercise:  [] Marialuisa Drummer Exercises [x] Remedial Lymphedema Exercise Program [] Axillary Web Exercise Program      [] Shoulder ROM Exercises    Encouraged ankle pumps and LAQs while seated. Pt's daughter reports pt very sedentary during the day. Educated pt on importance of exercise and walking in reducing swelling. Rationale: Activate muscle pump to improve lymphatic fluid movement and decrease swelling to improve the patients ability to perform ADL and IADL skills and prevent worsening of swelling over time. 28 Min    Manual Therapy    Manual Lymphatic Drainage (MLD):  Area to decongest: LE: bilateral foot ankle calf mid knee   Sequence used and effectiveness: not performed today     Instructed in self-MLD for upper legs. Skin/wound care/debridement: Reviewed skin care principles:   Performed skin care with low pH lotion following manual lymph principles.    Skin care products  Hygiene  Prevention of cellulitis  Wound care     Applied Eucerin/anti-itch lotion mix B LEs. Applied multi-layer compression bandaging to: Patient arrived without adequate bandage in place that was applied by therapist previous visit. bilateral  lower extremity: below knee    The following multi-layer bandages were applied:  Protouch Stockinette    Padding layer:  Cast padding to bulk out ankle  Fleece    Foam:  10 cm roll   komprex II foam to dorsum of both feet  Added long komprex II strips to medial and lateral sides of left calf   gray kidney shaped foam to medial malleoli bilaterally      Short stretch bandages:   6 cm - gisel sandal technique  8 cm  10 cm     Applied tubigrip over taped areas. Pt demonstrated safe ambulation using rollator and new hard bottomed slipper that daughter purchased. Pt concerned about getting up to the bathroom at night. Recommend pt sleep with hospital gripper socks on for safety (provided with 2 pairs). Compression bandaging instructions:  1. Compression bandaging will be applied twice a week by therapist in clinic, with adjustments to be made at home as indicated if bandaging becomes loose or uncomfortable. 2. If tolerated, remain bandaged between appointments with therapist, removing under the following conditionsDO NOT WAIT FOR A RETURN PHONE [facility]:  -Numbness/tingling in extremity different from what you have experienced without the bandages in place.  -Compromise in circulation, monitoring blood refill into toes after applying gentle pressure to toes.  -Onset of pain in extremity that is sudden and severe in nature. -Redness, warmth in limb, and/or fever, flu-like symptoms, which may indicate infection. If this occurs, call your doctor right away. If you note a sudden increase in swelling in extremity, with or without redness/warmth, go to the emergency room as soon as possible to have blood clot ruled out.     3. Compression bandaging supplies that can be laundered are the brown compression wraps and any foam applied for padding. Launder in washer/dryer with NO fabric softener, bleach, woolite. Dry on a low heat. 4. Once compression garments are ordered, compression bandaging will be continued until garments arrive for fitting. This process can take several weeks. Upper/Lower Extremity Compression:   2/21/22:   Pt and spouse shown garment samples of custom stockings and velcro compression style garments. Discussed advantages of each. Pt and spouse prefer stockings due to lower profile and also less complication with multiple pieces. Pt says her swelling tends to reduce at night. Spouse says he will be able to assist with don/doffing as needed. Decision made to order CCL 1 for increased ease of donning and due to tenuous skin. Encouraged closed toe stocking due to dorsal foot swelling and also callous on plantar surface of 1st MTP which could cause irritation. Measured for the following compression products:    LE: bilateral foot ankle calf mid knee bilateral lower extremity: Knee high Top band silicone border Closed toe    Style: Flat-knit    Brand: [employer]    Type: Custom: [employer] 3021 custom knee high stockings    Vendor: [employer]    Education: to be performed at [address] demonstrated donning and doffing with to be performed at garment fitting      2/3/22: pt and daughter shown [place] lower leg velcro garments and custom stockings. Discussed advantages of each and prices. Pt thinks she may prefer custom stocking due to lower profile and less complicated for spouse who will be assisting. Rationale: decrease pain, increase ROM, increase tissue extensibility and decrease edema  to improve the patients ability to perform ADLs and functional tasks and also to allow pt to wear normal size shoe.              With   [] TE   [] TA   [x] MT   [] SC   [x] other: Patient Education: [x] Review HEP    [x] MLD Patient Education Continued education in self MLD technique with bathing and skin care. [] Progressed/Changed HEP based on:   [] positioning   [] Kinesiotape   [x] Skin care   [x] wound care   [x] other: orthotic management   [x] Patient/caregiver in multi-layer bandaging donning principles. , Precautions. and Handout provided. Patient / caregiver re-demonstrated bandaging. [] Yes  [x] No  Compression bandaging/garment precautions reviewed: [x] Yes  [] No     Other Objective/Functional Measures:   Affected Side: B LEs, L>R   Left (cm) Right (cm)   5th Tuberosity             Ankle             Calf             Mid Knee                 Volumetric Measurements:     Date:  Right Left % difference    1/10/22 5685.5 5916.02 4.05   2/7/22 5642.45 6073.64 7.64   2/14/22 5572.89 6013.01 7.9   2/17/22 5581.07 5973.13 7.02   2/21/22 5352.93 5519.92 3.12       Height:     Weight:      BMI:     (36 or greater: adversely affecting lymphedema)    Pain Level (0-10 scale) post treatment: 0/10      ASSESSMENT/Changes in Function:   Pt with good tolerance to bilateral LE MLB today. Rechecked limb volumes and pt measured for garments previous visit. Encouraged pt to limit time out of bandages to <1 hour to prevent re-accumulation of fluid. Will continue MLB 2x/week until pt receives garments. Pt says spouse may come to garment fitting appt since he will be assisting pt at home. Patient will continue to benefit from skilled PT services to  address functional mobility deficits, address ROM deficits, address swelling, analyze and address soft tissue restrictions, analyze compression product fit and use, instruct in home and community integration, instruct in home lymphedema management program, measure for compression products and modify and progress therapeutic interventions to attain remaining goals.      [x]  See Plan of Care  []  See progress note/recertification  []  See Discharge Summary         Progress towards goals / Updated goals:  Short term goals  Time frame: 3 weeks  1.  Patient will demonstrate knowledge of signs/symptoms of infections/cellulitis and be independent in skin care to prevent cellulitis. MET 2/21/22  2.  Patient will demonstrate independence in lymphedema home program of therapeutic exercises to improve circulation and decongest lymphedema to improve ADLs. MET 2/21/22  3.  Patient will tolerate multi-layer bandages (MLB) and show measurable decrease of <10% difference in volume of L LE vs R LE, to allow ordering of home compression system (daytime, nighttime garments and pump as needed).  MET 2/21/22  4. Pt will verbalize understanding of lymphedema precautions including need for compression during high risk activities such as air travel, long car rides, and heavy weightlifting. MET 2/21/22     Long term goals  Time frame: 6 weeks  1.  Pt will show improvement in Lymphedema Life Impact Scale by 10 points for improved tolerance for work and recreational activities and thus allow improvement in patient's quality of life. 2.  Patient/family/caregiver will be independent with don/doff of compression system for LUE and use in order to prevent reaccumulation of fluid at discharge. 3.  Pt will be independent in self-MLD and show stable limb volumes showing decongestion and pt. ready for transition to independent restorative phase of lymphedema therapy. PLAN  []  Upgrade activities as tolerated     [x]  Continue plan of care  []  Update interventions per flow sheet       []  Discharge due to:_  [x]  Other: fit garments upon receiving        Monik Mcgovern, PT, DPT, CLT-JÚNIOR  2/24/2022

## 2022-03-01 ENCOUNTER — HOSPITAL ENCOUNTER (OUTPATIENT)
Dept: PHYSICAL THERAPY | Age: 87
Discharge: HOME | End: 2022-03-01
Payer: MEDICARE

## 2022-03-01 PROCEDURE — 97760 ORTHOTIC MGMT&TRAING 1ST ENC: CPT

## 2022-03-01 NOTE — PROGRESS NOTES
LYMPHEDEMA PT DAILY TREATMENT NOTE - [facility] -15    Patient Name: Adri White  Date:3/1/2022  : 1934  [x]  Patient  Verified  Payor: [place] MEDICARE / Plan: [place] MEDICARE PART A & B / Product Type: Medicare /    In time: 1:05 pm  Out time: 1:45 pm  Total Treatment Time (min): 40  Total Timed Codes (min): 40  1:1 Treatment Time ( only): 40  Visit #:  10    Treatment Area: Lymphedema, not elsewhere classified [I89.0]    SUBJECTIVE  Pain Level (0-10 scale): pt denies pain   Any medication changes, allergies to medications, adverse drug reactions, diagnosis change, or new procedure performed?: [x] No    [] Yes (see summary sheet for update)  Subjective functional status/changes:   [] No changes reported  Patient arrives to appt ambulatory with rollator, accompanied by her daughter and . Pt says she tolerated bandages well. Pt arrived to appt with MLB removed, says she took bandages off just prior to appt to shower at home. Pt received stockings and brought for fitting today. OBJECTIVE     min Therapeutic Exercise:  [] Héctor Filbert Exercises [x] Remedial Lymphedema Exercise Program [] Axillary Web Exercise Program      [] Shoulder ROM Exercises    Encouraged ankle pumps and LAQs while seated. Pt's daughter reports pt very sedentary during the day. Educated pt on importance of exercise and walking in reducing swelling. Rationale: Activate muscle pump to improve lymphatic fluid movement and decrease swelling to improve the patients ability to perform ADL and IADL skills and prevent worsening of swelling over time. [address] management     Manual Lymphatic Drainage (MLD):  Area to decongest: LE: bilateral foot ankle calf mid knee   Sequence used and effectiveness: not performed today     Instructed in self-MLD for upper legs. Skin/wound care/debridement: Reviewed skin care principles:   Performed skin care with low pH lotion following manual lymph principles.    Skin care products  Hygiene  Prevention of cellulitis  Wound care     Reviewed skin care routine for home. Applied multi-layer compression bandaging to:  Deferred due to garment fitting  Patient arrived without adequate bandage in place that was applied by therapist previous visit. bilateral  lower extremity: below knee    The following multi-layer bandages were applied:  Protouch Stockinette    Padding layer:  Cast padding to bulk out ankle  Fleece    Foam:  10 cm roll   komprex II foam to dorsum of both feet  Added long komprex II strips to medial and lateral sides of left calf   gray kidney shaped foam to medial malleoli bilaterally      Short stretch bandages:   6 cm - gisel sandal technique  8 cm  10 cm     Applied tubigrip over taped areas. Pt demonstrated safe ambulation using rollator and new hard bottomed slipper that daughter purchased. Pt concerned about getting up to the bathroom at night. Recommend pt sleep with hospital gripper socks on for safety (provided with 2 pairs). Compression bandaging instructions:  1. Compression bandaging will be applied twice a week by therapist in clinic, with adjustments to be made at home as indicated if bandaging becomes loose or uncomfortable. 2. If tolerated, remain bandaged between appointments with therapist, removing under the following conditionsDO NOT WAIT FOR A RETURN PHONE [facility]:  -Numbness/tingling in extremity different from what you have experienced without the bandages in place.  -Compromise in circulation, monitoring blood refill into toes after applying gentle pressure to toes.  -Onset of pain in extremity that is sudden and severe in nature. -Redness, warmth in limb, and/or fever, flu-like symptoms, which may indicate infection. If this occurs, call your doctor right away. If you note a sudden increase in swelling in extremity, with or without redness/warmth, go to the emergency room as soon as possible to have blood clot ruled out. 3. Compression bandaging supplies that can be laundered are the brown compression wraps and any foam applied for padding. Launder in washer/dryer with NO fabric softener, bleach, woolite. Dry on a low heat. 4. Once compression garments are ordered, compression bandaging will be continued until garments arrive for fitting. This process can take several weeks. Upper/Lower Extremity Compression:   2/3/22: pt and daughter shown Juzo lower leg velcro garments and custom stockings. Discussed advantages of each and prices. Pt thinks she may prefer custom stocking due to lower profile and less complicated for spouse who will be assisting.      2/21/22:   Pt and spouse shown garment samples of custom stockings and velcro compression style garments. Discussed advantages of each. Pt and spouse prefer stockings due to lower profile and also less complication with multiple pieces. Pt says her swelling tends to reduce at night. Spouse says he will be able to assist with don/doffing as needed. Decision made to order CCL 1 for increased ease of donning and due to tenuous skin. Encouraged closed toe stocking due to dorsal foot swelling and also callous on plantar surface of 1st MTP which could cause irritation. 3/1/22:   Fitted for the following compression products:    LE: bilateral foot ankle calf mid knee bilateral lower extremity: Knee high Top band silicone border Closed toe    Style: Flat-knit    Brand: Foreign    Type: Custom: C[employer]zo 3021 custom knee high stockings    Vendor: [employer]    Education: Educated patient in compression garment donning and doffing. Glove use with donning. Daily wear schedule. Daily laundering. Garment lifespan. Return and reordering process (by bringing prior garments into the clinic). Educated patient to monitor for redness or pressure points on the skin.   If new pain occurs they should contact their therapist.    Patient/family demonstrated donning and doffing with not assessed this date; therapist donned stockings and pt declined having spouse practice due to leg tenderness     Good fit noted for stockings, however pt c/o soreness when therapist donning garments. Pt with very tender skin and concerned about spouse donning stockings at home. Encouraged pt's spouse to assist with getting stocking over foot/ankle and then pt can possibly pull stockings up the rest of the way. Pt given dycem that may help as well and demonstrated use. Encouraged pt use pump in the morning if she is having difficulty donning stockings. Recommend pt wear stockings today and tomorrow before washing/drying to help \"break them in.\" Pt's daughter took a video today of therapist donning and providing instructions. Compression Garment Instructions  1. Perform your skin care and wound care, cleansing skin daily with a soap-free product, such as Dove Sensitive Skin Body Wash, and using low pH lotion, upward strokes to stimulate lymphatic vessels. If you do it in the morning, wait 20 minutes to allow lotion to absorb before applying your stockings. 2. Apply compression stockings to clean, dry legs first thing in the morning, EVERY MORNING. 3. Adjust garments FREQUENTLY throughout the day, checking for creases and folds behind the knees, at front of ankle, smoothing garment in problem areas regularly. Use rubber gloves to do this task to protect your garment from tears. 4. Launder garment nightly either by hand or washing machine on a delicate setting in a garment bag or pillowcase. Use mild detergent with NO FABRIC SOFTENER, NO BLEACH, NO WOOLITE. Wash in cool/warm water. 5. Dry garment in dryer on low heat in garment bag or pillowcase. 6. When you remove your stockings, observe your toes for any areas of redness, particularly along the sides. You may want to wear a diabetic sock over your stocking in your shoe to protect it.   7. Perform leg exercises daily when you are wearing your compression garment during the day. 8. Sleep with your legs elevated. 9. You can use your pump 1-2x/day, in the morning and evening, 30-45 min. Remove stockings under the following conditions:  -numbness/tingling in the extremity   - A compromise in circulation: feet feel cold   -onset of pain in the leg that is sudden and severe in nature  -Redness, warmth in the limb and/or fever, flu-like symptoms, which may indicate an infection. If this occurs, call your doctor right away and discontinue wearing the compression stockings. Return to the clinic next visit with your compression stockings on for reassessment of fit and effectiveness of the garments. If you find that you are unable to get your stockings on in the morning due to an increase in leg swelling, please notify your therapist as soon as possible at [phone]. Rationale: decrease pain, increase ROM, increase tissue extensibility and decrease edema  to improve the patients ability to perform ADLs and functional tasks and also to allow pt to wear normal size shoe. With   [] TE   [] TA   [] MT   [] SC   [x] other: Patient Education: [x] Review HEP    [x] MLD Patient Education Continued education in self MLD technique with bathing and skin care. [] Progressed/Changed HEP based on:   [] positioning   [] Kinesiotape   [x] Skin care   [x] wound care   [x] other: orthotic management   [x] Patient/caregiver in multi-layer bandaging donning principles. , Precautions. and Handout provided. Patient / caregiver re-demonstrated bandaging.  [] Yes  [x] No  Compression bandaging/garment precautions reviewed: [x] Yes  [] No     Other Objective/Functional Measures:   Affected Side: B LEs, L>R   Left (cm) Right (cm)   5th Tuberosity             Ankle             Calf             Mid Knee                 Volumetric Measurements:     Date:  Right Left % difference    1/10/22 5685.5 5916.02 4.05   2/7/22 5642.45 6073.64 7.64   2/14/22 5572.89 6013.01 7.9   2/17/22 5581.07 5973.13 7.02   2/21/22 5352.93 5519.92 3.12   3/1/22 5355.74 5377.98 0.42       Height:     Weight:      BMI:     (36 or greater: adversely affecting lymphedema)    Pain Level (0-10 scale) post treatment: 0/10      ASSESSMENT/Changes in Function:   Pt with fair tolerance to garment fitting; c/o pain when therapist donning garments due to tender skin. Good fit noted for stockings and pt reports they \"felt good\" once on. Pt to initiate daily wear of stockings and resume use of her pump 1-2x/day. Pt to return for 2nd visit this week to reassess fit of garments. If pt/spouse having difficulty with don/doffing, pt to consider ordering velcro garments for improved tolerance. Patient will continue to benefit from skilled PT services to  address functional mobility deficits, address ROM deficits, address swelling, analyze and address soft tissue restrictions, analyze compression product fit and use, instruct in home and community integration, instruct in home lymphedema management program, measure for compression products and modify and progress therapeutic interventions to attain remaining goals. [x]  See Plan of Care  []  See progress note/recertification  []  See Discharge Summary         Progress towards goals / Updated goals:  Short term goals  Time frame: 3 weeks  1.  Patient will demonstrate knowledge of signs/symptoms of infections/cellulitis and be independent in skin care to prevent cellulitis. MET 2/21/22  2.  Patient will demonstrate independence in lymphedema home program of therapeutic exercises to improve circulation and decongest lymphedema to improve ADLs. MET 2/21/22  3.  Patient will tolerate multi-layer bandages (MLB) and show measurable decrease of <10% difference in volume of L LE vs R LE, to allow ordering of home compression system (daytime, nighttime garments and pump as needed).  MET 2/21/22  4.  Pt will verbalize understanding of lymphedema precautions including need for compression during high risk activities such as air travel, long car rides, and heavy weightlifting. MET 2/21/22     Long term goals  Time frame: 6 weeks  1.  Pt will show improvement in Lymphedema Life Impact Scale by 10 points for improved tolerance for work and recreational activities and thus allow improvement in patient's quality of life. 2.  Patient/family/caregiver will be independent with don/doff of compression system for LUE and use in order to prevent reaccumulation of fluid at discharge. Ongoing, fit garments 3/1/22   3.  Pt will be independent in self-MLD and show stable limb volumes showing decongestion and pt. ready for transition to independent restorative phase of lymphedema therapy. PLAN  []  Upgrade activities as tolerated     [x]  Continue plan of care  []  Update interventions per flow sheet       []  Discharge due to:_  [x]  Other: reassess fit of garments and recheck limb volumes next visit. Mehrdad Mcgovern, PT, DPT, CLT-JÚNIOR  3/1/2022

## 2022-03-04 ENCOUNTER — HOSPITAL ENCOUNTER (OUTPATIENT)
Dept: PHYSICAL THERAPY | Age: 87
Discharge: HOME | End: 2022-03-04
Payer: MEDICARE

## 2022-03-04 PROCEDURE — 97140 MANUAL THERAPY 1/> REGIONS: CPT

## 2022-03-04 PROCEDURE — 97763 ORTHC/PROSTC MGMT SBSQ ENC: CPT

## 2022-03-04 NOTE — PROGRESS NOTES
LYMPHEDEMA PT DAILY TREATMENT NOTE - [facility] 2-15  Discharge note    Patient Name: Shellee Hammans  Date:3/4/2022  : 1934  [x]  Patient  Verified  Payor: [place] MEDICARE / Plan: [place] MEDICARE PART A & B / Product Type: Medicare /    In time: 2:15 pm  Out time: 3:00 pm  Total Treatment Time (min): 45  Total Timed Codes (min): 45  1:1 Treatment Time ( [address] only): 39  Visit #:  11    Treatment Area: Lymphedema, not elsewhere classified [I89.0]    SUBJECTIVE  Pain Level (0-10 scale): pt denies pain   Any medication changes, allergies to medications, adverse drug reactions, diagnosis change, or new procedure performed?: [x] No    [] Yes (see summary sheet for update)  Subjective functional status/changes:   [] No changes reported  Patient arrives to appt ambulatory with rollator, accompanied by her daughter. Pt arrives to appt wearing new custom stockings. Pt says spouse assists with don/doffing, but says it is difficult getting stocking over the heels. Pt asking about getting a new [employer] pump since her current pump is broken and needs to be replaced. OBJECTIVE     min Therapeutic Exercise:  [] [place] Exercises [x] Remedial Lymphedema Exercise Program [] Axillary Web Exercise Program      [] Shoulder ROM Exercises    Encouraged ankle pumps and LAQs while seated. Pt's daughter reports pt very sedentary during the day. Educated pt on importance of exercise and walking in reducing swelling. Rationale: Activate muscle pump to improve lymphatic fluid movement and decrease swelling to improve the patients ability to perform ADL and IADL skills and prevent worsening of swelling over time. 15  31 Min  Min    Manual therapy   Orthotic management     Manual Lymphatic Drainage (MLD):  Area to decongest: LE: bilateral foot ankle calf mid knee   Sequence used and effectiveness: not performed today     Instructed in self-MLD for upper legs.     Skin/wound care/debridement: Reviewed skin care principles:   Performed skin care with low pH lotion following manual lymph principles. Skin care products  Hygiene  Prevention of cellulitis  Wound care     Reviewed skin care routine for home. Applied multi-layer compression bandaging to:  Deferred due to garment fitting       Upper/Lower Extremity Compression:   2/3/22: pt and daughter shown Juzo lower leg velcro garments and custom stockings. Discussed advantages of each and prices. Pt thinks she may prefer custom stocking due to lower profile and less complicated for spouse who will be assisting.      2/21/22:   Pt and spouse shown garment samples of custom stockings and velcro compression style garments. Discussed advantages of each. Pt and spouse prefer stockings due to lower profile and also less complication with multiple pieces. Pt says her swelling tends to reduce at night. Spouse says he will be able to assist with don/doffing as needed. Decision made to order CCL 1 for increased ease of donning and due to tenuous skin. Encouraged closed toe stocking due to dorsal foot swelling and also callous on plantar surface of 1st MTP which could cause irritation. 3/1/22:   Fitted for the following compression products:    LE: bilateral foot ankle calf mid knee bilateral lower extremity: Knee high Top band silicone border Closed toe    Style: Flat-knit    Brand: [employer]    Type: Custom: [address] 3021 custom knee high stockings    Vendor: [employer]    Education: Educated patient in compression garment donning and doffing. Glove use with donning. Daily wear schedule. Daily laundering. Garment lifespan. Return and reordering process (by bringing prior garments into the clinic). Educated patient to monitor for redness or pressure points on the skin.   If new pain occurs they should contact their therapist.    Patient/family demonstrated donning and doffing with not assessed this date; therapist donned stockings and pt declined having spouse practice due to leg tenderness     Good fit noted for stockings, however pt c/o soreness when therapist donning garments. Pt with very tender skin and concerned about spouse donning stockings at home. Encouraged pt's spouse to assist with getting stocking over foot/ankle and then pt can possibly pull stockings up the rest of the way. Pt given dycem that may help as well and demonstrated use. Encouraged pt use pump in the morning if she is having difficulty donning stockings. Recommend pt wear stockings today and tomorrow before washing/drying to help \"break them in.\" Pt's daughter took a video today of therapist donning and providing instructions. Compression Garment Instructions  1. Perform your skin care and wound care, cleansing skin daily with a soap-free product, such as Dove Sensitive Skin Body Wash, and using low pH lotion, upward strokes to stimulate lymphatic vessels. If you do it in the morning, wait 20 minutes to allow lotion to absorb before applying your stockings. 2. Apply compression stockings to clean, dry legs first thing in the morning, EVERY MORNING. 3. Adjust garments FREQUENTLY throughout the day, checking for creases and folds behind the knees, at front of ankle, smoothing garment in problem areas regularly. Use rubber gloves to do this task to protect your garment from tears. 4. Launder garment nightly either by hand or washing machine on a delicate setting in a garment bag or pillowcase. Use mild detergent with NO FABRIC SOFTENER, NO BLEACH, NO WOOLITE. Wash in cool/warm water. 5. Dry garment in dryer on low heat in garment bag or pillowcase. 6. When you remove your stockings, observe your toes for any areas of redness, particularly along the sides. You may want to wear a diabetic sock over your stocking in your shoe to protect it. 7. Perform leg exercises daily when you are wearing your compression garment during the day. 8. Sleep with your legs elevated.     9. You can use your pump 1-2x/day, in the morning and evening, 30-45 min. Remove stockings under the following conditions:  -numbness/tingling in the extremity   - A compromise in circulation: feet feel cold   -onset of pain in the leg that is sudden and severe in nature  -Redness, warmth in the limb and/or fever, flu-like symptoms, which may indicate an infection. If this occurs, call your doctor right away and discontinue wearing the compression stockings. Return to the clinic next visit with your compression stockings on for reassessment of fit and effectiveness of the garments. If you find that you are unable to get your stockings on in the morning due to an increase in leg swelling, please notify your therapist as soon as possible at [phone]. 3/4/22: reviewed donning techniques. Pt concerned about getting stockings on/off if her spouse is not home. Daughter says they may have to hire caregiver if spouse is out of town. Pt shown [employer] and practiced today. Pt does not think she will be able to use this independently, but says it may help  with getting it over the foot. Pt requests to order donning lacey; order sent to Mary Benitez at [facility]. Rationale: decrease pain, increase ROM, increase tissue extensibility and decrease edema  to improve the patients ability to perform ADLs and functional tasks and also to allow pt to wear normal size shoe. With   [] TE   [] TA   [x] MT   [] SC   [x] other: Patient Education: [x] Review HEP    [x] MLD Patient Education Continued education in self MLD technique with bathing and skin care. [] Progressed/Changed HEP based on:   [] positioning   [] Kinesiotape   [x] Skin care   [x] wound care   [x] other: orthotic management   [x] Patient/caregiver in multi-layer bandaging donning principles. , Precautions. and Handout provided. Patient / caregiver re-demonstrated bandaging.  [] Yes  [x] No  Compression bandaging/garment precautions reviewed: [x] Yes [] No     Other Objective/Functional Measures:   Affected Side: B LEs, L>R   Left (cm) Right (cm)   5th Tuberosity             Ankle             Calf             Mid Knee                 Volumetric Measurements:     Date:  Right Left % difference    1/10/22 5685.5 5916.02 4.05   2/7/22 5642.45 6073.64 7.64   2/14/22 5572.89 6013.01 7.9   2/17/22 5581.07 5973.13 7.02   2/21/22 5352.93 5519.92 3.12   3/1/22 5355.74 5377.98 0.42   3/4/22 5406.32 5155.92 -4.63       Height:     Weight:      BMI:     (36 or greater: adversely affecting lymphedema)    Pain Level (0-10 scale) post treatment: 0/10      ASSESSMENT/Changes in Function:   Patient's left LE volume reduced since last visit and right LE volume is stable. Further instruction in don/doffing provided today. Pt requests to order Medi donning rahman. Pt says she received call from [employer] rep and they want to schedule appt to come to house. Encouraged pt to have rep come to see about getting longer leg sleeves and possibly fixing broken piece on machine. Pt to return in 6 months for re-evaluation and ordering of replacement garments or sooner as needed. Pt discharged to phase II CDT today. []  See Plan of Care  []  See progress note/recertification  [x]  See Discharge Summary         Progress towards goals / Updated goals:  Short term goals  Time frame: 3 weeks  1.  Patient will demonstrate knowledge of signs/symptoms of infections/cellulitis and be independent in skin care to prevent cellulitis. MET 2/21/22  2.  Patient will demonstrate independence in lymphedema home program of therapeutic exercises to improve circulation and decongest lymphedema to improve ADLs. MET 2/21/22  3.  Patient will tolerate multi-layer bandages (MLB) and show measurable decrease of <10% difference in volume of L LE vs R LE, to allow ordering of home compression system (daytime, nighttime garments and pump as needed).  MET 2/21/22  4.  Pt will verbalize understanding of lymphedema precautions including need for compression during high risk activities such as air travel, long car rides, and heavy weightlifting. MET 2/21/22     Long term goals  Time frame: 6 weeks  1.  Pt will show improvement in Lymphedema Life Impact Scale by 10 points for improved tolerance for work and recreational activities and thus allow improvement in patient's quality of life. MET 3/4/22  2.  Patient/family/caregiver will be independent with don/doff of compression system for LUE and use in order to prevent reaccumulation of fluid at discharge. Ongoing, fit garments 3/1/22; MET with assist of caregiver 3/4/22  3.  Pt will be independent in self-MLD and show stable limb volumes showing decongestion and pt. ready for transition to independent restorative phase of lymphedema therapy. MET 3/4/22    PLAN  []  Upgrade activities as tolerated     []  Continue plan of care  []  Update interventions per flow sheet       [x]  Discharge due to: goals met   []  Other:     Adin Campbell.  Shaniqua, PT, DPT, CLT-JÚNIOR  3/4/2022

## 2022-03-19 PROBLEM — E66.01 SEVERE OBESITY (HCC): Status: ACTIVE | Noted: 2020-03-19

## 2022-05-04 NOTE — PROGRESS NOTES
Sera Campbell (: 1934) is a 80 y.o. female, established patient, here for evaluation of the following chief complaint(s):  Follow-up       ASSESSMENT/PLAN:  Below is the assessment and plan developed based on review of pertinent history, physical exam, labs, studies, and medications. 1. Essential hypertension  -     CBC W/O DIFF; Future  -     METABOLIC PANEL, COMPREHENSIVE; Future  -     LIPID PANEL; Future  BP is at goal. I do not recommend any change in medications (Verapamil, Labetalol). 2. Acquired hypothyroidism  -     TSH 3RD GENERATION; Future  -     T4, FREE; Future  Thyroid presumed stable. Rechecking labs to confirm. Pt tolerating medication. I do not recommend a change in treatment plan (Synthroid). 3. Recurrent UTI  Followed by Dr. Baldomero Villavicencio, stable but not at goal. Continue with ongoing regimen of Myrbetriq and estrace. 4. Lymphedema  Stable, continue wearing leg stockings and pumping legs. 5. Neck pain  I recommended that she undergo PT @ her independent living facility ([address] [place] [address]). SUBJECTIVE/OBJECTIVE:  HPI    Arthralgias: Pt c/o aches and pains all over, which she does not take medication for. She uses a walker at all times. Pt states that her neck pain is still present, for which she plans to go to PT for. Urinary frequency: Pt reports urinating frequently throughout the night. Lymphedema: Pt notes improvement in her swelling since wearing stockings. Hypertension ROS: taking medications as instructed, no medication side effects noted, no TIA's, no chest pain on exertion, no dyspnea on exertion, no swelling of ankles. BP in office today is 115/74. H/M: Pt denies reading as of late, but has been playing electronic games. She states that she gets less social interaction than she'd like as she is embarrassed about her walker. Pt expresses frustration as she has difficulty leaving the house due to bodily pain, but her  does not.      Review of Systems Genitourinary: Positive for frequency. Musculoskeletal: Positive for arthralgias and neck pain. All other systems reviewed and are negative. Physical Exam  Constitutional:       Appearance: Normal appearance. HENT:      Right Ear: Tympanic membrane and external ear normal.      Left Ear: Tympanic membrane and external ear normal.      Mouth/Throat:      Mouth: Mucous membranes are moist.      Pharynx: Oropharynx is clear. Cardiovascular:      Rate and Rhythm: Normal rate and regular rhythm. Pulses: Normal pulses. Heart sounds: Normal heart sounds. Pulmonary:      Effort: Pulmonary effort is normal.      Breath sounds: Normal breath sounds. Musculoskeletal:         General: Swelling present. Normal range of motion. Comments: Lymphedema wrapped in both legs   Skin:     General: Skin is warm and dry. Neurological:      General: No focal deficit present. Mental Status: She is alert and oriented to person, place, and time. Psychiatric:         Mood and Affect: Mood normal.         Behavior: Behavior normal.       On this date 05/05/2022 I have spent 30 minutes reviewing previous notes, test results and face to face with the patient discussing the diagnosis and importance of compliance with the treatment plan as well as documenting on the day of the visit. An electronic signature was used to authenticate this note. Written by Muna Lincoln as dictated by Dr. Rosa De Jesus.    -- Muna Lincoln

## 2022-05-05 ENCOUNTER — OFFICE VISIT (OUTPATIENT)
Dept: INTERNAL MEDICINE CLINIC | Age: 87
End: 2022-05-05
Payer: MEDICARE

## 2022-05-05 VITALS
HEART RATE: 54 BPM | RESPIRATION RATE: 16 BRPM | WEIGHT: 188.2 LBS | SYSTOLIC BLOOD PRESSURE: 115 MMHG | BODY MASS INDEX: 34.63 KG/M2 | TEMPERATURE: 98.6 F | DIASTOLIC BLOOD PRESSURE: 74 MMHG | OXYGEN SATURATION: 96 % | HEIGHT: 62 IN

## 2022-05-05 DIAGNOSIS — N39.0 RECURRENT UTI: ICD-10-CM

## 2022-05-05 DIAGNOSIS — E03.9 ACQUIRED HYPOTHYROIDISM: ICD-10-CM

## 2022-05-05 DIAGNOSIS — M54.2 NECK PAIN: ICD-10-CM

## 2022-05-05 DIAGNOSIS — I10 ESSENTIAL HYPERTENSION: Primary | ICD-10-CM

## 2022-05-05 DIAGNOSIS — I89.0 LYMPHEDEMA: ICD-10-CM

## 2022-05-05 PROCEDURE — 99214 OFFICE O/P EST MOD 30 MIN: CPT | Performed by: INTERNAL MEDICINE

## 2022-05-05 PROCEDURE — 1101F PT FALLS ASSESS-DOCD LE1/YR: CPT | Performed by: INTERNAL MEDICINE

## 2022-05-05 PROCEDURE — G8536 NO DOC ELDER MAL SCRN: HCPCS | Performed by: INTERNAL MEDICINE

## 2022-05-05 PROCEDURE — G8427 DOCREV CUR MEDS BY ELIG CLIN: HCPCS | Performed by: INTERNAL MEDICINE

## 2022-05-05 PROCEDURE — G8510 SCR DEP NEG, NO PLAN REQD: HCPCS | Performed by: INTERNAL MEDICINE

## 2022-05-05 PROCEDURE — 1090F PRES/ABSN URINE INCON ASSESS: CPT | Performed by: INTERNAL MEDICINE

## 2022-05-05 PROCEDURE — G0463 HOSPITAL OUTPT CLINIC VISIT: HCPCS | Performed by: INTERNAL MEDICINE

## 2022-05-05 PROCEDURE — G8417 CALC BMI ABV UP PARAM F/U: HCPCS | Performed by: INTERNAL MEDICINE

## 2022-05-06 LAB
ALBUMIN SERPL-MCNC: 4 G/DL (ref 3.5–5)
ALBUMIN/GLOB SERPL: 1.4 {RATIO} (ref 1.1–2.2)
ALP SERPL-CCNC: 69 U/L (ref 45–117)
ALT SERPL-CCNC: 15 U/L (ref 12–78)
ANION GAP SERPL CALC-SCNC: 7 MMOL/L (ref 5–15)
AST SERPL-CCNC: 17 U/L (ref 15–37)
BILIRUB SERPL-MCNC: 0.6 MG/DL (ref 0.2–1)
BUN SERPL-MCNC: 35 MG/DL (ref 6–20)
BUN/CREAT SERPL: 22 (ref 12–20)
CALCIUM SERPL-MCNC: 9.9 MG/DL (ref 8.5–10.1)
CHLORIDE SERPL-SCNC: 109 MMOL/L (ref 97–108)
CHOLEST SERPL-MCNC: 134 MG/DL
CO2 SERPL-SCNC: 26 MMOL/L (ref 21–32)
CREAT SERPL-MCNC: 1.59 MG/DL (ref 0.55–1.02)
ERYTHROCYTE [DISTWIDTH] IN BLOOD BY AUTOMATED COUNT: 14.8 % (ref 11.5–14.5)
GLOBULIN SER CALC-MCNC: 2.9 G/DL (ref 2–4)
GLUCOSE SERPL-MCNC: 94 MG/DL (ref 65–100)
HCT VFR BLD AUTO: 40.9 % (ref 35–47)
HDLC SERPL-MCNC: 54 MG/DL
HDLC SERPL: 2.5 {RATIO} (ref 0–5)
HGB BLD-MCNC: 12.7 G/DL (ref 11.5–16)
LDLC SERPL CALC-MCNC: 64.8 MG/DL (ref 0–100)
MCH RBC QN AUTO: 31.1 PG (ref 26–34)
MCHC RBC AUTO-ENTMCNC: 31.1 G/DL (ref 30–36.5)
MCV RBC AUTO: 100.2 FL (ref 80–99)
NRBC # BLD: 0 K/UL (ref 0–0.01)
NRBC BLD-RTO: 0 PER 100 WBC
PLATELET # BLD AUTO: 233 K/UL (ref 150–400)
PMV BLD AUTO: 10.6 FL (ref 8.9–12.9)
POTASSIUM SERPL-SCNC: 4.4 MMOL/L (ref 3.5–5.1)
PROT SERPL-MCNC: 6.9 G/DL (ref 6.4–8.2)
RBC # BLD AUTO: 4.08 M/UL (ref 3.8–5.2)
SODIUM SERPL-SCNC: 142 MMOL/L (ref 136–145)
T4 FREE SERPL-MCNC: 1.3 NG/DL (ref 0.8–1.5)
TRIGL SERPL-MCNC: 76 MG/DL (ref ?–150)
TSH SERPL DL<=0.05 MIU/L-ACNC: 1.76 UIU/ML (ref 0.36–3.74)
VLDLC SERPL CALC-MCNC: 15.2 MG/DL
WBC # BLD AUTO: 7.2 K/UL (ref 3.6–11)

## 2022-05-16 DIAGNOSIS — I10 ESSENTIAL HYPERTENSION: ICD-10-CM

## 2022-05-16 RX ORDER — VERAPAMIL HYDROCHLORIDE 240 MG/1
TABLET, FILM COATED, EXTENDED RELEASE ORAL
Qty: 90 TABLET | Refills: 0 | Status: SHIPPED | OUTPATIENT
Start: 2022-05-16 | End: 2022-08-03

## 2022-07-30 DIAGNOSIS — I10 ESSENTIAL HYPERTENSION: ICD-10-CM

## 2022-07-31 RX ORDER — LABETALOL 200 MG/1
TABLET, FILM COATED ORAL
Qty: 180 TABLET | Refills: 0 | Status: SHIPPED | OUTPATIENT
Start: 2022-07-31 | End: 2022-08-10

## 2022-08-03 DIAGNOSIS — I10 ESSENTIAL HYPERTENSION: ICD-10-CM

## 2022-08-03 RX ORDER — VERAPAMIL HYDROCHLORIDE 240 MG/1
TABLET, FILM COATED, EXTENDED RELEASE ORAL
Qty: 90 TABLET | Refills: 0 | Status: SHIPPED | OUTPATIENT
Start: 2022-08-03 | End: 2022-08-10

## 2022-08-10 ENCOUNTER — TELEPHONE (OUTPATIENT)
Dept: INTERNAL MEDICINE CLINIC | Age: 87
End: 2022-08-10

## 2022-08-10 DIAGNOSIS — I10 ESSENTIAL HYPERTENSION: ICD-10-CM

## 2022-08-10 RX ORDER — LABETALOL 200 MG/1
TABLET, FILM COATED ORAL
Qty: 180 TABLET | Refills: 0 | Status: SHIPPED | OUTPATIENT
Start: 2022-08-10

## 2022-08-10 RX ORDER — VERAPAMIL HYDROCHLORIDE 240 MG/1
TABLET, FILM COATED, EXTENDED RELEASE ORAL
Qty: 90 TABLET | Refills: 0 | Status: SHIPPED | OUTPATIENT
Start: 2022-08-10 | End: 2022-08-17

## 2022-08-17 DIAGNOSIS — I10 ESSENTIAL HYPERTENSION: ICD-10-CM

## 2022-08-17 RX ORDER — VERAPAMIL HYDROCHLORIDE 240 MG/1
TABLET, FILM COATED, EXTENDED RELEASE ORAL
Qty: 90 TABLET | Refills: 0 | Status: SHIPPED | OUTPATIENT
Start: 2022-08-17

## 2022-09-08 ENCOUNTER — HOSPITAL ENCOUNTER (OUTPATIENT)
Dept: PHYSICAL THERAPY | Age: 87
Discharge: HOME | End: 2022-09-08
Payer: MEDICARE

## 2022-09-08 PROCEDURE — 97161 PT EVAL LOW COMPLEX 20 MIN: CPT

## 2022-09-08 PROCEDURE — 97140 MANUAL THERAPY 1/> REGIONS: CPT

## 2022-09-08 PROCEDURE — 97760 ORTHOTIC MGMT&TRAING 1ST ENC: CPT

## 2022-09-08 NOTE — PROGRESS NOTES
[facility]  Lymphedema Clinic and Cancer Rehabilitation  [address] [place] [address].  [address]  [address]      INITIAL EVALUATION    NAME: Mery Lopez AGE: 80 y.o. GENDER: female  DATE: 9/8/2022  REFERRING PHYSICIAN: Umesh Alvarez MD (PCP) / Jose Tinoco MD (referring)     HISTORY AND BACKGROUND:  Pt is an 79 yo female returning for 6 month re-evaluation of bilateral LE secondary lymphedema. [facility], using rollator for ambulation, accompanied by spouse and daughter. Pt says she just received new Lymphapress pump a few days ago and says pump now covers entire legs. Pt says she continues to wear stockings every day, spouse assists with don/doffing since pt is unable to do so independently. No other changes to medical history reported since last visit. From prior evaluation:   Patient reports 2-3 mo exacerbation of b/l mainly below knee LE swelling with recent infection. Does report swelling that goes into the knees and causes pain and limits walking. Reports 20+ year h/o swelling and has used knee high compression stockings and Vasopneumatic pump for management. States \"its so hard to use those stockings\" and gave them up. Continues to utilize compression pump 2x/day for 45min each. States pump sleeve only goes just above her knees. PMHx: R LE Mohs procedure for Melanoma.  Reports no regional lymph node removal.   Primary Diagnosis:  B/L LE lymphedema, secondary (I89.0)    Other Treatment Diagnoses:  Abnormality of gait ( other abnormalities of gait and mobility R26.89)  Swelling not relieved by elevation ( R60.9 edema)  Venous insufficiency I87.2    Date of Onset: 20 + year h/o swelling with exacerbation 11/2021  Present Symptoms and Functional Limitations: swelling that goes from toes to upper thigh and has Limited her in wearing shoes due to size of feet, decreased walking ability due to swelling, open wounds at times with leaking from her legs - improved with transition to garment use. Lymphedema Life Impact Scale:  23/68, 34% impairment     Past Medical History:   Past Medical History:   Diagnosis Date    Cataract     bilateral eyes    Frequent urinary tract infections     H/O: hysterectomy     Hypertension     Ill-defined condition 01/10/2022    Patient reports latex allergy-skin rash    Melanoma ([facility] [address]     leg    Thyroid disease      Past Surgical History:   Procedure Laterality Date    HX HERNIA REPAIR      x2    HX ORTHOPAEDIC      bilateral TKR    HX ORTHOPAEDIC      left arm fracture repair    HX OTHER SURGICAL      MOES surgery    IR CHOLECYSTOSTOMY PERCUTANEOUS       Current Medications:    Current Outpatient Medications   Medication Sig    verapamil ER (CALAN-SR) 240 mg CR tablet TAKE 1 TABLET BY MOUTH ONCE DAILY AT  NIGHT    labetaloL (NORMODYNE) 200 mg tablet Take 1 tablet by mouth twice daily    levothyroxine (SYNTHROID) 75 mcg tablet TAKE 1 TABLET BY MOUTH ONCE DAILY BEFORE BREAKFAST    B.infantis-B.ani-B.long-B.bifi (PROBIOTIC 4X) 10-15 mg TbEC Take  by mouth daily. famotidine (PEPCID) 20 mg tablet Take 20 mg by mouth two (2) times a day. mirabegron ER (MYRBETRIQ) 25 mg ER tablet Take 50 mg by mouth daily. aluminum-magnesium hydroxide 200-200 mg/5 mL susp 5 mL, diphenhydrAMINE 12.5 mg/5 mL liqd 5 mL, lidocaine 2 % soln 5 mL 5 mL by Swish and Spit route two (2) days a week. Magic mouth wash   Maalox  Lidocaine 2% viscous   Diphenhydramine oral solution     Pharmacy to mix equal portions of ingredients to a total volume as indicated in the dispense amount. cranberry fruit concentrate (AZO CRANBERRY PO) Take  by mouth.    calcium carbonate 500 mg calcium (1,250 mg) tab 500 mg, ergocalciferol (vitamin d2) 400 unit tab 200 Units Take 2 Doses by mouth daily. multivitamin, tx-iron-ca-min (THERA-M W/ IRON) 9 mg iron-400 mcg tab tablet Take 1 Tab by mouth daily.     estradiol (ESTRACE) 0.01 % (0.1 mg/gram) vaginal cream Insert 2 g into vagina daily. No current facility-administered medications for this encounter. Allergies: Allergies   Allergen Reactions    Adhesive Rash    Sulfa (Sulfonamide Antibiotics) Rash      Prior Level of Function/Social/Work History/Personal factors and/or comorbidities impacting plan of care:    Living Situation: one story home with spouse   Trainable Caregiver?: Spouse, daughter, self   Self-care/ADLs: Indep upper, needs assistance with shoes/socks/foot/leg care   Mobility: Mod I using rollator    Sleeping Arrangement:  Bed   Adaptive Equipment Owned: Rollator   Previous Therapy:  Lymphedema clinic Jan - March 2021. Compression/Lymphedema Equipment:  [employer] 3021 custom knee high stockings, 1 pair about 6 months old. Lymphapress pump. SUBJECTIVE:  Pt says she has worn stockings every day but once since last seen in March. Pt brought Medi rahman today and asked for therapist to review how to use it. Patients goals for therapy: \"check and see how my legs are doing\"       OBJECTIVE DATA SUMMARY:   EXAMINATION/PRESENTATION/DECISION MAKING:   Pain: pt denies pain           Self-Care and ADLs:  Grooming: Independent  Bathing: Modified independent    UB Dressing: Independent  LB Dressing: Modified Independent   Don/Doff Shoes/Socks:  Total assistance  Toileting: Modified independent      Skin and Tissue Assessment:  Dermal Status:  [x]   Intact [x]  Dry   []  Tenuous [x] Flaky   []  Wound/lesion present [x]  Scars:  R ant shin (MOES) B/L TKR scars   []  Dermatitis    Texture/Consistency:  [x]  Boggy []  Pitting Edema   [x]  Brawny [x]  Combination   []  Fibrotic/Woody    Pigmentation/Color Change:  []  Normal []  Hemosiderin   []  Red []  Erythematous   [x]  Hyperpigmented []  Hyperlipodermatosclerosis   Anomalies:  []  Lymphorrhea []  Vesicles   []  Petechiae []  Warty Vercusis   []  Bullae []  Papilloma   Circulatory:  []  WESTLEY []  Varicosities:   [] Pulses DP / PT []  Vascular studies ruled out DVT in past   []  DVT History    Nails:  []   Normal  [x]   Fungus  Stemmers Sign: Positive B/L LE  Height:   5'1\"  Weight:   182 lbs (was 191 lbs in Jan 2022)  BMI:   34.4  (36 or greater: adversely affecting lymphedema)    Volumetric Measurements:   Date:  Right Left % difference    9/8/22 5208.8 4915.12 -5.64   3/4/22 5406.32 5155.92 -4.63   3/1/22 5355.74 5377.98 0.42   1/10/22 5685.5 5916.02 4.05     Range of Motion: WFL  Strength: 3+/5  Sensation:  Intact to LT  Mobility:  Bed/Chair Mobility:  Modified independent  Transfers:  Modified independent    Sitting Balance:  Good, intact no support Standing Balance:  Fair   Gait:  Modified independent  Wheelchair Mobility:  NT   Endurance:  NT Stairs:  NT     Safety:  Patient is alert and oriented: A+O x 4   Safety awareness:  yes   Fall Risk?:  Yes using rollator currently. Reports no falls in last year. Patient given written fall prevention handout: Yes   Precautions:  Standard lymphedema precautions to include avoiding blood pressure readings, injections and IVs or other procedures/acts that could lead to broken skin on affected area, and avoiding excessive heat, resistive activity or altitude without compression garment    Physical Therapy Evaluation Charge Determination   History Examination Presentation Decision-Making   MEDIUM  Complexity : 1-2 comorbidities / personal factors will impact the outcome/ POC  LOW Complexity : 1-2 Standardized tests and measures addressing body structure, function, activity limitation and / or participation in recreation  LOW Complexity : Stable, uncomplicated  Other outcome measures LLIS  MEDIUM      Based on the above components, the patient evaluation is determined to be of the following complexity level: LOW     Evaluation Time: 1:05 - 1:25 pm (20 minutes)    TREATMENT PROVIDED:   1.   Treatment description:  Manual therapy x 2  The patient was re-educated regarding the role and function of the lymphatic system, and instructed in the lymphedema management protocol of complete decongestive therapy (CDT). This includes skin care to prevent breakdown or infection, lymphedema exercises, custom compression therapy options (bandaging, compression garments, compression pump, [employer]ges, JoViPak, The Anish-Santy, etc. as needed), and decongestion with manual lymphatic drainage as indicated. Reviewed limb volumes as above. Pt presents today with reduced limb volumes bilaterally. Pt measured for replacement [employer] 3021 custom knee high stockings, closed toe. Order sent to [facility] Compression. Pt to fit garments on her own and call with any issues. Treatment time:  1:25 - 1:50 pm  Minutes: 25 minutes    2. Treatment description:  Orthotic management x 1  Pt brought her Medi rahman from home and requested therapist show her again how to use it. Therapist demonstrated use and had pt practice. Pt still requires mod assist for proper use, has difficulty bending forward to reach rahman. Daughter and spouse present for instruction as well. Pt concerned about being able to don/doff stockings independently if  goes out of town. Daughter says pt would have help from family member or caregiver in this instance. Pt also shown Medi doffing stick today and instructed in use. Treatment time:  1:50 - 2:05 pm  Minutes: 15 minutes    ASSESSMENT:   Janice Matthews is a 80 y.o. female seen today for 6 month re-evaluation of stage 2 secondary lymphedema with contributing CVI. Patient reports 20+ year h/o episodic swelling. Patient reports h/o multiple infections most recent 11/2021. Patient reports h/o lymphorrhea intermittently and poor wound healing after Mohs surgery R anterior tibia.   Patient with skin changes associated with chronic swelling such as fibrosis, papillomas, warty overgrowths, dryness, taut skin with poor mobility and turgor, though these are all improved since transitioning to independent home management of lymphedema. Patient is at risk for future skin infections and wounds due to condition. Patients limb volumes have reduced bilaterally since transition to custom stockings in March 2022. Pt measured today for replacement garments since hers are 10 months old and worn. Pt to fit garments on her own and return to clinic with any issues, otherwise recommend 6 month re-evaluation. This care is medically necessary due to the infection risk with lymphedema, and to improve functional activities. CDT is necessary to resolve swelling to allow patient to return to wearing normal clothes/footwear, and prevent worsening of symptoms, such as venous stasis ulcerations, infections, or hospitalizations. Patient will be independent with home program strategies to allow improved ADL ability and mobility and to allow patient to return to greatest functional independence. Rehabilitation potential is considered to be Good. Factors which may influence rehabilitation potential include caregiver assistance, transportation, financial.  Patient will benefit from 2-4 physical therapy visits over 6-8 weeks to optimize improvement in these areas. PLAN OF CARE:   Recommendations and Planned Interventions:  Compression garment fitting/provision  Self-care training  Functional mobility training  Education in skin care and lymphedema precautions  Caregiver education as needed     GOALS  Long term goals  Time frame: 8 weeks  1. Patient/family/caregiver will be independent with don/doff of compression system for use in order to prevent reaccumulation of fluid at discharge. 2.  Pt will be independent in self-MLD and show stable limb volumes showing decongestion and pt. ready for transition to independent restorative phase of lymphedema therapy. Patient has participated in goal setting and agrees to work toward plan of care. Patient was instructed to call if questions or concerns arise.     Thank you for this referral.  Monika Ortiz. Shaniqua, PT, DPT, CLT-JÚNIOR    Time Calculation: 60 mins    TREATMENT PLAN EFFECTIVE DATES:   9/8/2022 TO 12/1/2022  I have read the above plan of care for [facility]. I certify the above prescribed services are required by this patient and are medically necessary.   The above plan of care has been developed in conjunction with the lymphedema/physical therapist.       Physician Signature: ____________________________________Date:______________

## 2022-09-08 NOTE — PROGRESS NOTES
Statement of Medical Necessity  Page 1 of 2      Winsome Nap 1934 Today's Date: 9/8/2022 MARTINA: [employer]   Payor: [place] / Plan: [place] MEDICARE PART A & B / Product Type: Medicare /  ME: TBD  Refills: 2               Diagnosis  []   I97.2 Post-Mastectomy Lymphedema [x]   I87.2 Venous Insufficiency   [x]   I89.0 Lymphedema, other secondary  []   I83.019 Venous Stasis Ulcer LE, Right   []   I89.9 Unspecified Lymphatic Disorder []   I83.029 Venous Stasis Ulcer LE, Left   [x]   R60.9 Swelling not relieved by elevation []   Q82.0 Hereditary/ Congenital Lymphedema   []   C50.211 Malignant neoplasm of breast, Right []   G89.3  Cancer associated pain   []   C50.212 Malignant neoplasm of breast, Left []   L03.115 LE Cellulitis, Right   []    []   L03.116 LE Cellulitis, Left                                                           Winsome Nap    1934  Page 2 of 2    Physician Order for DME for Diagnosis of Secondary Lymphedema as Listed on Statement of Medical Necessity, Page 1        Recommended Product:  Units Upper Extremity Rt Lt Units Lower Extremity Rt Lt    Circ-Aid, Ready Wrap, Sigvaris Arm    Inelastic binders (Circ-Aid, Farrow)  []Foot   []Below Knee   []Knee   []Thigh      Circ-Aid Ready Wrap, Sigvaris hand    Jassi Noah, night use []Full Leg  []Lower Leg      Tribute Arm, night use    Tribute, night use  []Full Leg  []Lower Leg      Jassi Noah Arm, night use    Jordan Sleeve Leg/ Foot, night use      Gradiant Compression Sleeves & Gloves  []Custom [] RM Arm:  []CCL1 []CCL2 []CCL3  []Custom [] RM Glove: []CCL1 []CCL2 []CCL3     4 Gradient Compression Stockings   [x]Custom  []RM Lower Extremity:   [x]CCL1       [x]CCL2         []CCL3   [x]Knee       []Thigh        []Waist Length x x    Jordan sleeve arm w/ hand, night use    Tribute Wrap, night use      Compression Bra          Compression Vest         The above patient was referred for treatment of Lymphedema due to the diagnosis indicated above.   Lymphedema is a progressive and chronic condition. It may cause acute infections, muscle atrophy, discomfort, and connective tissue fibrosis with irreversible tissue damage, decreased ROM in the affected limb and diminished functional mobility possibly interfering with independence and ability to work. Recurrent infections and wound complications that commonly occur with Lymphedema often require hospitalization and extensive wound care, thus increasing medical costs. The patient has received complete decongestive therapy which includes manual lymphatic drainage, lymphedema specific exercises, compression bandaging, and hygiene/skin care. Goals of therapy are to reduce the edema and prevent re-accumulation of fluid with its complications. It is medically necessary for this patient to have daytime garments to control this condition. They will need 2 sets (one set to wear and one set to wash for adequate skin care and wearing time). Garments must be replaced every 4-6 months for effectiveness. There are no substitutes available that offer acceptable compression treatment for this Lymphedema patient. If further information is requested, please contact our certified lymphedema therapists at [phone].     [x] Yancy Schmitt, PT, DPT, CLT-JÚNIOR  [] Camden Murrieta, PT, [facility]  [] Mireya Hernandez, PT, DPT, CLT-JÚNIOR  [] Marilin Cage, PT, DPT, CLT    Printed  Provider Name Estelle Sherman MD Provider Signature  Date    Provider NPI [id number]

## 2022-11-05 NOTE — PROGRESS NOTES
Jama Calabrese (: 1934) is a 80 y.o. female, established patient, here for evaluation of the following chief complaint(s):  No chief complaint on file. ASSESSMENT/PLAN:  Below is the assessment and plan developed based on review of pertinent history, physical exam, labs, studies, and medications. 1. Medicare annual wellness visit, subsequent  2. Essential hypertension- at goal cont with verapamil and labetalol  3. Acquired hypothyroidism-cont with synthroid and tolerating medicine   4. Recurrent UTI- cont with myrbetriq, estrace cream and cont to see    5. Lymphedema-cont with leg stockings and pump for legs     She needs to do her PT for her neck and work on exercise but she is not doing it     SUBJECTIVE/OBJECTIVE:  HPI    Arthralgias: Pt c/o aches and pains all over, which she does not take medication for. She uses a walker at all times. Pt states that her neck pain is still present, for which she plans to go to PT for. She has not done it yet      Urinary frequency: Pt reports urinating frequently throughout the night. Lymphedema: Pt notes improvement in her swelling since wearing stockings. the clinic is wrapping her legs and has been doing better ; has to wear socks every day ; got a new compression pump over the knee     Hypertension ROS: taking medications as instructed, no medication side effects noted, no TIA's, no chest pain on exertion, no dyspnea on exertion, no swelling of ankles. BP in office today is . 144/84    SUBJECTIVE: Jama Calabrese is a 80 y.o. female here for follow up of hypothyroidism. Lab Results   Component Value Date/Time    TSH 1.76 2022 12:17 PM     Thyroid ROS: denies fatigue, weight changes, heat/cold intolerance, bowel/skin changes or CVS symptoms. H/M: Pt denies reading as of late, but has been playing electronic games.  Not doing social interaction     She is getting dental implants in       Review of Systems   All other systems reviewed and are negative. Physical Exam  Vitals and nursing note reviewed. Constitutional:       Appearance: She is well-developed. HENT:      Head: Normocephalic and atraumatic. Right Ear: External ear normal.      Left Ear: External ear normal.      Nose: Nose normal.   Neck:      Thyroid: No thyroid mass or thyromegaly. Vascular: No carotid bruit or JVD. Cardiovascular:      Rate and Rhythm: Normal rate and regular rhythm. Pulses: Normal pulses. Heart sounds: Normal heart sounds, S1 normal and S2 normal. No murmur heard. No friction rub. No gallop. Pulmonary:      Effort: Pulmonary effort is normal.      Breath sounds: Normal breath sounds. Abdominal:      General: Bowel sounds are normal.      Palpations: Abdomen is soft. Musculoskeletal:         General: Normal range of motion. Cervical back: Normal range of motion and neck supple. Skin:     General: Skin is warm and dry. Neurological:      Mental Status: She is alert and oriented to person, place, and time. Psychiatric:         Behavior: Behavior normal.         Thought Content: Thought content normal.         Judgment: Judgment normal.       On this date 11/07/2022 I have spent 30 minutes   outside wellnessreviewing previous notes, test results and face to face with the patient discussing the diagnosis and importance of compliance with the treatment plan as well as documenting on the day of the visit. An electronic signature was used to authenticate this note. -- Modesto Baez MD   This is the Subsequent Medicare Annual Wellness Exam, performed 12 months or more after the Initial AWV or the last Subsequent AWV    I have reviewed the patient's medical history in detail and updated the computerized patient record. Assessment/Plan   Education and counseling provided:  Are appropriate based on today's review and evaluation    1. Medicare annual wellness visit, subsequent  2.  Essential hypertension  3. Acquired hypothyroidism  4. Recurrent UTI  5. Lymphedema       Depression Risk Factor Screening     3 most recent PHQ Screens 5/5/2022   Little interest or pleasure in doing things Several days   Feeling down, depressed, irritable, or hopeless Several days   Total Score PHQ 2 2       Alcohol & Drug Abuse Risk Screen    Do you average more than 1 drink per night or more than 7 drinks a week:  No    On any one occasion in the past three months have you have had more than 3 drinks containing alcohol:  No          Functional Ability and Level of Safety    Hearing: The patient wears hearing aids. Activities of Daily Living: The home contains: handrails and grab bars  Patient needs help with:  phone, transportation, and walking      Ambulation: with difficulty, uses a walker     Fall Risk:  Fall Risk Assessment, last 12 mths 5/5/2022   Able to walk? Yes   Fall in past 12 months? 0   Do you feel unsteady?  0   Are you worried about falling 0      Abuse Screen:  Patient is not abused       Cognitive Screening    Has your family/caregiver stated any concerns about your memory: no     Cognitive Screening: Normal - MMSE (Mini Mental Status Exam)    Health Maintenance Due     Health Maintenance Due   Topic Date Due    DTaP/Tdap/Td series (1 - Tdap) Never done    Bone Densitometry (Dexa) Screening  Never done    COVID-19 Vaccine (3 - Booster for Pfizer series) 04/02/2021    Flu Vaccine (1) 08/01/2022    Medicare Yearly Exam  11/05/2022       Patient Care Team   Patient Care Team:  Elisa Vaughn MD as PCP - General (Internal Medicine Physician)  Elisa Vaughn MD as PCP - Pravin Soria Provider    History     Patient Active Problem List   Diagnosis Code    Severe obesity ([facility] [address] E66.01     Past Medical History:   Diagnosis Date    Cataract     bilateral eyes    Frequent urinary tract infections     H/O: hysterectomy     Hypertension     Ill-defined condition 01/10/2022    Patient reports latex allergy-skin rash    Melanoma ([facility] [address]     leg    Thyroid disease       Past Surgical History:   Procedure Laterality Date    HX HERNIA REPAIR      x2    HX ORTHOPAEDIC      bilateral TKR    HX ORTHOPAEDIC      left arm fracture repair    HX OTHER SURGICAL      MOES surgery    IR CHOLECYSTOSTOMY PERCUTANEOUS       Current Outpatient Medications   Medication Sig Dispense Refill    verapamil ER (CALAN-SR) 240 mg CR tablet TAKE 1 TABLET BY MOUTH ONCE DAILY AT  NIGHT 90 Tablet 0    labetaloL (NORMODYNE) 200 mg tablet Take 1 tablet by mouth twice daily 180 Tablet 0    levothyroxine (SYNTHROID) 75 mcg tablet TAKE 1 TABLET BY MOUTH ONCE DAILY BEFORE BREAKFAST 90 Tablet 1    B.infantis-B.ani-B.long-B.bifi (PROBIOTIC 4X) 10-15 mg TbEC Take  by mouth daily. famotidine (PEPCID) 20 mg tablet Take 20 mg by mouth two (2) times a day. mirabegron ER (MYRBETRIQ) 25 mg ER tablet Take 50 mg by mouth daily. aluminum-magnesium hydroxide 200-200 mg/5 mL susp 5 mL, diphenhydrAMINE 12.5 mg/5 mL liqd 5 mL, lidocaine 2 % soln 5 mL 5 mL by Swish and Spit route two (2) days a week. Magic mouth wash   Maalox  Lidocaine 2% viscous   Diphenhydramine oral solution     Pharmacy to mix equal portions of ingredients to a total volume as indicated in the dispense amount. cranberry fruit concentrate (AZO CRANBERRY PO) Take  by mouth.      calcium carbonate 500 mg calcium (1,250 mg) tab 500 mg, ergocalciferol (vitamin d2) 400 unit tab 200 Units Take 2 Doses by mouth daily. multivitamin, tx-iron-ca-min (THERA-M W/ IRON) 9 mg iron-400 mcg tab tablet Take 1 Tab by mouth daily. estradiol (ESTRACE) 0.01 % (0.1 mg/gram) vaginal cream Insert 2 g into vagina daily.        Allergies   Allergen Reactions    Adhesive Rash    Sulfa (Sulfonamide Antibiotics) Rash       Family History   Problem Relation Age of Onset    Cancer Father     Cancer Brother      Social History     Tobacco Use    Smoking status: Never    Smokeless tobacco: Never Substance Use Topics    Alcohol use: No         Kisha Diaz MD

## 2022-11-05 NOTE — PATIENT INSTRUCTIONS
Medicare Wellness Visit, Female     The best way to live healthy is to have a lifestyle where you eat a well-balanced diet, exercise regularly, limit alcohol use, and quit all forms of tobacco/nicotine, if applicable. Regular preventive services are another way to keep healthy. Preventive services (vaccines, screening tests, monitoring & exams) can help personalize your care plan, which helps you manage your own care. Screening tests can find health problems at the earliest stages, when they are easiest to treat. [facility] follows the current, evidence-based guidelines published by the [facility] ([facility]) when recommending preventive services for our patients. Because we follow these guidelines, sometimes recommendations change over time as research supports it. (For example, mammograms used to be recommended annually. Even though Medicare will still pay for an annual mammogram, the newer guidelines recommend a mammogram every two years for women of average risk). Of course, you and your doctor may decide to screen more often for some diseases, based on your risk and your co-morbidities (chronic disease you are already diagnosed with). Preventive services for you include:  - Medicare offers their members a free annual wellness visit, which is time for you and your primary care provider to discuss and plan for your preventive service needs. Take advantage of this benefit every year!  -All adults over the age of 72 should receive the recommended pneumonia vaccines. Current USPSTF guidelines recommend a series of two vaccines for the best pneumonia protection.   -All adults should have a flu vaccine yearly and a tetanus vaccine every 10 years.   -All adults age 48 and older should receive the shingles vaccines (series of two vaccines).       -All adults age 38-68 who are overweight should have a diabetes screening test once every three years.   -All adults born between [birth year] and 1965 should be screened once for Hepatitis C.  -Other screening tests and preventive services for persons with diabetes include: an eye exam to screen for diabetic retinopathy, a kidney function test, a foot exam, and stricter control over your cholesterol.   -Cardiovascular screening for adults with routine risk involves an electrocardiogram (ECG) at intervals determined by your doctor.   -Colorectal cancer screenings should be done for adults age 54-65 with no increased risk factors for colorectal cancer. There are a number of acceptable methods of screening for this type of cancer. Each test has its own benefits and drawbacks. Discuss with your doctor what is most appropriate for you during your annual wellness visit. The different tests include: colonoscopy (considered the best screening method), a fecal occult blood test, a fecal DNA test, and sigmoidoscopy.    -A bone mass density test is recommended when a woman turns 65 to screen for osteoporosis. This test is only recommended one time, as a screening. Some providers will use this same test as a disease monitoring tool if you already have osteoporosis. -Breast cancer screenings are recommended every other year for women of normal risk, age 54-69.  -Cervical cancer screenings for women over age 72 are only recommended with certain risk factors.      Here is a list of your current Health Maintenance items (your personalized list of preventive services) with a due date:  Health Maintenance Due   Topic Date Due    DTaP/Tdap/Td  (1 - Tdap) Never done    Bone Mineral Density   Never done    COVID-19 Vaccine (3 - Booster for Sandoval Peter series) 04/02/2021    Yearly Flu Vaccine (1) 08/01/2022    Annual Well Visit  11/05/2022

## 2022-11-07 ENCOUNTER — OFFICE VISIT (OUTPATIENT)
Dept: INTERNAL MEDICINE CLINIC | Age: 87
End: 2022-11-07
Payer: MEDICARE

## 2022-11-07 VITALS
TEMPERATURE: 97.7 F | DIASTOLIC BLOOD PRESSURE: 84 MMHG | RESPIRATION RATE: 14 BRPM | HEART RATE: 70 BPM | SYSTOLIC BLOOD PRESSURE: 144 MMHG | OXYGEN SATURATION: 96 % | BODY MASS INDEX: 33.13 KG/M2 | WEIGHT: 180 LBS | HEIGHT: 62 IN

## 2022-11-07 DIAGNOSIS — I10 ESSENTIAL HYPERTENSION: ICD-10-CM

## 2022-11-07 DIAGNOSIS — N39.0 RECURRENT UTI: ICD-10-CM

## 2022-11-07 DIAGNOSIS — Z00.00 MEDICARE ANNUAL WELLNESS VISIT, SUBSEQUENT: Primary | ICD-10-CM

## 2022-11-07 DIAGNOSIS — E03.9 ACQUIRED HYPOTHYROIDISM: ICD-10-CM

## 2022-11-07 DIAGNOSIS — I89.0 LYMPHEDEMA: ICD-10-CM

## 2022-11-07 PROCEDURE — G0463 HOSPITAL OUTPT CLINIC VISIT: HCPCS | Performed by: INTERNAL MEDICINE

## 2022-11-07 PROCEDURE — G8417 CALC BMI ABV UP PARAM F/U: HCPCS | Performed by: INTERNAL MEDICINE

## 2022-11-07 PROCEDURE — 1101F PT FALLS ASSESS-DOCD LE1/YR: CPT | Performed by: INTERNAL MEDICINE

## 2022-11-07 PROCEDURE — G8510 SCR DEP NEG, NO PLAN REQD: HCPCS | Performed by: INTERNAL MEDICINE

## 2022-11-07 PROCEDURE — G8536 NO DOC ELDER MAL SCRN: HCPCS | Performed by: INTERNAL MEDICINE

## 2022-11-07 PROCEDURE — 1090F PRES/ABSN URINE INCON ASSESS: CPT | Performed by: INTERNAL MEDICINE

## 2022-11-07 PROCEDURE — G8427 DOCREV CUR MEDS BY ELIG CLIN: HCPCS | Performed by: INTERNAL MEDICINE

## 2022-11-07 PROCEDURE — G0439 PPPS, SUBSEQ VISIT: HCPCS | Performed by: INTERNAL MEDICINE

## 2022-11-07 PROCEDURE — 99214 OFFICE O/P EST MOD 30 MIN: CPT | Performed by: INTERNAL MEDICINE

## 2022-11-08 LAB
ALBUMIN SERPL-MCNC: 3.6 G/DL (ref 3.5–5)
ALBUMIN/GLOB SERPL: 1 {RATIO} (ref 1.1–2.2)
ALP SERPL-CCNC: 73 U/L (ref 45–117)
ALT SERPL-CCNC: 15 U/L (ref 12–78)
ANION GAP SERPL CALC-SCNC: 6 MMOL/L (ref 5–15)
AST SERPL-CCNC: 11 U/L (ref 15–37)
BILIRUB SERPL-MCNC: 0.7 MG/DL (ref 0.2–1)
BUN SERPL-MCNC: 33 MG/DL (ref 6–20)
BUN/CREAT SERPL: 21 (ref 12–20)
CALCIUM SERPL-MCNC: 9.7 MG/DL (ref 8.5–10.1)
CHLORIDE SERPL-SCNC: 111 MMOL/L (ref 97–108)
CHOLEST SERPL-MCNC: 125 MG/DL
CO2 SERPL-SCNC: 25 MMOL/L (ref 21–32)
CREAT SERPL-MCNC: 1.58 MG/DL (ref 0.55–1.02)
ERYTHROCYTE [DISTWIDTH] IN BLOOD BY AUTOMATED COUNT: 14.5 % (ref 11.5–14.5)
GLOBULIN SER CALC-MCNC: 3.7 G/DL (ref 2–4)
GLUCOSE SERPL-MCNC: 88 MG/DL (ref 65–100)
HCT VFR BLD AUTO: 33.9 % (ref 35–47)
HDLC SERPL-MCNC: 45 MG/DL
HDLC SERPL: 2.8 {RATIO} (ref 0–5)
HGB BLD-MCNC: 10.8 G/DL (ref 11.5–16)
LDLC SERPL CALC-MCNC: 68 MG/DL (ref 0–100)
MCH RBC QN AUTO: 31.6 PG (ref 26–34)
MCHC RBC AUTO-ENTMCNC: 31.9 G/DL (ref 30–36.5)
MCV RBC AUTO: 99.1 FL (ref 80–99)
NRBC # BLD: 0 K/UL (ref 0–0.01)
NRBC BLD-RTO: 0 PER 100 WBC
PLATELET # BLD AUTO: 233 K/UL (ref 150–400)
PMV BLD AUTO: 10.9 FL (ref 8.9–12.9)
POTASSIUM SERPL-SCNC: 4.6 MMOL/L (ref 3.5–5.1)
PROT SERPL-MCNC: 7.3 G/DL (ref 6.4–8.2)
RBC # BLD AUTO: 3.42 M/UL (ref 3.8–5.2)
SODIUM SERPL-SCNC: 142 MMOL/L (ref 136–145)
T4 FREE SERPL-MCNC: 1.3 NG/DL (ref 0.8–1.5)
TRIGL SERPL-MCNC: 60 MG/DL (ref ?–150)
TSH SERPL DL<=0.05 MIU/L-ACNC: 1.64 UIU/ML (ref 0.36–3.74)
VLDLC SERPL CALC-MCNC: 12 MG/DL
WBC # BLD AUTO: 5.1 K/UL (ref 3.6–11)

## 2022-11-10 LAB
FERRITIN SERPL-MCNC: 104 NG/ML (ref 8–252)
IRON SATN MFR SERPL: 17 % (ref 20–50)
IRON SERPL-MCNC: 37 UG/DL (ref 35–150)
TIBC SERPL-MCNC: 222 UG/DL (ref 250–450)

## 2022-11-11 NOTE — PROGRESS NOTES
Discussed with patient -she has been eating differently as she is needing implants and she cannot chew as well- will try OTC iron three days a week and see me in a month to recheck labs.  Iron is low end of normal so seems reasonable to try this

## 2022-11-20 DIAGNOSIS — E03.9 ACQUIRED HYPOTHYROIDISM: ICD-10-CM

## 2022-11-20 RX ORDER — LEVOTHYROXINE SODIUM 75 UG/1
TABLET ORAL
Qty: 90 TABLET | Refills: 0 | Status: SHIPPED | OUTPATIENT
Start: 2022-11-20

## 2023-02-16 DIAGNOSIS — E03.9 ACQUIRED HYPOTHYROIDISM: ICD-10-CM

## 2023-02-16 RX ORDER — LEVOTHYROXINE SODIUM 75 UG/1
TABLET ORAL
Qty: 90 TABLET | Refills: 0 | Status: SHIPPED | OUTPATIENT
Start: 2023-02-16

## 2023-02-19 DIAGNOSIS — E03.9 ACQUIRED HYPOTHYROIDISM: ICD-10-CM

## 2023-02-20 RX ORDER — LEVOTHYROXINE SODIUM 75 UG/1
TABLET ORAL
Qty: 90 TABLET | Refills: 0 | Status: SHIPPED | OUTPATIENT
Start: 2023-02-20

## 2023-03-02 ENCOUNTER — TELEPHONE (OUTPATIENT)
Dept: INTERNAL MEDICINE CLINIC | Age: 88
End: 2023-03-02

## 2023-03-02 DIAGNOSIS — D50.8 OTHER IRON DEFICIENCY ANEMIA: Primary | ICD-10-CM

## 2023-03-02 NOTE — TELEPHONE ENCOUNTER
I was speaking with patients  to get him scheduled for appointment today - wife wants to know if she can have some labs done while she is in here today with her   She said she was put on some iron medication that she has finished and would like to check her levels if possible today while she's here

## 2023-03-16 ENCOUNTER — HOSPITAL ENCOUNTER (OUTPATIENT)
Dept: PHYSICAL THERAPY | Age: 88
Discharge: HOME | End: 2023-03-16
Payer: MEDICARE

## 2023-03-16 PROCEDURE — 97763 ORTHC/PROSTC MGMT SBSQ ENC: CPT

## 2023-03-16 PROCEDURE — 97530 THERAPEUTIC ACTIVITIES: CPT

## 2023-03-16 PROCEDURE — 97164 PT RE-EVAL EST PLAN CARE: CPT

## 2023-03-16 NOTE — THERAPY EVALUATION
[facility]  [address] [place] [address] Luzmaria EVALUATION    NAME: Ivana Correa AGE: 80 y.o. GENDER: female  DATE: 3/16/23  REFERRING PHYSICIAN: Sulaiman Villanueva MD (PCP) / Pepper Pressley MD (referring)     HISTORY AND BACKGROUND:  Pt is an 79 yo female returning for 6 month re-evaluation of bilateral LE secondary lymphedema. Pt arrives to [facility] wearing custom stockings, using rollator for ambulation, accompanied by daughter. Pt says she just received new stockings and \"hasn't started using them yet\". Pt says she continues to wear stockings every day, spouse assists with don/doffing since pt is unable to do so independently. States she is no longer doing the pump daily but more like 1x/week. No other changes to medical history reported since last visit. Daughter present throughout session. From prior evaluation:   Patient reports 2-3 mo exacerbation of b/l mainly below knee LE swelling with recent infection. Does report swelling that goes into the knees and causes pain and limits walking. Reports 20+ year h/o swelling and has used knee high compression stockings and Vasopneumatic pump for management. States \"its so hard to use those stockings\" and gave them up. Continues to utilize compression pump 2x/day for 45min each. States pump sleeve only goes just above her knees. PMHx: R LE Mohs procedure for Melanoma.  Reports no regional lymph node removal.   Primary Diagnosis:  B/L LE lymphedema, secondary (I89.0)    Other Treatment Diagnoses:  Abnormality of gait ( other abnormalities of gait and mobility R26.89)  Swelling not relieved by elevation ( R60.9 edema)  Venous insufficiency I87.2    Date of Onset: 20 + year h/o swelling with exacerbation 11/2021  Present Symptoms and Functional Limitations: swelling that goes from toes to upper thigh and has Limited her in wearing shoes due to size of feet, decreased walking ability due to swelling, open wounds at times with leaking from her legs - improved with transition to garment use. Lymphedema Life Impact Scale:    32/68, 47% impairment     Past Medical History:   Past Medical History:   Diagnosis Date    Cataract     bilateral eyes    Frequent urinary tract infections     H/O: hysterectomy     Hypertension     Ill-defined condition 01/10/2022    Patient reports latex allergy-skin rash    Melanoma ([address]     leg    Thyroid disease      Past Surgical History:   Procedure Laterality Date    HX HERNIA REPAIR      x2    HX ORTHOPAEDIC      bilateral TKR    HX ORTHOPAEDIC      left arm fracture repair    HX OTHER SURGICAL      MOES surgery    IR CHOLECYSTOSTOMY PERCUTANEOUS       Current Medications:    Current Outpatient Medications   Medication Sig    levothyroxine (SYNTHROID) 75 mcg tablet TAKE 1 TABLET BY MOUTH ONCE DAILY BEFORE BREAKFAST    verapamil ER (CALAN-SR) 240 mg CR tablet TAKE 1 TABLET BY MOUTH ONCE DAILY AT  NIGHT    labetaloL (NORMODYNE) 200 mg tablet Take 1 tablet by mouth twice daily    B.animalis,bifid,infantis,long 10-15 mg TbEC Take  by mouth daily. famotidine (PEPCID) 20 mg tablet Take 20 mg by mouth two (2) times a day. mirabegron ER (MYRBETRIQ) 25 mg ER tablet Take 50 mg by mouth daily. aluminum-magnesium hydroxide 200-200 mg/5 mL susp 5 mL, diphenhydrAMINE 12.5 mg/5 mL liqd 5 mL, lidocaine 2 % soln 5 mL 5 mL by Swish and Spit route two (2) days a week. Magic mouth wash   Maalox  Lidocaine 2% viscous   Diphenhydramine oral solution     Pharmacy to mix equal portions of ingredients to a total volume as indicated in the dispense amount. cranberry fruit concentrate (AZO CRANBERRY PO) Take  by mouth.    calcium carbonate 500 mg calcium (1,250 mg) tab 500 mg, ergocalciferol (vitamin d2) 400 unit tab 200 Units Take 2 Doses by mouth daily.     multivitamin, tx-iron-ca-min (THERA-M W/ IRON) 9 mg iron-400 mcg tab tablet Take 1 Tab by mouth daily. estradiol (ESTRACE) 0.01 % (0.1 mg/gram) vaginal cream Insert 2 g into vagina daily. No current facility-administered medications for this encounter. Allergies: Allergies   Allergen Reactions    Adhesive Rash    Sulfa (Sulfonamide Antibiotics) Rash      Prior Level of Function/Social/Work History/Personal factors and/or comorbidities impacting plan of care:    Living Situation: one story home with spouse   Trainable Caregiver?: Spouse, daughter, self   Self-care/ADLs: Indep upper, needs assistance with shoes/socks/foot/leg care   Mobility: Mod I using rollator    Sleeping Arrangement:  Bed   Adaptive Equipment Owned: Rollator   Previous Therapy:  Lymphedema clinic Jan - March 2021. Compression/Lymphedema Equipment:  [employer] custom knee high stockings, 1 pair about 6 months old. Lymphapress pump. Received new stockings from 9/2022 visit but has not opened/used them at this time. SUBJECTIVE:  Pt says she has worn stockings every day but has stopped using the pump as much. States her spouse is \"forgetting things\" and had difficulty helping her in the pump. States she uses it 1x/week. Patients goals for therapy: \"check and see how my legs are doing\"       OBJECTIVE DATA SUMMARY:   EXAMINATION/PRESENTATION/DECISION MAKING:   Pain: pt denies pain           Self-Care and ADLs:  Grooming: Independent  Bathing: Modified independent    UB Dressing: Independent  LB Dressing: Modified Independent   Don/Doff Shoes/Socks:  Total assistance  Toileting: Modified independent      Skin and Tissue Assessment:  Dermal Status:  [x]   Intact [x]  Dry   []  Tenuous [x] Flaky   []  Wound/lesion present [x]  Scars:  R ant shin (MOES) B/L TKR scars   []  Dermatitis    Texture/Consistency:  [x]  Boggy []  Pitting Edema   [x]  Brawny [x]  Combination   []  Fibrotic/Woody    Pigmentation/Color Change:  []  Normal []  Hemosiderin   []  Red []  Erythematous   [x]  Hyperpigmented [] Hyperlipodermatosclerosis   Anomalies:  []  Lymphorrhea []  Vesicles   []  Petechiae []  Warty Vercusis   []  Bullae []  Papilloma   Circulatory:  []  WESTLEY []  Varicosities:   []  Pulses DP / PT []  Vascular studies ruled out DVT in past   []  DVT History    Nails:  []   Normal  [x]   Fungus  Stemmers Sign: Positive B/L LE  Height:   5'1\"  Weight:   177 lbs (was 182 lbs in Sept 2022)  BMI:   34.4  (36 or greater: adversely affecting lymphedema)  Observation:  3/16/23:       B/L Anterior   Dorsal feet     R Lateral Upper leg lesion    Volumetric Measurements:   Volumes:  Date Right (mL) Left (mL) Volume difference %   Difference   3/16/23 6,062.75 4,952.54 -1,110.21 -18.31   9/8/22 5,208.80 4,915.12 -293.68 -5.64      Percent difference today is -18.31% as compared to -5.64% 6 mo ago. Range of Motion: [facility]  Strength: 3+/5  Sensation:  Intact to LT  Mobility:  Bed/Chair Mobility:  Modified independent  Transfers:  Modified independent    Sitting Balance:  Good, intact no support Standing Balance:  Fair   Gait:  Modified independent  Wheelchair Mobility:  NT   Endurance:  NT Stairs:  NT     Safety:  Patient is alert and oriented: A+O x 4   Safety awareness:  yes   Fall Risk?:  Yes using rollator currently. Reports no falls in last year.    Patient given written fall prevention handout: Yes   Precautions:  Standard lymphedema precautions to include avoiding blood pressure readings, injections and IVs or other procedures/acts that could lead to broken skin on affected area, and avoiding excessive heat, resistive activity or altitude without compression garment    Physical Therapy Evaluation Charge Determination   History Examination Presentation Decision-Making   MEDIUM  Complexity : 1-2 comorbidities / personal factors will impact the outcome/ POC  LOW Complexity : 1-2 Standardized tests and measures addressing body structure, function, activity limitation and / or participation in recreation  LOW Complexity : Stable, uncomplicated  Other outcome measures LLIS  MEDIUM      Based on the above components, the patient evaluation is determined to be of the following complexity level: LOW     Evaluation Time: 12:47 to 1310pm (23 minutes)  TREATMENT PROVIDED:   1. Treatment description:  theract x 1  The patient was re-educated regarding the role and function of the lymphatic system, and instructed in the lymphedema management protocol of complete decongestive therapy (CDT). This includes skin care to prevent breakdown or infection, lymphedema exercises, custom compression therapy options (bandaging, compression garments, compression pump, Fuad Piety, JoViPak, The Anish-Santy, etc. as needed), and decongestion with manual lymphatic drainage as indicated. Reviewed limb volumes as above. Pt presents today with increased limb volumes bilaterally however R>L. Asked to return to twice daily application of lotion to LE's due to dryness. Also resume using the pump daily for 1 hour/session for 4 weeks and return to clinic for recheck of volumes and condition. Patient to bring new compression stockings she's received but not donned yet at this time. Cautioned patient to not use the new ones as they are not fitting based upon volumes today. Treatment time:  2010-5991 Minutes: 22 minutes      ASSESSMENT:   Angélica Palomo is a 80 y.o. female seen today for 6 month re-evaluation of stage 2 secondary lymphedema with contributing CVI. Patient with a 20+ year h/o episodic swelling, with multiple infections (most recent 11/2021). Patient reports prior history of lymphorrhea intermittently and poor wound healing after Mohs surgery R anterior tibia. Today patient demonstrates increased limb volumes R>L despite using custom knee highs daily (garments are 6+ mo old). Has reported reduced Vasopneumatic pump use in the last 6 mo. Her R LE increased by 853mL and the left by 37.42mL.   PT instructed a return to daily pump use for 1 hour over the next 4 weeks. In addition, PT suggesting drying the current compression stockings in the dryer on low heat to encourage reshaping older elastic fibers to contain better. Patient to bring in her newest compression that she has not started wearing for fitting in 4 weeks. Based on volumes she will either be able to wear those out of clinic or will need to be re measured for new custom compression knee highs. Patient still with skin changes associated with chronic swelling such as fibrosis, papillomas, warty overgrowths, dryness, taut skin with poor mobility and turgor. Suggesting return to 2x/daily lotioning to reduce infection risk. Patient is at risk for future skin infections and wounds due to current condition. This care is medically necessary due to the infection risk with lymphedema, and to improve functional activities. CDT is necessary to resolve swelling to allow patient to return to wearing normal clothes/footwear, and prevent worsening of symptoms, such as venous stasis ulcerations, infections, or hospitalizations. Patient will be independent with home program strategies to allow improved ADL ability and mobility and to allow patient to return to greatest functional independence. Rehabilitation potential is considered to be Good. Factors which may influence rehabilitation potential include caregiver assistance, transportation, financial.  Patient will benefit from 2-4 physical therapy visits over 6-8 weeks to optimize improvement in these areas. PLAN OF CARE:   Recommendations and Planned Interventions:  Compression garment fitting/provision  Self-care training  Functional mobility training  Education in skin care and lymphedema precautions  Caregiver education as needed     GOALS  Long term goals  Time frame: 4 weeks    1.  Pt will show improvement in Lymphedema Life Impact Scale by 10 points for improved tolerance for ADLs and recreational activities, thus allow improvement in patient's quality of life. 2. Patient/caregiver will be independent with don/doff of compression system in less than 20 minutes and demonstrate appropriate use in order to prevent re accumulation of fluid at discharge. 3. Patient will achieve maximum edema reduction by 400 mL to reduce risk of falling, allow pt to fit into standard-size clothing and shoes, and return to prior level of function. 4.  Patient/family will perform daily pump use x 1 hour for reduced volumes of R LE by 400mL to be able to fit into new compression stockings. 2.  Patient will demonstrate 50% improvement in skin condition with use of 2x/day lotioing to reduce risk of infections. Patient has participated in goal setting and agrees to work toward plan of care. Patient was instructed to call if questions or concerns arise. Thank you for this referral.  Qamar Hopper PT,DPT, CLGLORIA-JÚNIOR        TREATMENT PLAN EFFECTIVE DATES:   3/16/2023 TO 6/11/23  I have read the above plan of care for [facility]. I certify the above prescribed services are required by this patient and are medically necessary.   The above plan of care has been developed in conjunction with the lymphedema/physical therapist.       Physician Signature: ____________________________________Date:______________

## 2023-03-16 NOTE — THERAPY EVALUATION
Statement of Medical Necessity  Page 1 of 2      Quintin Ta 8/1/1934 Today's Date: 3/16/2023 MARTINA: [employer]   Payor: Flaco Chari / Plan: [place] MEDICARE PART A & B / Product Type: Medicare /  ME: TBD  Refills: 2                   Diagnosis  []   I97.2 Post-Mastectomy Lymphedema []   I87.2 Venous Insufficiency   [x]   I89.0 Lymphedema, other secondary  []   I83.019 Venous Stasis Ulcer LE, Right   []   I89.9 Unspecified Lymphatic Disorder []   I83.029 Venous Stasis Ulcer LE, Left   [x]   R60.9 Swelling not relieved by elevation []   Q82.0 Hereditary/ Congenital Lymphedema   []   C50.211 Malignant neoplasm of breast, Right []   G89.3  Cancer associated pain   []   C50.212 Malignant neoplasm of breast, Left []   L03.115 LE Cellulitis, Right   []    []   L03.116 LE Cellulitis, Left                                     Quintin Ta    8/1/1934  Page 2 of 2    Physician Order for DME for Diagnosis of secondary Lymphedema secondary as Listed on Statement of Medical Necessity, Page 1         Recommended Product:  Units Upper Extremity Rt Lt Units Lower Extremity Rt Lt    Circ-Aid, Ready Wrap, Sigvaris Arm    Inelastic binders (Circ-Aid, Bertha Eulalia)  []Foot   []Below Knee   []Knee   []Thigh      Circ-Aid Ready Wrap, Sigvaris hand    Jassi Noah, night use []Full Leg  []Lower Leg      Tribute Arm, night use    Tribute, night use  []Full Leg  []Lower Leg      Jassi Noah Arm, night use    Jordan Sleeve Leg/ Foot, night use      Gradiant Compression Sleeves & Gloves  []Custom [] RM Arm:  []CCL1 []CCL2 []CCL3  []Custom [] RM Glove: []CCL1 []CCL2 []CCL3     2 Gradient Compression Stockings   [x]Custom  []RM Lower Extremity:   [x]CCL1       [x]CCL2         []CCL3   [x]Knee       []Thigh        []Waist Length X X    Jordan sleeve arm w/ hand, night use    Tribute Wrap, night use      Compression Bra    Vasopneumatic Pump      Compression Vest         The above patient was referred for treatment of Lymphedema due to the diagnosis indicated above. Lymphedema is a progressive and chronic condition. It may cause acute infections, muscle atrophy, discomfort, and connective tissue fibrosis with irreversible tissue damage, decreased ROM in the affected limb and diminished functional mobility possibly interfering with independence and ability to work. Recurrent infections and wound complications that commonly occur with Lymphedema often require hospitalization and extensive wound care, thus increasing medical costs. The patient has received complete decongestive therapy which includes manual lymphatic drainage, lymphedema specific exercises, compression bandaging, and hygiene/skin care. Goals of therapy are to reduce the edema and prevent re-accumulation of fluid with its complications. It is medically necessary for this patient to have daytime garments to control this condition. They will need 2 sets (one set to wear and one set to wash for adequate skin care and wearing time). Garments must be replaced every 4-6 months for effectiveness. There are no substitutes available that offer acceptable compression treatment for this Lymphedema patient. If further information is requested, please contact our certified lymphedema therapists at [phone].     [] Gladys Morejon, PT, DPT, PARTHA  [] Javier Loza PT, PARTHA  [x] Aide Candelario, PT, DPT, [facility]      Printed  Provider Name  Provider Signature  Date    Provider NPI

## 2023-04-27 ENCOUNTER — HOSPITAL ENCOUNTER (OUTPATIENT)
Dept: PHYSICAL THERAPY | Age: 88
Discharge: HOME | End: 2023-04-27
Payer: MEDICARE

## 2023-04-27 PROCEDURE — 97535 SELF CARE MNGMENT TRAINING: CPT

## 2023-04-27 PROCEDURE — 97140 MANUAL THERAPY 1/> REGIONS: CPT

## 2023-04-27 PROCEDURE — 97763 ORTHC/PROSTC MGMT SBSQ ENC: CPT

## 2023-05-08 RX ORDER — VERAPAMIL HYDROCHLORIDE 240 MG/1
TABLET, FILM COATED, EXTENDED RELEASE ORAL
Qty: 90 TABLET | Refills: 0 | Status: SHIPPED | OUTPATIENT
Start: 2023-05-08 | End: 2023-05-14

## 2023-05-14 RX ORDER — VERAPAMIL HYDROCHLORIDE 240 MG/1
TABLET, FILM COATED, EXTENDED RELEASE ORAL
Qty: 90 TABLET | Refills: 0 | Status: SHIPPED | OUTPATIENT
Start: 2023-05-14

## 2023-05-15 ENCOUNTER — APPOINTMENT (OUTPATIENT)
Facility: HOSPITAL | Age: 88
DRG: 330 | End: 2023-05-15
Payer: MEDICARE

## 2023-05-15 ENCOUNTER — HOSPITAL ENCOUNTER (INPATIENT)
Facility: HOSPITAL | Age: 88
LOS: 7 days | Discharge: NURSING, REHAB OR LONG-TERM CARE | DRG: 330 | End: 2023-05-22
Attending: EMERGENCY MEDICINE | Admitting: SURGERY
Payer: MEDICARE

## 2023-05-15 ENCOUNTER — HOSPITAL ENCOUNTER (EMERGENCY)
Facility: HOSPITAL | Age: 88
Discharge: LEFT BEFORE DISCHARGE | End: 2023-05-15

## 2023-05-15 DIAGNOSIS — K43.6 VENTRAL HERNIA WITH OBSTRUCTION AND WITHOUT GANGRENE: Primary | ICD-10-CM

## 2023-05-15 DIAGNOSIS — I48.92 ATRIAL FIBRILLATION AND FLUTTER (HCC): ICD-10-CM

## 2023-05-15 DIAGNOSIS — M79.89 LEG SWELLING: ICD-10-CM

## 2023-05-15 DIAGNOSIS — I48.91 ATRIAL FIBRILLATION AND FLUTTER (HCC): ICD-10-CM

## 2023-05-15 PROBLEM — K56.609 SMALL BOWEL OBSTRUCTION (HCC): Status: ACTIVE | Noted: 2023-05-15

## 2023-05-15 LAB
ALBUMIN SERPL-MCNC: 4.2 G/DL (ref 3.5–5)
ALBUMIN/GLOB SERPL: 1.2 (ref 1.1–2.2)
ALP SERPL-CCNC: 83 U/L (ref 45–117)
ALT SERPL-CCNC: 13 U/L (ref 12–78)
ANION GAP SERPL CALC-SCNC: 9 MMOL/L (ref 5–15)
APPEARANCE UR: CLEAR
AST SERPL-CCNC: 15 U/L (ref 15–37)
BACTERIA URNS QL MICRO: NEGATIVE /HPF
BASOPHILS # BLD: 0 K/UL (ref 0–0.1)
BASOPHILS NFR BLD: 0 % (ref 0–1)
BILIRUB SERPL-MCNC: 0.9 MG/DL (ref 0.2–1)
BILIRUB UR QL: NEGATIVE
BUN SERPL-MCNC: 32 MG/DL (ref 6–20)
BUN/CREAT SERPL: 21 (ref 12–20)
CALCIUM SERPL-MCNC: 10.1 MG/DL (ref 8.5–10.1)
CHLORIDE SERPL-SCNC: 104 MMOL/L (ref 97–108)
CO2 SERPL-SCNC: 27 MMOL/L (ref 21–32)
COLOR UR: ABNORMAL
CREAT SERPL-MCNC: 1.49 MG/DL (ref 0.55–1.02)
DIFFERENTIAL METHOD BLD: ABNORMAL
EOSINOPHIL # BLD: 0.2 K/UL (ref 0–0.4)
EOSINOPHIL NFR BLD: 3 % (ref 0–7)
EPITH CASTS URNS QL MICRO: ABNORMAL /LPF
ERYTHROCYTE [DISTWIDTH] IN BLOOD BY AUTOMATED COUNT: 14.5 % (ref 11.5–14.5)
GLOBULIN SER CALC-MCNC: 3.5 G/DL (ref 2–4)
GLUCOSE SERPL-MCNC: 121 MG/DL (ref 65–100)
GLUCOSE UR STRIP.AUTO-MCNC: NEGATIVE MG/DL
HCT VFR BLD AUTO: 39.1 % (ref 35–47)
HGB BLD-MCNC: 12.7 G/DL (ref 11.5–16)
HGB UR QL STRIP: ABNORMAL
IMM GRANULOCYTES # BLD AUTO: 0 K/UL
IMM GRANULOCYTES NFR BLD AUTO: 0 %
KETONES UR QL STRIP.AUTO: NEGATIVE MG/DL
LEUKOCYTE ESTERASE UR QL STRIP.AUTO: NEGATIVE
LIPASE SERPL-CCNC: 50 U/L (ref 73–393)
LYMPHOCYTES # BLD: 0.8 K/UL (ref 0.8–3.5)
LYMPHOCYTES NFR BLD: 11 % (ref 12–49)
MCH RBC QN AUTO: 31 PG (ref 26–34)
MCHC RBC AUTO-ENTMCNC: 32.5 G/DL (ref 30–36.5)
MCV RBC AUTO: 95.4 FL (ref 80–99)
MONOCYTES # BLD: 0.3 K/UL (ref 0–1)
MONOCYTES NFR BLD: 5 % (ref 5–13)
NEUTS SEG # BLD: 5.6 K/UL (ref 1.8–8)
NEUTS SEG NFR BLD: 81 % (ref 32–75)
NITRITE UR QL STRIP.AUTO: NEGATIVE
NRBC # BLD: 0 K/UL (ref 0–0.01)
NRBC BLD-RTO: 0 PER 100 WBC
PH UR STRIP: 7 (ref 5–8)
PLATELET # BLD AUTO: 214 K/UL (ref 150–400)
PMV BLD AUTO: 10.2 FL (ref 8.9–12.9)
POTASSIUM SERPL-SCNC: 4.1 MMOL/L (ref 3.5–5.1)
PROT SERPL-MCNC: 7.7 G/DL (ref 6.4–8.2)
PROT UR STRIP-MCNC: ABNORMAL MG/DL
RBC # BLD AUTO: 4.1 M/UL (ref 3.8–5.2)
RBC #/AREA URNS HPF: ABNORMAL /HPF (ref 0–5)
RBC MORPH BLD: ABNORMAL
SODIUM SERPL-SCNC: 140 MMOL/L (ref 136–145)
SP GR UR REFRACTOMETRY: 1.01 (ref 1–1.03)
URINE CULTURE IF INDICATED: ABNORMAL
UROBILINOGEN UR QL STRIP.AUTO: 0.2 EU/DL (ref 0.2–1)
WBC # BLD AUTO: 6.9 K/UL (ref 3.6–11)
WBC URNS QL MICRO: ABNORMAL /HPF (ref 0–4)

## 2023-05-15 PROCEDURE — 96374 THER/PROPH/DIAG INJ IV PUSH: CPT

## 2023-05-15 PROCEDURE — 6360000002 HC RX W HCPCS: Performed by: EMERGENCY MEDICINE

## 2023-05-15 PROCEDURE — 81001 URINALYSIS AUTO W/SCOPE: CPT

## 2023-05-15 PROCEDURE — 6360000004 HC RX CONTRAST MEDICATION: Performed by: EMERGENCY MEDICINE

## 2023-05-15 PROCEDURE — 2580000003 HC RX 258: Performed by: EMERGENCY MEDICINE

## 2023-05-15 PROCEDURE — 85025 COMPLETE CBC W/AUTO DIFF WBC: CPT

## 2023-05-15 PROCEDURE — 36415 COLL VENOUS BLD VENIPUNCTURE: CPT

## 2023-05-15 PROCEDURE — 2500000003 HC RX 250 WO HCPCS: Performed by: EMERGENCY MEDICINE

## 2023-05-15 PROCEDURE — 1100000000 HC RM PRIVATE

## 2023-05-15 PROCEDURE — 96376 TX/PRO/DX INJ SAME DRUG ADON: CPT

## 2023-05-15 PROCEDURE — 96361 HYDRATE IV INFUSION ADD-ON: CPT

## 2023-05-15 PROCEDURE — 74176 CT ABD & PELVIS W/O CONTRAST: CPT

## 2023-05-15 PROCEDURE — 96375 TX/PRO/DX INJ NEW DRUG ADDON: CPT

## 2023-05-15 PROCEDURE — 2580000003 HC RX 258: Performed by: SURGERY

## 2023-05-15 PROCEDURE — 83690 ASSAY OF LIPASE: CPT

## 2023-05-15 PROCEDURE — 99285 EMERGENCY DEPT VISIT HI MDM: CPT

## 2023-05-15 PROCEDURE — 80053 COMPREHEN METABOLIC PANEL: CPT

## 2023-05-15 RX ORDER — ONDANSETRON 2 MG/ML
4 INJECTION INTRAMUSCULAR; INTRAVENOUS ONCE
Status: COMPLETED | OUTPATIENT
Start: 2023-05-15 | End: 2023-05-15

## 2023-05-15 RX ORDER — MORPHINE SULFATE 2 MG/ML
2 INJECTION, SOLUTION INTRAMUSCULAR; INTRAVENOUS EVERY 4 HOURS PRN
Status: DISCONTINUED | OUTPATIENT
Start: 2023-05-15 | End: 2023-05-16

## 2023-05-15 RX ORDER — LEVOTHYROXINE SODIUM 0.07 MG/1
TABLET ORAL
COMMUNITY
Start: 2018-11-27

## 2023-05-15 RX ORDER — SODIUM CHLORIDE, SODIUM LACTATE, POTASSIUM CHLORIDE, CALCIUM CHLORIDE 600; 310; 30; 20 MG/100ML; MG/100ML; MG/100ML; MG/100ML
INJECTION, SOLUTION INTRAVENOUS CONTINUOUS
Status: DISCONTINUED | OUTPATIENT
Start: 2023-05-15 | End: 2023-05-16 | Stop reason: SDUPTHER

## 2023-05-15 RX ORDER — 0.9 % SODIUM CHLORIDE 0.9 %
1000 INTRAVENOUS SOLUTION INTRAVENOUS ONCE
Status: COMPLETED | OUTPATIENT
Start: 2023-05-15 | End: 2023-05-15

## 2023-05-15 RX ORDER — ONDANSETRON 2 MG/ML
4 INJECTION INTRAMUSCULAR; INTRAVENOUS EVERY 6 HOURS PRN
Status: DISCONTINUED | OUTPATIENT
Start: 2023-05-15 | End: 2023-05-22 | Stop reason: HOSPADM

## 2023-05-15 RX ORDER — ESTRADIOL 0.1 MG/G
2 CREAM VAGINAL DAILY
COMMUNITY
Start: 2019-08-20

## 2023-05-15 RX ORDER — TROSPIUM CHLORIDE 20 MG/1
20 TABLET, FILM COATED ORAL DAILY
COMMUNITY
End: 2023-05-15

## 2023-05-15 RX ORDER — FAMOTIDINE 20 MG/1
20 TABLET, FILM COATED ORAL 2 TIMES DAILY
COMMUNITY
Start: 2023-05-05

## 2023-05-15 RX ORDER — HYDROMORPHONE HYDROCHLORIDE 1 MG/ML
1 INJECTION, SOLUTION INTRAMUSCULAR; INTRAVENOUS; SUBCUTANEOUS
Status: COMPLETED | OUTPATIENT
Start: 2023-05-15 | End: 2023-05-15

## 2023-05-15 RX ORDER — PROCHLORPERAZINE EDISYLATE 5 MG/ML
10 INJECTION INTRAMUSCULAR; INTRAVENOUS EVERY 6 HOURS PRN
Status: DISCONTINUED | OUTPATIENT
Start: 2023-05-15 | End: 2023-05-15

## 2023-05-15 RX ORDER — MIRABEGRON 50 MG/1
50 TABLET, FILM COATED, EXTENDED RELEASE ORAL DAILY
Status: ON HOLD | COMMUNITY
Start: 2023-04-10 | End: 2023-05-22 | Stop reason: HOSPADM

## 2023-05-15 RX ORDER — LANOLIN ALCOHOL/MO/W.PET/CERES
400 CREAM (GRAM) TOPICAL DAILY
COMMUNITY

## 2023-05-15 RX ORDER — PROCHLORPERAZINE EDISYLATE 5 MG/ML
10 INJECTION INTRAMUSCULAR; INTRAVENOUS ONCE
Status: COMPLETED | OUTPATIENT
Start: 2023-05-15 | End: 2023-05-15

## 2023-05-15 RX ORDER — ONDANSETRON 2 MG/ML
INJECTION INTRAMUSCULAR; INTRAVENOUS
Status: DISCONTINUED
Start: 2023-05-15 | End: 2023-05-15

## 2023-05-15 RX ORDER — LABETALOL HYDROCHLORIDE 5 MG/ML
10 INJECTION, SOLUTION INTRAVENOUS ONCE
Status: COMPLETED | OUTPATIENT
Start: 2023-05-15 | End: 2023-05-15

## 2023-05-15 RX ORDER — SACCHAROMYCES BOULARDII 250 MG
250 CAPSULE ORAL
COMMUNITY

## 2023-05-15 RX ORDER — LABETALOL 200 MG/1
1 TABLET, FILM COATED ORAL 2 TIMES DAILY
COMMUNITY
Start: 2018-11-27

## 2023-05-15 RX ADMIN — SODIUM CHLORIDE 1000 ML: 9 INJECTION, SOLUTION INTRAVENOUS at 15:14

## 2023-05-15 RX ADMIN — ONDANSETRON 4 MG: 2 INJECTION INTRAMUSCULAR; INTRAVENOUS at 15:14

## 2023-05-15 RX ADMIN — LABETALOL HYDROCHLORIDE 10 MG: 5 INJECTION INTRAVENOUS at 19:42

## 2023-05-15 RX ADMIN — ONDANSETRON 4 MG: 2 INJECTION INTRAMUSCULAR; INTRAVENOUS at 16:03

## 2023-05-15 RX ADMIN — IOHEXOL 50 ML: 350 INJECTION, SOLUTION INTRAVENOUS at 15:30

## 2023-05-15 RX ADMIN — SODIUM CHLORIDE, POTASSIUM CHLORIDE, SODIUM LACTATE AND CALCIUM CHLORIDE: 600; 310; 30; 20 INJECTION, SOLUTION INTRAVENOUS at 23:07

## 2023-05-15 RX ADMIN — HYDROMORPHONE HYDROCHLORIDE 1 MG: 1 INJECTION, SOLUTION INTRAMUSCULAR; INTRAVENOUS; SUBCUTANEOUS at 15:14

## 2023-05-15 RX ADMIN — PROCHLORPERAZINE EDISYLATE 10 MG: 5 INJECTION INTRAMUSCULAR; INTRAVENOUS at 17:28

## 2023-05-15 ASSESSMENT — PAIN - FUNCTIONAL ASSESSMENT: PAIN_FUNCTIONAL_ASSESSMENT: PREVENTS OR INTERFERES SOME ACTIVE ACTIVITIES AND ADLS

## 2023-05-15 ASSESSMENT — PAIN SCALES - GENERAL
PAINLEVEL_OUTOF10: 7
PAINLEVEL_OUTOF10: 8

## 2023-05-15 ASSESSMENT — PAIN DESCRIPTION - ORIENTATION
ORIENTATION: LEFT;ANTERIOR
ORIENTATION: LEFT

## 2023-05-15 ASSESSMENT — ENCOUNTER SYMPTOMS
DIARRHEA: 1
SHORTNESS OF BREATH: 0
VOMITING: 1
COUGH: 0
ABDOMINAL PAIN: 1
NAUSEA: 1

## 2023-05-15 ASSESSMENT — PAIN DESCRIPTION - PAIN TYPE: TYPE: ACUTE PAIN

## 2023-05-15 ASSESSMENT — PAIN DESCRIPTION - DESCRIPTORS
DESCRIPTORS: SHARP
DESCRIPTORS: SHARP

## 2023-05-15 ASSESSMENT — LIFESTYLE VARIABLES: HOW OFTEN DO YOU HAVE A DRINK CONTAINING ALCOHOL: PATIENT DECLINED

## 2023-05-15 ASSESSMENT — PAIN DESCRIPTION - FREQUENCY: FREQUENCY: CONTINUOUS

## 2023-05-15 ASSESSMENT — PAIN DESCRIPTION - LOCATION
LOCATION: ABDOMEN
LOCATION: ABDOMEN

## 2023-05-16 ENCOUNTER — ANESTHESIA EVENT (OUTPATIENT)
Facility: HOSPITAL | Age: 88
End: 2023-05-16
Payer: MEDICARE

## 2023-05-16 ENCOUNTER — ANESTHESIA (OUTPATIENT)
Facility: HOSPITAL | Age: 88
End: 2023-05-16
Payer: MEDICARE

## 2023-05-16 PROBLEM — E03.9 HYPOTHYROID: Status: ACTIVE | Noted: 2023-05-16

## 2023-05-16 PROBLEM — N18.9 CKD (CHRONIC KIDNEY DISEASE): Status: ACTIVE | Noted: 2023-05-16

## 2023-05-16 PROBLEM — I10 HTN (HYPERTENSION): Status: ACTIVE | Noted: 2023-05-16

## 2023-05-16 LAB
ANION GAP SERPL CALC-SCNC: 0 MMOL/L (ref 5–15)
BUN SERPL-MCNC: 28 MG/DL (ref 6–20)
BUN/CREAT SERPL: 26 (ref 12–20)
CALCIUM SERPL-MCNC: 9 MG/DL (ref 8.5–10.1)
CHLORIDE SERPL-SCNC: 110 MMOL/L (ref 97–108)
CO2 SERPL-SCNC: 27 MMOL/L (ref 21–32)
CREAT SERPL-MCNC: 1.09 MG/DL (ref 0.55–1.02)
GLUCOSE SERPL-MCNC: 107 MG/DL (ref 65–100)
NT PRO BNP: 6298 PG/ML
POTASSIUM SERPL-SCNC: 3.7 MMOL/L (ref 3.5–5.1)
SODIUM SERPL-SCNC: 137 MMOL/L (ref 136–145)

## 2023-05-16 PROCEDURE — 7100000001 HC PACU RECOVERY - ADDTL 15 MIN: Performed by: SURGERY

## 2023-05-16 PROCEDURE — 2580000003 HC RX 258: Performed by: SURGERY

## 2023-05-16 PROCEDURE — 2500000003 HC RX 250 WO HCPCS: Performed by: INTERNAL MEDICINE

## 2023-05-16 PROCEDURE — 3600000002 HC SURGERY LEVEL 2 BASE: Performed by: SURGERY

## 2023-05-16 PROCEDURE — 3700000000 HC ANESTHESIA ATTENDED CARE: Performed by: SURGERY

## 2023-05-16 PROCEDURE — 2720000010 HC SURG SUPPLY STERILE: Performed by: SURGERY

## 2023-05-16 PROCEDURE — 36415 COLL VENOUS BLD VENIPUNCTURE: CPT

## 2023-05-16 PROCEDURE — 6360000002 HC RX W HCPCS: Performed by: NURSE ANESTHETIST, CERTIFIED REGISTERED

## 2023-05-16 PROCEDURE — 2580000003 HC RX 258: Performed by: ANESTHESIOLOGY

## 2023-05-16 PROCEDURE — 6360000002 HC RX W HCPCS: Performed by: INTERNAL MEDICINE

## 2023-05-16 PROCEDURE — 3600000012 HC SURGERY LEVEL 2 ADDTL 15MIN: Performed by: SURGERY

## 2023-05-16 PROCEDURE — 2500000003 HC RX 250 WO HCPCS: Performed by: ANESTHESIOLOGY

## 2023-05-16 PROCEDURE — 0DT80ZZ RESECTION OF SMALL INTESTINE, OPEN APPROACH: ICD-10-PCS | Performed by: SURGERY

## 2023-05-16 PROCEDURE — 2500000003 HC RX 250 WO HCPCS: Performed by: SURGERY

## 2023-05-16 PROCEDURE — 1100000003 HC PRIVATE W/ TELEMETRY

## 2023-05-16 PROCEDURE — 3700000001 HC ADD 15 MINUTES (ANESTHESIA): Performed by: SURGERY

## 2023-05-16 PROCEDURE — 2500000003 HC RX 250 WO HCPCS: Performed by: NURSE ANESTHETIST, CERTIFIED REGISTERED

## 2023-05-16 PROCEDURE — 6360000002 HC RX W HCPCS: Performed by: ANESTHESIOLOGY

## 2023-05-16 PROCEDURE — 7100000000 HC PACU RECOVERY - FIRST 15 MIN: Performed by: SURGERY

## 2023-05-16 PROCEDURE — 6360000002 HC RX W HCPCS: Performed by: SURGERY

## 2023-05-16 PROCEDURE — 2709999900 HC NON-CHARGEABLE SUPPLY: Performed by: SURGERY

## 2023-05-16 PROCEDURE — C9290 INJ, BUPIVACAINE LIPOSOME: HCPCS | Performed by: SURGERY

## 2023-05-16 PROCEDURE — 83880 ASSAY OF NATRIURETIC PEPTIDE: CPT

## 2023-05-16 PROCEDURE — 88307 TISSUE EXAM BY PATHOLOGIST: CPT

## 2023-05-16 PROCEDURE — 80048 BASIC METABOLIC PNL TOTAL CA: CPT

## 2023-05-16 PROCEDURE — 0WQF0ZZ REPAIR ABDOMINAL WALL, OPEN APPROACH: ICD-10-PCS | Performed by: SURGERY

## 2023-05-16 PROCEDURE — 2500000003 HC RX 250 WO HCPCS: Performed by: HOSPITALIST

## 2023-05-16 PROCEDURE — 2580000003 HC RX 258: Performed by: INTERNAL MEDICINE

## 2023-05-16 RX ORDER — LABETALOL 200 MG/1
200 TABLET, FILM COATED ORAL EVERY 12 HOURS SCHEDULED
Status: DISCONTINUED | OUTPATIENT
Start: 2023-05-16 | End: 2023-05-18

## 2023-05-16 RX ORDER — NEOSTIGMINE METHYLSULFATE 1 MG/ML
INJECTION, SOLUTION INTRAVENOUS PRN
Status: DISCONTINUED | OUTPATIENT
Start: 2023-05-16 | End: 2023-05-16 | Stop reason: SDUPTHER

## 2023-05-16 RX ORDER — METOPROLOL TARTRATE 5 MG/5ML
2.5 INJECTION INTRAVENOUS EVERY 6 HOURS
Status: DISCONTINUED | OUTPATIENT
Start: 2023-05-16 | End: 2023-05-19

## 2023-05-16 RX ORDER — LABETALOL HYDROCHLORIDE 5 MG/ML
5 INJECTION, SOLUTION INTRAVENOUS ONCE
Status: COMPLETED | OUTPATIENT
Start: 2023-05-16 | End: 2023-05-16

## 2023-05-16 RX ORDER — SODIUM CHLORIDE, SODIUM LACTATE, POTASSIUM CHLORIDE, CALCIUM CHLORIDE 600; 310; 30; 20 MG/100ML; MG/100ML; MG/100ML; MG/100ML
INJECTION, SOLUTION INTRAVENOUS CONTINUOUS
Status: DISCONTINUED | OUTPATIENT
Start: 2023-05-16 | End: 2023-05-18

## 2023-05-16 RX ORDER — LEVOTHYROXINE SODIUM 0.07 MG/1
75 TABLET ORAL DAILY
Status: DISCONTINUED | OUTPATIENT
Start: 2023-05-16 | End: 2023-05-22 | Stop reason: HOSPADM

## 2023-05-16 RX ORDER — METOPROLOL TARTRATE 5 MG/5ML
INJECTION INTRAVENOUS PRN
Status: DISCONTINUED | OUTPATIENT
Start: 2023-05-16 | End: 2023-05-16 | Stop reason: SDUPTHER

## 2023-05-16 RX ORDER — MORPHINE SULFATE 2 MG/ML
2 INJECTION, SOLUTION INTRAMUSCULAR; INTRAVENOUS
Status: DISCONTINUED | OUTPATIENT
Start: 2023-05-16 | End: 2023-05-22 | Stop reason: HOSPADM

## 2023-05-16 RX ORDER — MORPHINE SULFATE 1 MG/ML
2 INJECTION, SOLUTION EPIDURAL; INTRATHECAL; INTRAVENOUS
Status: DISCONTINUED | OUTPATIENT
Start: 2023-05-16 | End: 2023-05-16

## 2023-05-16 RX ORDER — METOPROLOL TARTRATE 5 MG/5ML
5 INJECTION INTRAVENOUS ONCE
Status: COMPLETED | OUTPATIENT
Start: 2023-05-16 | End: 2023-05-16

## 2023-05-16 RX ORDER — ONDANSETRON 2 MG/ML
INJECTION INTRAMUSCULAR; INTRAVENOUS PRN
Status: DISCONTINUED | OUTPATIENT
Start: 2023-05-16 | End: 2023-05-16 | Stop reason: SDUPTHER

## 2023-05-16 RX ORDER — DILTIAZEM HYDROCHLORIDE 5 MG/ML
10 INJECTION INTRAVENOUS ONCE
Status: COMPLETED | OUTPATIENT
Start: 2023-05-16 | End: 2023-05-16

## 2023-05-16 RX ORDER — ONDANSETRON 2 MG/ML
4 INJECTION INTRAMUSCULAR; INTRAVENOUS ONCE
Status: COMPLETED | OUTPATIENT
Start: 2023-05-16 | End: 2023-05-16

## 2023-05-16 RX ORDER — DEXAMETHASONE SODIUM PHOSPHATE 4 MG/ML
INJECTION, SOLUTION INTRA-ARTICULAR; INTRALESIONAL; INTRAMUSCULAR; INTRAVENOUS; SOFT TISSUE PRN
Status: DISCONTINUED | OUTPATIENT
Start: 2023-05-16 | End: 2023-05-16 | Stop reason: SDUPTHER

## 2023-05-16 RX ORDER — ROCURONIUM BROMIDE 10 MG/ML
INJECTION, SOLUTION INTRAVENOUS PRN
Status: DISCONTINUED | OUTPATIENT
Start: 2023-05-16 | End: 2023-05-16 | Stop reason: SDUPTHER

## 2023-05-16 RX ORDER — SUCCINYLCHOLINE CHLORIDE 20 MG/ML
INJECTION INTRAMUSCULAR; INTRAVENOUS PRN
Status: DISCONTINUED | OUTPATIENT
Start: 2023-05-16 | End: 2023-05-16 | Stop reason: SDUPTHER

## 2023-05-16 RX ORDER — HYDRALAZINE HYDROCHLORIDE 20 MG/ML
10 INJECTION INTRAMUSCULAR; INTRAVENOUS EVERY 6 HOURS PRN
Status: DISCONTINUED | OUTPATIENT
Start: 2023-05-16 | End: 2023-05-22 | Stop reason: HOSPADM

## 2023-05-16 RX ORDER — DIPHENHYDRAMINE HYDROCHLORIDE 50 MG/ML
12.5 INJECTION INTRAMUSCULAR; INTRAVENOUS
Status: DISCONTINUED | OUTPATIENT
Start: 2023-05-16 | End: 2023-05-16 | Stop reason: HOSPADM

## 2023-05-16 RX ORDER — FENTANYL CITRATE 50 UG/ML
INJECTION, SOLUTION INTRAMUSCULAR; INTRAVENOUS PRN
Status: DISCONTINUED | OUTPATIENT
Start: 2023-05-16 | End: 2023-05-16 | Stop reason: SDUPTHER

## 2023-05-16 RX ORDER — SODIUM CHLORIDE, SODIUM LACTATE, POTASSIUM CHLORIDE, CALCIUM CHLORIDE 600; 310; 30; 20 MG/100ML; MG/100ML; MG/100ML; MG/100ML
INJECTION, SOLUTION INTRAVENOUS CONTINUOUS
Status: DISCONTINUED | OUTPATIENT
Start: 2023-05-16 | End: 2023-05-16 | Stop reason: HOSPADM

## 2023-05-16 RX ORDER — ONDANSETRON 2 MG/ML
4 INJECTION INTRAMUSCULAR; INTRAVENOUS
Status: DISCONTINUED | OUTPATIENT
Start: 2023-05-16 | End: 2023-05-16 | Stop reason: HOSPADM

## 2023-05-16 RX ORDER — PHENYLEPHRINE HCL IN 0.9% NACL 0.4MG/10ML
SYRINGE (ML) INTRAVENOUS PRN
Status: DISCONTINUED | OUTPATIENT
Start: 2023-05-16 | End: 2023-05-16 | Stop reason: SDUPTHER

## 2023-05-16 RX ORDER — GLYCOPYRROLATE 0.2 MG/ML
INJECTION INTRAMUSCULAR; INTRAVENOUS PRN
Status: DISCONTINUED | OUTPATIENT
Start: 2023-05-16 | End: 2023-05-16 | Stop reason: SDUPTHER

## 2023-05-16 RX ORDER — MORPHINE SULFATE 2 MG/ML
1 INJECTION, SOLUTION INTRAMUSCULAR; INTRAVENOUS ONCE
Status: COMPLETED | OUTPATIENT
Start: 2023-05-16 | End: 2023-05-16

## 2023-05-16 RX ORDER — LIDOCAINE HYDROCHLORIDE 10 MG/ML
1 INJECTION, SOLUTION EPIDURAL; INFILTRATION; INTRACAUDAL; PERINEURAL
Status: DISCONTINUED | OUTPATIENT
Start: 2023-05-16 | End: 2023-05-16 | Stop reason: HOSPADM

## 2023-05-16 RX ADMIN — SODIUM CHLORIDE, POTASSIUM CHLORIDE, SODIUM LACTATE AND CALCIUM CHLORIDE: 600; 310; 30; 20 INJECTION, SOLUTION INTRAVENOUS at 13:14

## 2023-05-16 RX ADMIN — ONDANSETRON 4 MG: 2 INJECTION INTRAMUSCULAR; INTRAVENOUS at 05:37

## 2023-05-16 RX ADMIN — Medication 80 MCG: at 13:54

## 2023-05-16 RX ADMIN — LABETALOL HYDROCHLORIDE 5 MG: 5 INJECTION, SOLUTION INTRAVENOUS at 12:23

## 2023-05-16 RX ADMIN — MORPHINE SULFATE 2 MG: 2 INJECTION, SOLUTION INTRAMUSCULAR; INTRAVENOUS at 23:50

## 2023-05-16 RX ADMIN — SODIUM CHLORIDE, POTASSIUM CHLORIDE, SODIUM LACTATE AND CALCIUM CHLORIDE: 600; 310; 30; 20 INJECTION, SOLUTION INTRAVENOUS at 08:42

## 2023-05-16 RX ADMIN — METOPROLOL TARTRATE 2.5 MG: 5 INJECTION INTRAVENOUS at 17:22

## 2023-05-16 RX ADMIN — ROCURONIUM BROMIDE 30 MG: 10 INJECTION INTRAVENOUS at 13:49

## 2023-05-16 RX ADMIN — FENTANYL CITRATE 50 MCG: 50 INJECTION, SOLUTION INTRAMUSCULAR; INTRAVENOUS at 15:16

## 2023-05-16 RX ADMIN — Medication 80 MCG: at 14:43

## 2023-05-16 RX ADMIN — SUCCINYLCHOLINE CHLORIDE 100 MG: 20 INJECTION, SOLUTION INTRAMUSCULAR; INTRAVENOUS at 13:43

## 2023-05-16 RX ADMIN — HYDRALAZINE HYDROCHLORIDE 10 MG: 20 INJECTION INTRAMUSCULAR; INTRAVENOUS at 04:50

## 2023-05-16 RX ADMIN — GLYCOPYRROLATE 0.2 MG: 0.2 INJECTION INTRAMUSCULAR; INTRAVENOUS at 15:43

## 2023-05-16 RX ADMIN — DILTIAZEM HYDROCHLORIDE 10 MG: 5 INJECTION INTRAVENOUS at 20:25

## 2023-05-16 RX ADMIN — MORPHINE SULFATE 1 MG: 2 INJECTION, SOLUTION INTRAMUSCULAR; INTRAVENOUS at 05:38

## 2023-05-16 RX ADMIN — FENTANYL CITRATE 25 MCG: 50 INJECTION, SOLUTION INTRAMUSCULAR; INTRAVENOUS at 15:51

## 2023-05-16 RX ADMIN — ONDANSETRON HYDROCHLORIDE 4 MG: 2 SOLUTION INTRAMUSCULAR; INTRAVENOUS at 14:38

## 2023-05-16 RX ADMIN — METOPROLOL TARTRATE 3 MG: 5 INJECTION, SOLUTION INTRAVENOUS at 13:55

## 2023-05-16 RX ADMIN — DEXAMETHASONE SODIUM PHOSPHATE 4 MG: 4 INJECTION, SOLUTION INTRAMUSCULAR; INTRAVENOUS at 14:03

## 2023-05-16 RX ADMIN — FENTANYL CITRATE 25 MCG: 50 INJECTION, SOLUTION INTRAMUSCULAR; INTRAVENOUS at 14:31

## 2023-05-16 RX ADMIN — METOPROLOL TARTRATE 5 MG: 5 INJECTION INTRAVENOUS at 19:16

## 2023-05-16 RX ADMIN — Medication 40 MCG: at 13:56

## 2023-05-16 RX ADMIN — SODIUM CHLORIDE, POTASSIUM CHLORIDE, SODIUM LACTATE AND CALCIUM CHLORIDE: 600; 310; 30; 20 INJECTION, SOLUTION INTRAVENOUS at 12:29

## 2023-05-16 RX ADMIN — Medication 80 MCG: at 15:25

## 2023-05-16 RX ADMIN — METOPROLOL TARTRATE 2 MG: 5 INJECTION, SOLUTION INTRAVENOUS at 14:00

## 2023-05-16 RX ADMIN — METOPROLOL TARTRATE 2.5 MG: 5 INJECTION INTRAVENOUS at 04:13

## 2023-05-16 RX ADMIN — SODIUM CHLORIDE 5 MG/HR: 900 INJECTION, SOLUTION INTRAVENOUS at 20:35

## 2023-05-16 RX ADMIN — ROCURONIUM BROMIDE 20 MG: 10 INJECTION INTRAVENOUS at 15:01

## 2023-05-16 RX ADMIN — NEOSTIGMINE METHYLSULFATE 3 MG: 1 INJECTION, SOLUTION INTRAVENOUS at 15:43

## 2023-05-16 RX ADMIN — HYDROMORPHONE HYDROCHLORIDE 0.5 MG: 1 INJECTION, SOLUTION INTRAMUSCULAR; INTRAVENOUS; SUBCUTANEOUS at 17:30

## 2023-05-16 RX ADMIN — HYDROMORPHONE HYDROCHLORIDE 0.5 MG: 1 INJECTION, SOLUTION INTRAMUSCULAR; INTRAVENOUS; SUBCUTANEOUS at 16:53

## 2023-05-16 RX ADMIN — FENTANYL CITRATE 25 MCG: 50 INJECTION, SOLUTION INTRAMUSCULAR; INTRAVENOUS at 14:27

## 2023-05-16 ASSESSMENT — PAIN SCALES - GENERAL
PAINLEVEL_OUTOF10: 5
PAINLEVEL_OUTOF10: 6
PAINLEVEL_OUTOF10: 5
PAINLEVEL_OUTOF10: 7

## 2023-05-16 ASSESSMENT — PAIN - FUNCTIONAL ASSESSMENT
PAIN_FUNCTIONAL_ASSESSMENT: PREVENTS OR INTERFERES SOME ACTIVE ACTIVITIES AND ADLS
PAIN_FUNCTIONAL_ASSESSMENT: 0-10

## 2023-05-16 ASSESSMENT — PAIN DESCRIPTION - LOCATION
LOCATION: ABDOMEN
LOCATION: ABDOMEN

## 2023-05-16 ASSESSMENT — PAIN DESCRIPTION - DESCRIPTORS
DESCRIPTORS: SHARP;ACHING
DESCRIPTORS: ACHING

## 2023-05-16 NOTE — ANESTHESIA PRE PROCEDURE
Department of Anesthesiology  Preprocedure Note       Name:  Rey Waite   Age:  80 y.o.  :  1934                                          MRN:  [id number]         Date:  2023      Surgeon: Donna Trejo):  Patt Ganser, MD    Procedure: Procedure(s): HERNIA VENTRAL REPAIR INCARCERATED    Medications prior to admission:   Prior to Admission medications    Medication Sig Start Date End Date Taking?  Authorizing Provider   estradiol (ESTRACE) 0.1 MG/GM vaginal cream Place 2 g vaginally daily 19  Yes Historical Provider, MD   labetalol (NORMODYNE) 200 MG tablet Take 1 tablet by mouth 2 times daily 18  Yes Historical Provider, MD   levothyroxine (SYNTHROID) 75 MCG tablet TAKE 1 TABLET BY MOUTH ONCE DAILY BEFORE BREAKFAST 18  Yes Historical Provider, MD   Phenazopyridine HCl (AZO-STANDARD PO) Take by mouth   Yes Historical Provider, MD   famotidine (PEPCID) 20 MG tablet Take 1 tablet by mouth 2 times daily 23   Historical Provider, MD   folic acid (FOLVITE) 707 MCG tablet Take 1 tablet by mouth daily    Historical Provider, MD   MYRBETRIQ 50 MG TB24 Take 50 mg by mouth daily 4/10/23   Historical Provider, MD   saccharomyces boulardii (FLORASTOR) 250 MG capsule Take 1 capsule by mouth    Historical Provider, MD   verapamil (CALAN SR) 240 MG extended release tablet TAKE 1 TABLET BY MOUTH ONCE DAILY AT NIGHT 23   Merle Ryan MD       Current medications:    Current Facility-Administered Medications   Medication Dose Route Frequency Provider Last Rate Last Admin    [Held by provider] levothyroxine (SYNTHROID) tablet 75 mcg  75 mcg Oral Daily Amadou Chaves MD        [facility] [place] by provider] labetalol (NORMODYNE) tablet 200 mg  200 mg Oral 2 times per day MD Dorinda Mckinney [facility] [place] by provider] verapamil (CALAN SR) extended release tablet 240 mg  240 mg Oral Nightly Amadou Chaves MD        hydrALAZINE (APRESOLINE) injection 10 mg  10 mg IntraVENous Q6H PRN

## 2023-05-16 NOTE — ANESTHESIA PRE PROCEDURE
Department of Anesthesiology  Preprocedure Note       Name:  Kg Choudhury   Age:  80 y.o.  :  1934                                          MRN:  [id number]         Date:  2023      Surgeon: Svitlana Fatima):  Lul Soto MD    Procedure: Procedure(s): HERNIA VENTRAL REPAIR INCARCERATED    Medications prior to admission:   Prior to Admission medications    Medication Sig Start Date End Date Taking?  Authorizing Provider   estradiol (ESTRACE) 0.1 MG/GM vaginal cream Place 2 g vaginally daily 19  Yes Historical Provider, MD   labetalol (NORMODYNE) 200 MG tablet Take 1 tablet by mouth 2 times daily 18  Yes Historical Provider, MD   levothyroxine (SYNTHROID) 75 MCG tablet TAKE 1 TABLET BY MOUTH ONCE DAILY BEFORE BREAKFAST 18  Yes Historical Provider, MD   Phenazopyridine HCl (AZO-STANDARD PO) Take by mouth   Yes Historical Provider, MD   famotidine (PEPCID) 20 MG tablet Take 1 tablet by mouth 2 times daily 23   Historical Provider, MD   folic acid (FOLVITE) 899 MCG tablet Take 1 tablet by mouth daily    Historical Provider, MD   MYRBETRIQ 50 MG TB24 Take 50 mg by mouth daily 4/10/23   Historical Provider, MD   saccharomyces boulardii (FLORASTOR) 250 MG capsule Take 1 capsule by mouth    Historical Provider, MD   verapamil (CALAN SR) 240 MG extended release tablet TAKE 1 TABLET BY MOUTH ONCE DAILY AT NIGHT 23   Hammad Ellis MD       Current medications:    Current Facility-Administered Medications   Medication Dose Route Frequency Provider Last Rate Last Admin    [Held by provider] levothyroxine (SYNTHROID) tablet 75 mcg  75 mcg Oral Daily Lizette Au MD        [facility] [place] by provider] labetalol (NORMODYNE) tablet 200 mg  200 mg Oral 2 times per day MD Miquel Ramirez [facility] [place] by provider] verapamil (CALAN SR) extended release tablet 240 mg  240 mg Oral Nightly Lizette Au MD        hydrALAZINE (APRESOLINE) injection 10 mg  10 mg IntraVENous Q6H PRN

## 2023-05-17 ENCOUNTER — APPOINTMENT (OUTPATIENT)
Facility: HOSPITAL | Age: 88
DRG: 330 | End: 2023-05-17
Payer: MEDICARE

## 2023-05-17 PROBLEM — I48.0 PAF (PAROXYSMAL ATRIAL FIBRILLATION) (HCC): Status: ACTIVE | Noted: 2023-05-17

## 2023-05-17 PROBLEM — K43.6 INCARCERATED VENTRAL HERNIA: Status: ACTIVE | Noted: 2023-05-17

## 2023-05-17 PROBLEM — I48.91 ATRIAL FIBRILLATION WITH RAPID VENTRICULAR RESPONSE (HCC): Status: ACTIVE | Noted: 2023-05-17

## 2023-05-17 PROBLEM — N18.30 CKD (CHRONIC KIDNEY DISEASE) STAGE 3, GFR 30-59 ML/MIN (HCC): Status: ACTIVE | Noted: 2023-05-16

## 2023-05-17 LAB
ECHO AO ASC DIAM: 3.5 CM
ECHO AO ASCENDING AORTA INDEX: 1.93 CM/M2
ECHO AV AREA PEAK VELOCITY: 2.5 CM2
ECHO AV AREA VTI: 2.6 CM2
ECHO AV AREA/BSA PEAK VELOCITY: 1.4 CM2/M2
ECHO AV AREA/BSA VTI: 1.4 CM2/M2
ECHO AV MEAN GRADIENT: 3 MMHG
ECHO AV MEAN VELOCITY: 0.9 M/S
ECHO AV PEAK GRADIENT: 7 MMHG
ECHO AV PEAK VELOCITY: 1.3 M/S
ECHO AV VELOCITY RATIO: 0.77
ECHO AV VTI: 15.5 CM
ECHO BSA: 1.88 M2
ECHO LA DIAMETER INDEX: 2.6 CM/M2
ECHO LA DIAMETER: 4.7 CM
ECHO LA VOL 2C: 76 ML (ref 22–52)
ECHO LA VOL 2C: 80 ML (ref 22–52)
ECHO LA VOL 4C: 111 ML (ref 22–52)
ECHO LA VOL 4C: 118 ML (ref 22–52)
ECHO LA VOL BP: 93 ML (ref 22–52)
ECHO LA VOL/BSA BIPLANE: 51 ML/M2 (ref 16–34)
ECHO LA VOLUME AREA LENGTH: 99 ML
ECHO LA VOLUME INDEX AREA LENGTH: 55 ML/M2 (ref 16–34)
ECHO LV E' LATERAL VELOCITY: 7 CM/S
ECHO LV E' SEPTAL VELOCITY: 6 CM/S
ECHO LV EDV A2C: 73 ML
ECHO LV EDV A4C: 57 ML
ECHO LV EDV BP: 67 ML (ref 56–104)
ECHO LV EDV INDEX A4C: 31 ML/M2
ECHO LV EDV INDEX BP: 37 ML/M2
ECHO LV EDV NDEX A2C: 40 ML/M2
ECHO LV EJECTION FRACTION A2C: 57 %
ECHO LV EJECTION FRACTION A4C: 44 %
ECHO LV EJECTION FRACTION BIPLANE: 53 % (ref 55–100)
ECHO LV ESV A2C: 31 ML
ECHO LV ESV A4C: 32 ML
ECHO LV ESV BP: 32 ML (ref 19–49)
ECHO LV ESV INDEX A2C: 17 ML/M2
ECHO LV ESV INDEX A4C: 18 ML/M2
ECHO LV ESV INDEX BP: 18 ML/M2
ECHO LV FRACTIONAL SHORTENING: 32 % (ref 28–44)
ECHO LV INTERNAL DIMENSION DIASTOLE INDEX: 2.43 CM/M2
ECHO LV INTERNAL DIMENSION DIASTOLIC: 4.4 CM (ref 3.9–5.3)
ECHO LV INTERNAL DIMENSION SYSTOLIC INDEX: 1.66 CM/M2
ECHO LV INTERNAL DIMENSION SYSTOLIC: 3 CM
ECHO LV IVSD: 1.2 CM (ref 0.6–0.9)
ECHO LV MASS 2D: 180 G (ref 67–162)
ECHO LV MASS INDEX 2D: 99.4 G/M2 (ref 43–95)
ECHO LV POSTERIOR WALL DIASTOLIC: 1.1 CM (ref 0.6–0.9)
ECHO LV RELATIVE WALL THICKNESS RATIO: 0.5
ECHO LVOT AREA: 3.1 CM2
ECHO LVOT AV VTI INDEX: 0.8
ECHO LVOT DIAM: 2 CM
ECHO LVOT MEAN GRADIENT: 2 MMHG
ECHO LVOT PEAK GRADIENT: 4 MMHG
ECHO LVOT PEAK VELOCITY: 1 M/S
ECHO LVOT STROKE VOLUME INDEX: 21.5 ML/M2
ECHO LVOT SV: 38.9 ML
ECHO LVOT VTI: 12.4 CM
ECHO MV A VELOCITY: 0.25 M/S
ECHO MV E DECELERATION TIME (DT): 152.8 MS
ECHO MV E VELOCITY: 1.04 M/S
ECHO MV E/A RATIO: 4.16
ECHO MV E/E' LATERAL: 14.86
ECHO MV E/E' RATIO (AVERAGED): 16.1
ECHO MV E/E' SEPTAL: 17.33
ECHO PULMONARY ARTERY END DIASTOLIC PRESSURE: 5 MMHG
ECHO PV MAX VELOCITY: 1 M/S
ECHO PV PEAK GRADIENT: 4 MMHG
ECHO PV REGURGITANT MAX VELOCITY: 1.2 M/S
ECHO RV FREE WALL PEAK S': 10 CM/S
ECHO RV TAPSE: 1 CM (ref 1.7–?)
ECHO TV REGURGITANT MAX VELOCITY: 2.8 M/S
ECHO TV REGURGITANT PEAK GRADIENT: 32 MMHG
EKG DIAGNOSIS: NORMAL
EKG Q-T INTERVAL: 308 MS
EKG QRS DURATION: 86 MS
EKG QTC CALCULATION (BAZETT): 437 MS
EKG R AXIS: -22 DEGREES
EKG T AXIS: 114 DEGREES
EKG VENTRICULAR RATE: 121 BPM
TSH SERPL DL<=0.05 MIU/L-ACNC: 1.91 UIU/ML (ref 0.36–3.74)
TSH SERPL DL<=0.05 MIU/L-ACNC: 1.93 UIU/ML (ref 0.36–3.74)

## 2023-05-17 PROCEDURE — 84443 ASSAY THYROID STIM HORMONE: CPT

## 2023-05-17 PROCEDURE — APPSS30 APP SPLIT SHARED TIME 16-30 MINUTES: Performed by: NURSE PRACTITIONER

## 2023-05-17 PROCEDURE — 93306 TTE W/DOPPLER COMPLETE: CPT

## 2023-05-17 PROCEDURE — 1100000003 HC PRIVATE W/ TELEMETRY

## 2023-05-17 PROCEDURE — 93306 TTE W/DOPPLER COMPLETE: CPT | Performed by: SPECIALIST

## 2023-05-17 PROCEDURE — 94761 N-INVAS EAR/PLS OXIMETRY MLT: CPT

## 2023-05-17 PROCEDURE — 2500000003 HC RX 250 WO HCPCS: Performed by: NURSE PRACTITIONER

## 2023-05-17 PROCEDURE — 2500000003 HC RX 250 WO HCPCS: Performed by: INTERNAL MEDICINE

## 2023-05-17 PROCEDURE — 6360000002 HC RX W HCPCS: Performed by: SURGERY

## 2023-05-17 PROCEDURE — 51798 US URINE CAPACITY MEASURE: CPT

## 2023-05-17 PROCEDURE — 99223 1ST HOSP IP/OBS HIGH 75: CPT | Performed by: SPECIALIST

## 2023-05-17 PROCEDURE — 36415 COLL VENOUS BLD VENIPUNCTURE: CPT

## 2023-05-17 RX ADMIN — METOPROLOL TARTRATE 2.5 MG: 5 INJECTION INTRAVENOUS at 15:26

## 2023-05-17 RX ADMIN — MORPHINE SULFATE 2 MG: 2 INJECTION, SOLUTION INTRAMUSCULAR; INTRAVENOUS at 04:13

## 2023-05-17 RX ADMIN — MORPHINE SULFATE 2 MG: 2 INJECTION, SOLUTION INTRAMUSCULAR; INTRAVENOUS at 09:27

## 2023-05-17 RX ADMIN — PHENYLEPHRINE HYDROCHLORIDE 1 SPRAY: 0.5 SPRAY NASAL at 21:37

## 2023-05-17 RX ADMIN — MORPHINE SULFATE 2 MG: 2 INJECTION, SOLUTION INTRAMUSCULAR; INTRAVENOUS at 20:25

## 2023-05-17 RX ADMIN — METOPROLOL TARTRATE 2.5 MG: 5 INJECTION INTRAVENOUS at 04:14

## 2023-05-17 RX ADMIN — METOPROLOL TARTRATE 2.5 MG: 5 INJECTION INTRAVENOUS at 21:37

## 2023-05-17 RX ADMIN — METOPROLOL TARTRATE 2.5 MG: 5 INJECTION INTRAVENOUS at 09:11

## 2023-05-17 ASSESSMENT — PAIN DESCRIPTION - ORIENTATION
ORIENTATION: MID
ORIENTATION: ANTERIOR
ORIENTATION: MID

## 2023-05-17 ASSESSMENT — PAIN DESCRIPTION - PAIN TYPE
TYPE: ACUTE PAIN
TYPE: ACUTE PAIN

## 2023-05-17 ASSESSMENT — PAIN SCALES - GENERAL
PAINLEVEL_OUTOF10: 0
PAINLEVEL_OUTOF10: 7
PAINLEVEL_OUTOF10: 8
PAINLEVEL_OUTOF10: 5

## 2023-05-17 ASSESSMENT — PAIN DESCRIPTION - LOCATION
LOCATION: ABDOMEN

## 2023-05-17 ASSESSMENT — PAIN DESCRIPTION - DESCRIPTORS
DESCRIPTORS: ACHING;DISCOMFORT;DULL
DESCRIPTORS: STABBING
DESCRIPTORS: DISCOMFORT

## 2023-05-17 ASSESSMENT — PAIN - FUNCTIONAL ASSESSMENT: PAIN_FUNCTIONAL_ASSESSMENT: ACTIVITIES ARE NOT PREVENTED

## 2023-05-17 NOTE — ANESTHESIA POSTPROCEDURE EVALUATION
Department of Anesthesiology  Postprocedure Note    Patient: Jori Barbosa  MRN: [id number]  YOB: 1934  Date of evaluation: 5/17/2023      Procedure Summary     Date: 05/16/23 Room / Location: [facility] MAIN OR  / [facility] MAIN OR    Anesthesia Start: 1324 Anesthesia Stop: 4965    Procedure: RECURRENT  INCARCERATED VENTRAL HERNIA  REPAIR; EXTENSIVE LYSIS OF ADHESIONS;SMALL BOWEL RESECTION (Abdomen) Diagnosis:       Recurrent ventral hernia      (Recurrent ventral hernia [K43.2])    Surgeons: Saul Smith MD Responsible Provider: Valeriano Cobos MD    Anesthesia Type: general ASA Status: 3 - Emergent          Anesthesia Type: No value filed.     Rama Phase I: Rama Score: 8    Rama Phase II:        Anesthesia Post Evaluation    Patient location during evaluation: PACU  Patient participation: complete - patient participated  Level of consciousness: awake  Airway patency: patent  Nausea & Vomiting: no vomiting and no nausea  Complications: no  Cardiovascular status: hemodynamically stable  Respiratory status: acceptable  Hydration status: stable

## 2023-05-18 ENCOUNTER — APPOINTMENT (OUTPATIENT)
Facility: HOSPITAL | Age: 88
DRG: 330 | End: 2023-05-18
Payer: MEDICARE

## 2023-05-18 PROBLEM — R09.02 HYPOXIA: Status: ACTIVE | Noted: 2023-05-18

## 2023-05-18 PROBLEM — G47.33 OSA (OBSTRUCTIVE SLEEP APNEA): Status: ACTIVE | Noted: 2023-05-18

## 2023-05-18 PROBLEM — J90 BILATERAL PLEURAL EFFUSION: Status: ACTIVE | Noted: 2023-05-18

## 2023-05-18 PROBLEM — J98.11 PULMONARY ATELECTASIS: Status: ACTIVE | Noted: 2023-05-18

## 2023-05-18 LAB
ANION GAP SERPL CALC-SCNC: 2 MMOL/L (ref 5–15)
BUN SERPL-MCNC: 43 MG/DL (ref 6–20)
BUN/CREAT SERPL: 38 (ref 12–20)
CALCIUM SERPL-MCNC: 8.6 MG/DL (ref 8.5–10.1)
CHLORIDE SERPL-SCNC: 111 MMOL/L (ref 97–108)
CO2 SERPL-SCNC: 26 MMOL/L (ref 21–32)
COMMENT:: NORMAL
CREAT SERPL-MCNC: 1.12 MG/DL (ref 0.55–1.02)
GLUCOSE SERPL-MCNC: 109 MG/DL (ref 65–100)
POTASSIUM SERPL-SCNC: 3.9 MMOL/L (ref 3.5–5.1)
SODIUM SERPL-SCNC: 139 MMOL/L (ref 136–145)
SPECIMEN HOLD: NORMAL

## 2023-05-18 PROCEDURE — 2580000003 HC RX 258: Performed by: INTERNAL MEDICINE

## 2023-05-18 PROCEDURE — 99232 SBSQ HOSP IP/OBS MODERATE 35: CPT | Performed by: SPECIALIST

## 2023-05-18 PROCEDURE — 2500000003 HC RX 250 WO HCPCS: Performed by: INTERNAL MEDICINE

## 2023-05-18 PROCEDURE — 51798 US URINE CAPACITY MEASURE: CPT

## 2023-05-18 PROCEDURE — 80048 BASIC METABOLIC PNL TOTAL CA: CPT

## 2023-05-18 PROCEDURE — 97116 GAIT TRAINING THERAPY: CPT

## 2023-05-18 PROCEDURE — 1100000003 HC PRIVATE W/ TELEMETRY

## 2023-05-18 PROCEDURE — 6360000002 HC RX W HCPCS: Performed by: SURGERY

## 2023-05-18 PROCEDURE — 6370000000 HC RX 637 (ALT 250 FOR IP): Performed by: SPECIALIST

## 2023-05-18 PROCEDURE — APPSS30 APP SPLIT SHARED TIME 16-30 MINUTES: Performed by: NURSE PRACTITIONER

## 2023-05-18 PROCEDURE — 2700000000 HC OXYGEN THERAPY PER DAY

## 2023-05-18 PROCEDURE — 71045 X-RAY EXAM CHEST 1 VIEW: CPT

## 2023-05-18 PROCEDURE — 97162 PT EVAL MOD COMPLEX 30 MIN: CPT

## 2023-05-18 PROCEDURE — 36415 COLL VENOUS BLD VENIPUNCTURE: CPT

## 2023-05-18 RX ORDER — FAMOTIDINE 20 MG/1
20 TABLET, FILM COATED ORAL 2 TIMES DAILY
Status: ON HOLD | COMMUNITY
End: 2023-05-22 | Stop reason: HOSPADM

## 2023-05-18 RX ORDER — SODIUM CHLORIDE, SODIUM LACTATE, POTASSIUM CHLORIDE, CALCIUM CHLORIDE 600; 310; 30; 20 MG/100ML; MG/100ML; MG/100ML; MG/100ML
INJECTION, SOLUTION INTRAVENOUS CONTINUOUS
Status: DISCONTINUED | OUTPATIENT
Start: 2023-05-18 | End: 2023-05-22

## 2023-05-18 RX ORDER — BUMETANIDE 0.25 MG/ML
1 INJECTION INTRAMUSCULAR; INTRAVENOUS 2 TIMES DAILY
Status: DISCONTINUED | OUTPATIENT
Start: 2023-05-18 | End: 2023-05-19

## 2023-05-18 RX ADMIN — BUMETANIDE 1 MG: 0.25 INJECTION INTRAMUSCULAR; INTRAVENOUS at 10:14

## 2023-05-18 RX ADMIN — DILTIAZEM HYDROCHLORIDE 30 MG: 30 TABLET, FILM COATED ORAL at 13:57

## 2023-05-18 RX ADMIN — METOPROLOL TARTRATE 2.5 MG: 5 INJECTION INTRAVENOUS at 15:11

## 2023-05-18 RX ADMIN — SODIUM CHLORIDE 10 MG/HR: 900 INJECTION, SOLUTION INTRAVENOUS at 04:26

## 2023-05-18 RX ADMIN — SODIUM CHLORIDE 10 MG/HR: 900 INJECTION, SOLUTION INTRAVENOUS at 15:10

## 2023-05-18 RX ADMIN — METOPROLOL TARTRATE 2.5 MG: 5 INJECTION INTRAVENOUS at 03:07

## 2023-05-18 RX ADMIN — DILTIAZEM HYDROCHLORIDE 30 MG: 30 TABLET, FILM COATED ORAL at 17:42

## 2023-05-18 RX ADMIN — METOPROLOL TARTRATE 2.5 MG: 5 INJECTION INTRAVENOUS at 21:18

## 2023-05-18 RX ADMIN — BUMETANIDE 1 MG: 0.25 INJECTION INTRAMUSCULAR; INTRAVENOUS at 21:18

## 2023-05-18 RX ADMIN — MORPHINE SULFATE 2 MG: 2 INJECTION, SOLUTION INTRAMUSCULAR; INTRAVENOUS at 04:30

## 2023-05-18 RX ADMIN — METOPROLOL TARTRATE 2.5 MG: 5 INJECTION INTRAVENOUS at 08:43

## 2023-05-18 ASSESSMENT — PAIN SCALES - GENERAL
PAINLEVEL_OUTOF10: 8
PAINLEVEL_OUTOF10: 5
PAINLEVEL_OUTOF10: 0

## 2023-05-18 ASSESSMENT — PAIN DESCRIPTION - PAIN TYPE: TYPE: ACUTE PAIN

## 2023-05-18 ASSESSMENT — PAIN - FUNCTIONAL ASSESSMENT: PAIN_FUNCTIONAL_ASSESSMENT: ACTIVITIES ARE NOT PREVENTED

## 2023-05-18 ASSESSMENT — PAIN DESCRIPTION - DESCRIPTORS: DESCRIPTORS: ACHING

## 2023-05-18 ASSESSMENT — PAIN DESCRIPTION - LOCATION: LOCATION: ABDOMEN

## 2023-05-19 LAB
ANION GAP SERPL CALC-SCNC: 4 MMOL/L (ref 5–15)
BUN SERPL-MCNC: 41 MG/DL (ref 6–20)
BUN/CREAT SERPL: 37 (ref 12–20)
CALCIUM SERPL-MCNC: 8.4 MG/DL (ref 8.5–10.1)
CHLORIDE SERPL-SCNC: 112 MMOL/L (ref 97–108)
CO2 SERPL-SCNC: 25 MMOL/L (ref 21–32)
CREAT SERPL-MCNC: 1.12 MG/DL (ref 0.55–1.02)
ERYTHROCYTE [DISTWIDTH] IN BLOOD BY AUTOMATED COUNT: 15.1 % (ref 11.5–14.5)
EST. AVERAGE GLUCOSE BLD GHB EST-MCNC: 108 MG/DL
GLUCOSE SERPL-MCNC: 85 MG/DL (ref 65–100)
HBA1C MFR BLD: 5.4 % (ref 4–5.6)
HCT VFR BLD AUTO: 33.2 % (ref 35–47)
HGB BLD-MCNC: 10.9 G/DL (ref 11.5–16)
MAGNESIUM SERPL-MCNC: 1.9 MG/DL (ref 1.6–2.4)
MCH RBC QN AUTO: 30.9 PG (ref 26–34)
MCHC RBC AUTO-ENTMCNC: 32.8 G/DL (ref 30–36.5)
MCV RBC AUTO: 94.1 FL (ref 80–99)
NRBC # BLD: 0 K/UL (ref 0–0.01)
NRBC BLD-RTO: 0 PER 100 WBC
NT PRO BNP: 9534 PG/ML
PHOSPHATE SERPL-MCNC: 1.8 MG/DL (ref 2.6–4.7)
PLATELET # BLD AUTO: 196 K/UL (ref 150–400)
PMV BLD AUTO: 10.4 FL (ref 8.9–12.9)
POTASSIUM SERPL-SCNC: 3.5 MMOL/L (ref 3.5–5.1)
RBC # BLD AUTO: 3.53 M/UL (ref 3.8–5.2)
SODIUM SERPL-SCNC: 141 MMOL/L (ref 136–145)
WBC # BLD AUTO: 12.3 K/UL (ref 3.6–11)

## 2023-05-19 PROCEDURE — 83735 ASSAY OF MAGNESIUM: CPT

## 2023-05-19 PROCEDURE — 80048 BASIC METABOLIC PNL TOTAL CA: CPT

## 2023-05-19 PROCEDURE — 97530 THERAPEUTIC ACTIVITIES: CPT

## 2023-05-19 PROCEDURE — 36415 COLL VENOUS BLD VENIPUNCTURE: CPT

## 2023-05-19 PROCEDURE — 83036 HEMOGLOBIN GLYCOSYLATED A1C: CPT

## 2023-05-19 PROCEDURE — 2580000003 HC RX 258: Performed by: INTERNAL MEDICINE

## 2023-05-19 PROCEDURE — 2500000003 HC RX 250 WO HCPCS: Performed by: INTERNAL MEDICINE

## 2023-05-19 PROCEDURE — 6370000000 HC RX 637 (ALT 250 FOR IP): Performed by: SPECIALIST

## 2023-05-19 PROCEDURE — 84100 ASSAY OF PHOSPHORUS: CPT

## 2023-05-19 PROCEDURE — 1100000003 HC PRIVATE W/ TELEMETRY

## 2023-05-19 PROCEDURE — 83880 ASSAY OF NATRIURETIC PEPTIDE: CPT

## 2023-05-19 PROCEDURE — 85027 COMPLETE CBC AUTOMATED: CPT

## 2023-05-19 PROCEDURE — 99232 SBSQ HOSP IP/OBS MODERATE 35: CPT | Performed by: SPECIALIST

## 2023-05-19 PROCEDURE — 6370000000 HC RX 637 (ALT 250 FOR IP): Performed by: NURSE PRACTITIONER

## 2023-05-19 PROCEDURE — APPSS30 APP SPLIT SHARED TIME 16-30 MINUTES: Performed by: NURSE PRACTITIONER

## 2023-05-19 PROCEDURE — 97116 GAIT TRAINING THERAPY: CPT

## 2023-05-19 RX ORDER — LABETALOL 200 MG/1
200 TABLET, FILM COATED ORAL EVERY 12 HOURS SCHEDULED
Status: DISCONTINUED | OUTPATIENT
Start: 2023-05-19 | End: 2023-05-22 | Stop reason: HOSPADM

## 2023-05-19 RX ORDER — BUMETANIDE 0.25 MG/ML
1 INJECTION INTRAMUSCULAR; INTRAVENOUS EVERY 12 HOURS
Status: DISCONTINUED | OUTPATIENT
Start: 2023-05-19 | End: 2023-05-19

## 2023-05-19 RX ADMIN — POTASSIUM PHOSPHATE, MONOBASIC POTASSIUM PHOSPHATE, DIBASIC 30 MMOL: 224; 236 INJECTION, SOLUTION, CONCENTRATE INTRAVENOUS at 10:08

## 2023-05-19 RX ADMIN — APIXABAN 5 MG: 5 TABLET, FILM COATED ORAL at 21:25

## 2023-05-19 RX ADMIN — BUMETANIDE 1 MG: 0.25 INJECTION INTRAMUSCULAR; INTRAVENOUS at 08:54

## 2023-05-19 RX ADMIN — DILTIAZEM HYDROCHLORIDE 60 MG: 30 TABLET, FILM COATED ORAL at 21:25

## 2023-05-19 RX ADMIN — SODIUM CHLORIDE, POTASSIUM CHLORIDE, SODIUM LACTATE AND CALCIUM CHLORIDE: 600; 310; 30; 20 INJECTION, SOLUTION INTRAVENOUS at 05:35

## 2023-05-19 RX ADMIN — LABETALOL HYDROCHLORIDE 200 MG: 200 TABLET, FILM COATED ORAL at 12:18

## 2023-05-19 RX ADMIN — LABETALOL HYDROCHLORIDE 200 MG: 200 TABLET, FILM COATED ORAL at 21:25

## 2023-05-19 RX ADMIN — DILTIAZEM HYDROCHLORIDE 30 MG: 30 TABLET, FILM COATED ORAL at 03:00

## 2023-05-19 RX ADMIN — APIXABAN 5 MG: 5 TABLET, FILM COATED ORAL at 10:09

## 2023-05-19 RX ADMIN — DILTIAZEM HYDROCHLORIDE 60 MG: 30 TABLET, FILM COATED ORAL at 15:18

## 2023-05-19 RX ADMIN — METOPROLOL TARTRATE 2.5 MG: 5 INJECTION INTRAVENOUS at 03:00

## 2023-05-19 RX ADMIN — DILTIAZEM HYDROCHLORIDE 60 MG: 30 TABLET, FILM COATED ORAL at 08:54

## 2023-05-19 ASSESSMENT — PAIN SCALES - GENERAL
PAINLEVEL_OUTOF10: 0
PAINLEVEL_OUTOF10: 0

## 2023-05-20 LAB
ANION GAP SERPL CALC-SCNC: 2 MMOL/L (ref 5–15)
BUN SERPL-MCNC: 40 MG/DL (ref 6–20)
BUN/CREAT SERPL: 38 (ref 12–20)
CALCIUM SERPL-MCNC: 7.9 MG/DL (ref 8.5–10.1)
CHLORIDE SERPL-SCNC: 111 MMOL/L (ref 97–108)
CO2 SERPL-SCNC: 28 MMOL/L (ref 21–32)
CREAT SERPL-MCNC: 1.06 MG/DL (ref 0.55–1.02)
GLUCOSE SERPL-MCNC: 121 MG/DL (ref 65–100)
MAGNESIUM SERPL-MCNC: 1.8 MG/DL (ref 1.6–2.4)
NT PRO BNP: 6116 PG/ML
PHOSPHATE SERPL-MCNC: 2.8 MG/DL (ref 2.6–4.7)
POTASSIUM SERPL-SCNC: 3.6 MMOL/L (ref 3.5–5.1)
SODIUM SERPL-SCNC: 141 MMOL/L (ref 136–145)

## 2023-05-20 PROCEDURE — 1100000003 HC PRIVATE W/ TELEMETRY

## 2023-05-20 PROCEDURE — 80048 BASIC METABOLIC PNL TOTAL CA: CPT

## 2023-05-20 PROCEDURE — 6370000000 HC RX 637 (ALT 250 FOR IP): Performed by: INTERNAL MEDICINE

## 2023-05-20 PROCEDURE — 84100 ASSAY OF PHOSPHORUS: CPT

## 2023-05-20 PROCEDURE — 6370000000 HC RX 637 (ALT 250 FOR IP): Performed by: SPECIALIST

## 2023-05-20 PROCEDURE — 2580000003 HC RX 258: Performed by: SURGERY

## 2023-05-20 PROCEDURE — 99233 SBSQ HOSP IP/OBS HIGH 50: CPT | Performed by: INTERNAL MEDICINE

## 2023-05-20 PROCEDURE — 83735 ASSAY OF MAGNESIUM: CPT

## 2023-05-20 PROCEDURE — 83880 ASSAY OF NATRIURETIC PEPTIDE: CPT

## 2023-05-20 PROCEDURE — 6370000000 HC RX 637 (ALT 250 FOR IP): Performed by: NURSE PRACTITIONER

## 2023-05-20 PROCEDURE — 36415 COLL VENOUS BLD VENIPUNCTURE: CPT

## 2023-05-20 RX ADMIN — APIXABAN 5 MG: 5 TABLET, FILM COATED ORAL at 20:52

## 2023-05-20 RX ADMIN — DILTIAZEM HYDROCHLORIDE 60 MG: 30 TABLET, FILM COATED ORAL at 17:07

## 2023-05-20 RX ADMIN — LABETALOL HYDROCHLORIDE 200 MG: 200 TABLET, FILM COATED ORAL at 08:04

## 2023-05-20 RX ADMIN — LABETALOL HYDROCHLORIDE 200 MG: 200 TABLET, FILM COATED ORAL at 20:52

## 2023-05-20 RX ADMIN — APIXABAN 5 MG: 5 TABLET, FILM COATED ORAL at 08:04

## 2023-05-20 RX ADMIN — DILTIAZEM HYDROCHLORIDE 60 MG: 30 TABLET, FILM COATED ORAL at 20:51

## 2023-05-20 RX ADMIN — LEVOTHYROXINE SODIUM 75 MCG: 0.07 TABLET ORAL at 05:10

## 2023-05-20 RX ADMIN — DILTIAZEM HYDROCHLORIDE 60 MG: 30 TABLET, FILM COATED ORAL at 03:19

## 2023-05-20 RX ADMIN — SODIUM CHLORIDE, POTASSIUM CHLORIDE, SODIUM LACTATE AND CALCIUM CHLORIDE: 600; 310; 30; 20 INJECTION, SOLUTION INTRAVENOUS at 22:36

## 2023-05-20 RX ADMIN — DILTIAZEM HYDROCHLORIDE 60 MG: 30 TABLET, FILM COATED ORAL at 08:04

## 2023-05-20 ASSESSMENT — PAIN SCALES - GENERAL: PAINLEVEL_OUTOF10: 0

## 2023-05-21 ENCOUNTER — APPOINTMENT (OUTPATIENT)
Dept: VASCULAR SURGERY | Facility: HOSPITAL | Age: 88
DRG: 330 | End: 2023-05-21
Payer: MEDICARE

## 2023-05-21 LAB
ANION GAP SERPL CALC-SCNC: 4 MMOL/L (ref 5–15)
BUN SERPL-MCNC: 37 MG/DL (ref 6–20)
BUN/CREAT SERPL: 38 (ref 12–20)
CALCIUM SERPL-MCNC: 7.8 MG/DL (ref 8.5–10.1)
CHLORIDE SERPL-SCNC: 110 MMOL/L (ref 97–108)
CO2 SERPL-SCNC: 28 MMOL/L (ref 21–32)
CREAT SERPL-MCNC: 0.98 MG/DL (ref 0.55–1.02)
ERYTHROCYTE [DISTWIDTH] IN BLOOD BY AUTOMATED COUNT: 14.4 % (ref 11.5–14.5)
GLUCOSE SERPL-MCNC: 105 MG/DL (ref 65–100)
HCT VFR BLD AUTO: 31 % (ref 35–47)
HGB BLD-MCNC: 10.3 G/DL (ref 11.5–16)
MAGNESIUM SERPL-MCNC: 1.7 MG/DL (ref 1.6–2.4)
MCH RBC QN AUTO: 31.1 PG (ref 26–34)
MCHC RBC AUTO-ENTMCNC: 33.2 G/DL (ref 30–36.5)
MCV RBC AUTO: 93.7 FL (ref 80–99)
NRBC # BLD: 0 K/UL (ref 0–0.01)
NRBC BLD-RTO: 0 PER 100 WBC
NT PRO BNP: 4166 PG/ML
PHOSPHATE SERPL-MCNC: 2.5 MG/DL (ref 2.6–4.7)
PLATELET # BLD AUTO: 202 K/UL (ref 150–400)
PMV BLD AUTO: 10.4 FL (ref 8.9–12.9)
POTASSIUM SERPL-SCNC: 3.6 MMOL/L (ref 3.5–5.1)
RBC # BLD AUTO: 3.31 M/UL (ref 3.8–5.2)
SODIUM SERPL-SCNC: 142 MMOL/L (ref 136–145)
WBC # BLD AUTO: 6.4 K/UL (ref 3.6–11)

## 2023-05-21 PROCEDURE — 6370000000 HC RX 637 (ALT 250 FOR IP): Performed by: INTERNAL MEDICINE

## 2023-05-21 PROCEDURE — 80048 BASIC METABOLIC PNL TOTAL CA: CPT

## 2023-05-21 PROCEDURE — 85027 COMPLETE CBC AUTOMATED: CPT

## 2023-05-21 PROCEDURE — 1100000003 HC PRIVATE W/ TELEMETRY

## 2023-05-21 PROCEDURE — 93970 EXTREMITY STUDY: CPT

## 2023-05-21 PROCEDURE — 97116 GAIT TRAINING THERAPY: CPT

## 2023-05-21 PROCEDURE — 6370000000 HC RX 637 (ALT 250 FOR IP): Performed by: NURSE PRACTITIONER

## 2023-05-21 PROCEDURE — 97165 OT EVAL LOW COMPLEX 30 MIN: CPT

## 2023-05-21 PROCEDURE — 6370000000 HC RX 637 (ALT 250 FOR IP): Performed by: SPECIALIST

## 2023-05-21 PROCEDURE — 83735 ASSAY OF MAGNESIUM: CPT

## 2023-05-21 PROCEDURE — 83880 ASSAY OF NATRIURETIC PEPTIDE: CPT

## 2023-05-21 PROCEDURE — 36415 COLL VENOUS BLD VENIPUNCTURE: CPT

## 2023-05-21 PROCEDURE — 2580000003 HC RX 258: Performed by: SURGERY

## 2023-05-21 PROCEDURE — 97535 SELF CARE MNGMENT TRAINING: CPT

## 2023-05-21 PROCEDURE — 84100 ASSAY OF PHOSPHORUS: CPT

## 2023-05-21 PROCEDURE — 97530 THERAPEUTIC ACTIVITIES: CPT

## 2023-05-21 RX ORDER — POLYETHYLENE GLYCOL 3350 17 G/17G
17 POWDER, FOR SOLUTION ORAL 2 TIMES DAILY PRN
Status: DISCONTINUED | OUTPATIENT
Start: 2023-05-21 | End: 2023-05-22 | Stop reason: HOSPADM

## 2023-05-21 RX ORDER — DILTIAZEM HYDROCHLORIDE 240 MG/1
240 CAPSULE, COATED, EXTENDED RELEASE ORAL DAILY
Status: DISCONTINUED | OUTPATIENT
Start: 2023-05-21 | End: 2023-05-22 | Stop reason: HOSPADM

## 2023-05-21 RX ORDER — SENNA AND DOCUSATE SODIUM 50; 8.6 MG/1; MG/1
1 TABLET, FILM COATED ORAL 2 TIMES DAILY
Status: DISCONTINUED | OUTPATIENT
Start: 2023-05-21 | End: 2023-05-22 | Stop reason: HOSPADM

## 2023-05-21 RX ORDER — BISACODYL 10 MG
10 SUPPOSITORY, RECTAL RECTAL DAILY PRN
Status: DISCONTINUED | OUTPATIENT
Start: 2023-05-21 | End: 2023-05-22 | Stop reason: HOSPADM

## 2023-05-21 RX ADMIN — DILTIAZEM HYDROCHLORIDE 60 MG: 30 TABLET, FILM COATED ORAL at 03:03

## 2023-05-21 RX ADMIN — APIXABAN 5 MG: 5 TABLET, FILM COATED ORAL at 08:23

## 2023-05-21 RX ADMIN — DILTIAZEM HYDROCHLORIDE 240 MG: 240 CAPSULE, EXTENDED RELEASE ORAL at 08:23

## 2023-05-21 RX ADMIN — LABETALOL HYDROCHLORIDE 200 MG: 200 TABLET, FILM COATED ORAL at 20:33

## 2023-05-21 RX ADMIN — SENNOSIDES AND DOCUSATE SODIUM 1 TABLET: 50; 8.6 TABLET ORAL at 09:29

## 2023-05-21 RX ADMIN — SODIUM CHLORIDE, POTASSIUM CHLORIDE, SODIUM LACTATE AND CALCIUM CHLORIDE: 600; 310; 30; 20 INJECTION, SOLUTION INTRAVENOUS at 19:38

## 2023-05-21 RX ADMIN — APIXABAN 5 MG: 5 TABLET, FILM COATED ORAL at 20:32

## 2023-05-21 RX ADMIN — LABETALOL HYDROCHLORIDE 200 MG: 200 TABLET, FILM COATED ORAL at 08:23

## 2023-05-21 RX ADMIN — LEVOTHYROXINE SODIUM 75 MCG: 0.07 TABLET ORAL at 05:15

## 2023-05-21 RX ADMIN — SENNOSIDES AND DOCUSATE SODIUM 1 TABLET: 50; 8.6 TABLET ORAL at 20:32

## 2023-05-21 ASSESSMENT — PAIN SCALES - GENERAL
PAINLEVEL_OUTOF10: 4
PAINLEVEL_OUTOF10: 0

## 2023-05-22 VITALS
RESPIRATION RATE: 21 BRPM | OXYGEN SATURATION: 96 % | SYSTOLIC BLOOD PRESSURE: 154 MMHG | TEMPERATURE: 97.9 F | DIASTOLIC BLOOD PRESSURE: 73 MMHG | HEIGHT: 61 IN | BODY MASS INDEX: 33.3 KG/M2 | HEART RATE: 72 BPM | WEIGHT: 176.37 LBS

## 2023-05-22 LAB
GLUCOSE BLD STRIP.AUTO-MCNC: 110 MG/DL (ref 65–117)
SARS-COV-2 RDRP RESP QL NAA+PROBE: NOT DETECTED
SERVICE CMNT-IMP: NORMAL
SOURCE: NORMAL

## 2023-05-22 PROCEDURE — 82962 GLUCOSE BLOOD TEST: CPT

## 2023-05-22 PROCEDURE — 87635 SARS-COV-2 COVID-19 AMP PRB: CPT

## 2023-05-22 PROCEDURE — 6370000000 HC RX 637 (ALT 250 FOR IP): Performed by: INTERNAL MEDICINE

## 2023-05-22 PROCEDURE — 6370000000 HC RX 637 (ALT 250 FOR IP): Performed by: SPECIALIST

## 2023-05-22 PROCEDURE — 6370000000 HC RX 637 (ALT 250 FOR IP): Performed by: NURSE PRACTITIONER

## 2023-05-22 RX ORDER — DILTIAZEM HYDROCHLORIDE 240 MG/1
240 CAPSULE, COATED, EXTENDED RELEASE ORAL DAILY
Qty: 30 CAPSULE | Refills: 0 | Status: SHIPPED
Start: 2023-05-23

## 2023-05-22 RX ORDER — SENNA AND DOCUSATE SODIUM 50; 8.6 MG/1; MG/1
1 TABLET, FILM COATED ORAL 2 TIMES DAILY
Qty: 60 TABLET | Refills: 0 | Status: SHIPPED
Start: 2023-05-22

## 2023-05-22 RX ORDER — POLYETHYLENE GLYCOL 3350 17 G/17G
17 POWDER, FOR SOLUTION ORAL 2 TIMES DAILY PRN
Qty: 30 EACH | Refills: 0 | Status: SHIPPED
Start: 2023-05-22 | End: 2023-06-21

## 2023-05-22 RX ADMIN — APIXABAN 5 MG: 5 TABLET, FILM COATED ORAL at 08:18

## 2023-05-22 RX ADMIN — LABETALOL HYDROCHLORIDE 200 MG: 200 TABLET, FILM COATED ORAL at 08:18

## 2023-05-22 RX ADMIN — SENNOSIDES AND DOCUSATE SODIUM 1 TABLET: 50; 8.6 TABLET ORAL at 08:18

## 2023-05-22 RX ADMIN — LEVOTHYROXINE SODIUM 75 MCG: 0.07 TABLET ORAL at 05:08

## 2023-05-22 RX ADMIN — DILTIAZEM HYDROCHLORIDE 240 MG: 240 CAPSULE, EXTENDED RELEASE ORAL at 08:18

## 2023-05-22 ASSESSMENT — PAIN SCALES - GENERAL
PAINLEVEL_OUTOF10: 0

## 2023-05-22 NOTE — PROGRESS NOTES
0700: Bedside shift change report given to [address] [place] [address]  (oncoming nurse) by Char Witt (offgoing nurse). Report included the following information Nurse Handoff Report, Index, and Adult Overview. A flutter     1520: Discharge medications reviewed with the patient and caregiver and appropriate educational materials and side effects teaching were provided. Patient had no further questions, patient discharged. 1530: Attempted to call report to nelly rosales without answer.

## 2023-05-22 NOTE — CARE COORDINATION
1416:  [facility] can accept the pt today. Rapid covid results pending. Awaiting d/c order. Transition of Care Plan: RUR-22%  Medical management continues  Updated notes sent in [facility] to [facility]  Pt's daughter plans to transport pt at d/c  CM following for d/c needs  KIMBERLY Cunningham

## 2023-05-22 NOTE — PROGRESS NOTES
1900  Bedside shift change report given to Neal Cherry (oncoming nurse) by Mary Anne Zuñiga RN (offgoing nurse). Report included the following information SBAR, Kardex, ED Summary, Intake/Output, and Recent Results. 0700  Bedside shift change report given to Radha Flores RN (oncoming nurse) by Neal Cherry (offgoing nurse). Report included the following information SBAR, Kardex, ED Summary, Intake/Output, and Recent Results. This patient was assisted with Intentional Toileting every 2 hours during this shift as appropriate. Documentation of ambulation and output reflected on Flowsheet as appropriate. Purposeful hourly rounding was completed using AIDET and 5Ps. Outcomes of PHR documented as they occurred. Bed alarm in use as appropriate. Dual Suction and ambubag in place.

## 2023-05-22 NOTE — DISCHARGE SUMMARY
mirabegron (MYRBETRIQ) 25 MG TB24 Take 2 tablets by mouth daily      famotidine (PEPCID) 20 MG tablet Take 1 tablet by mouth 2 times daily      folic acid (FOLVITE) 537 MCG tablet Take 1 tablet by mouth daily      saccharomyces boulardii (FLORASTOR) 250 MG capsule Take 1 capsule by mouth           STOP taking these medications       trospium (SANCTURA) 20 MG tablet Comments:   Reason for Stopping:         Phenazopyridine HCl (AZO-STANDARD PO) Comments:   Reason for Stopping:         verapamil (CALAN SR) 240 MG extended release tablet Comments:   Reason for Stopping:               Activity: no heavy lifting for 4 weeks    Diet: regular diet    Wound Care: May shower.  No baths or pools for 1.5 weeks    Follow-up: 2 weeks with surgery

## 2023-05-22 NOTE — CARE COORDINATION
Transition of Care Plan to SNF/Rehab    Communication to Patient/Family:  Met with patient and family and they are agreeable to the transition plan. The Plan for Transition of Care is related to the following treatment goals: The Patient and/or patient representative was provided with a choice of provider and agrees  with the discharge plan. Yes [x] No []    A Freedom of choice list was provided with basic dialogue that supports the patient's individualized plan of care/goals and shares the quality data associated with the providers. Yes [x] No []    SNF/Rehab Transition:  Patient has been accepted to [facility] SNF/Rehab and meets criteria for admission. Patient will transported by patient's daughter and expected to leave at 4 pm.    Communication to SNF/Rehab:  Bedside RN, Trever Mehta, has been notified to update the transition plan to the facility and call report ([phone]). Discharge information has been updated on the AVS. And communicated to facility via [email or web address], or CC link. Nursing Please include all hard scripts for controlled substances, med rec and dc summary, and AVS in packet. Reviewed and confirmed with facility, [facility], can manage the patient care needs for the following:     [place] People with (X) only those applicable:  Medication:  [x]Medications are available at the facility  []IV Antibiotics    []Controlled Substance - hard copies available sent.   []Weekly Labs    Equipment:  []CPAP/BiPAP  []Wound Vacuum  []Maharaj or Urinary Device  []PICC/Central Line  []Nebulizer  []Ventilator    Treatment:  []Isolation (for MRSA, VRE, etc.)  []Surgical Drain Management  []Tracheostomy Care  []Dressing Changes  []Dialysis with transportation  []PEG Care  []Oxygen  []Daily Weights for Heart Failure    Dietary:  []Any diet limitations  []Tube Feedings   []Total Parenteral Management (TPN)    Financial Resources:  []Medicaid

## 2023-05-22 NOTE — PROGRESS NOTES
[facility] [place] [facility]  [address]  [phone]        Hospitalist Progress Note      NAME: Андрей Lee   :  1934  MRM:  [id number]    Date/Time of service: 2023  12:06 PM       Subjective:     Chief Complaint:  Patient was personally seen and examined by me during this time period. Chart reviewed. Tolerating diet. HR stable on oral meds       Objective:       Vitals:       Last 24hrs VS reviewed since prior progress note.  Most recent are:    Vitals:    23 1055   BP: (!) 153/87   Pulse: 88   Resp: 20   Temp: 98 °F (36.7 °C)   SpO2: 96%     SpO2 Readings from Last 6 Encounters:   23 96%          Intake/Output Summary (Last 24 hours) at 2023 1206  Last data filed at 2023 0400  Gross per 24 hour   Intake 400 ml   Output 650 ml   Net -250 ml          Exam:     Physical Exam:    Gen:  elderly, frail, ill-appearing, NAD  HEENT:  Pink conjunctivae, PERRL, hearing intact to voice, moist mucous membranes  Neck:  Supple, without masses, thyroid non-tender  Resp:  No accessory muscle use, crackles at bases  Card:  No murmurs, irregular, normal S1, S2 without thrills, bruits or peripheral edema  Abd:  Soft, generalized pain, decreased BS  Musc:  No cyanosis or clubbing  Skin:  No rashes   Neuro:  Cranial nerves 3-12 are grossly intact, generalized weakness, follows commands appropriately  Psych:  poor insight, oriented to person, place     Medications Reviewed: (see below)    Lab Data Reviewed: (see below)    ______________________________________________________________________    Medications:     Current Facility-Administered Medications   Medication Dose Route Frequency    dilTIAZem (CARDIZEM CD) extended release capsule 240 mg  240 mg Oral Daily    sennosides-docusate sodium (SENOKOT-S) 8.6-50 MG tablet 1 tablet  1 tablet Oral BID    bisacodyl (DULCOLAX) suppository 10 mg  10 mg Rectal Daily PRN    polyethylene glycol (GLYCOLAX)

## 2023-05-22 NOTE — DISCHARGE INSTRUCTIONS
P O S T-OP E R AT I V E I N S T RU C T I O N S      Below is a list of instructions which are important for you to follow. If you have questions, please feel free to call your surgeons office. 1 EATING:Regular diet    2 EXERCISE: No heavy lifting for 4 weeks from surgery date (>10lbs)    3. DRESSING:You may shower 1 day. No baths or pools for 2 weeks    4. NO LIFTING: No lifting >10lbs for 4 weeks from surgery date. If you were given a binder in the hospital, wear it during the day for 4 weeks from surgery date. Do not need to wear it to sleep    5. DRIVING: No driving for 5-7 days but you may ride as a passenger anytime. No driving if taking narcotics. 6. PAIN: A prescription for pain has been given to you or your family. Please use it as prescribed and if this is inadequate, please call your surgeons office. In some cases, Tylenol or Advil may be adequate; and in that case, you will not need to fill the prescription. Pain medication may cause constipation - Colace or Milk of Magnesia may be used as needed. May take Tylenol 1000mg every 6 hours as needed for pain  May take Ibuprofen 600-800mg every 6 hours as needed for pain    [unfilled]    7 WOUND: If you notice any increased redness, swelling, pain, or fever, please call the office. If this is noticed at a time after normal office hours, please call our answering service. 8. URINATION: If unable to void (unable to pass your water) please return to the hospital emergency department. 9. DISCOLORATION: Do not be alarmed by black or bluish discoloration of your anatomy. This may extent to the scrotum or labia. This will be due to blood under the skin that will absorb itself and resolve over several days with no ill-effects. 10 APPOINTMENT: Your surgeon would like to see you in 10-14 days.     If this appointment has not been made for you prior to your departure from Outpatient Surgery, please call your surgeons

## 2023-05-22 NOTE — PROGRESS NOTES
General Surgery Daily Progress Note    Patient: Shantanu Ochoa MRN: [id number]  SSN: [id number]    YOB: 1934  Age: 80 y.o. Sex: female      Admit Date: 5/15/2023    POD 6 Days Post-Op    Procedure: Procedure(s):  RECURRENT  INCARCERATED VENTRAL HERNIA  REPAIR; EXTENSIVE LYSIS OF ADHESIONS;SMALL BOWEL RESECTION    Subjective:   No complaints  Want to go  Going to [facility]    Current Facility-Administered Medications   Medication Dose Route Frequency    dilTIAZem (CARDIZEM CD) extended release capsule 240 mg  240 mg Oral Daily    sennosides-docusate sodium (SENOKOT-S) 8.6-50 MG tablet 1 tablet  1 tablet Oral BID    bisacodyl (DULCOLAX) suppository 10 mg  10 mg Rectal Daily PRN    polyethylene glycol (GLYCOLAX) packet 17 g  17 g Oral BID PRN    apixaban (ELIQUIS) tablet 5 mg  5 mg Oral BID    labetalol (NORMODYNE) tablet 200 mg  200 mg Oral 2 times per day    phenylephrine (KIRSTIN-SYNEPHRINE) 0.5 % nasal spray 1 spray  1 spray Each Nostril Q6H PRN    levothyroxine (SYNTHROID) tablet 75 mcg  75 mcg Oral Daily    hydrALAZINE (APRESOLINE) injection 10 mg  10 mg IntraVENous Q6H PRN    morphine (PF) injection 2 mg  2 mg IntraVENous Q3H PRN    ondansetron (ZOFRAN) injection 4 mg  4 mg IntraVENous Q6H PRN        Objective:   No intake/output data recorded. 05/20 1901 - 05/22 0700  In: 640 [P.O.:240; I.V.:400]  Out: 1250 [Urine:1250]  Patient Vitals for the past 8 hrs:   BP Temp Temp src Pulse Resp SpO2   05/22/23 1055 (!) 153/87 98 °F (36.7 °C) Oral 88 20 96 %   05/22/23 0735 (!) 141/76 97.6 °F (36.4 °C) Oral 96 20 94 %   05/22/23 0700 -- -- -- 100 -- --       Physical Exam:  General: Alert, cooperative, NAD  Lungs: Unlabored  Abdomen: Soft, non-tender, non-distended. Incisions C/D/I. Woody Yuri    Extremities: Warm, moves all, no edema  Skin:  Warm and dry, no rash    Labs:   Recent Labs     05/21/23  0304   WBC 6.4   HGB 10.3*   HCT 31.0*        Recent Labs     05/21/23  0304      K 3.6   *   CO2 28

## 2023-05-24 LAB — ECHO BSA: 1.88 M2

## 2023-06-22 ENCOUNTER — APPOINTMENT (OUTPATIENT)
Facility: HOSPITAL | Age: 88
End: 2023-06-22
Payer: MEDICARE

## 2023-06-22 ENCOUNTER — HOSPITAL ENCOUNTER (EMERGENCY)
Facility: HOSPITAL | Age: 88
Discharge: HOME | End: 2023-06-22
Attending: EMERGENCY MEDICINE
Payer: MEDICARE

## 2023-06-22 ENCOUNTER — TELEPHONE (OUTPATIENT)
Age: 88
End: 2023-06-22

## 2023-06-22 VITALS
RESPIRATION RATE: 18 BRPM | OXYGEN SATURATION: 95 % | SYSTOLIC BLOOD PRESSURE: 151 MMHG | WEIGHT: 170 LBS | BODY MASS INDEX: 32.1 KG/M2 | HEIGHT: 61 IN | DIASTOLIC BLOOD PRESSURE: 75 MMHG | TEMPERATURE: 98.4 F | HEART RATE: 88 BPM

## 2023-06-22 DIAGNOSIS — L03.116 LEFT LEG CELLULITIS: Primary | ICD-10-CM

## 2023-06-22 DIAGNOSIS — T84.498A LOOSENING OF BONE FIXATION DEVICE, INITIAL ENCOUNTER (HCC): ICD-10-CM

## 2023-06-22 PROCEDURE — 10060 I&D ABSCESS SIMPLE/SINGLE: CPT

## 2023-06-22 PROCEDURE — 73562 X-RAY EXAM OF KNEE 3: CPT

## 2023-06-22 PROCEDURE — 99283 EMERGENCY DEPT VISIT LOW MDM: CPT

## 2023-06-22 PROCEDURE — 2500000003 HC RX 250 WO HCPCS: Performed by: EMERGENCY MEDICINE

## 2023-06-22 PROCEDURE — 73590 X-RAY EXAM OF LOWER LEG: CPT

## 2023-06-22 RX ORDER — DOXYCYCLINE HYCLATE 100 MG
100 TABLET ORAL 2 TIMES DAILY
Qty: 14 TABLET | Refills: 0 | Status: SHIPPED | OUTPATIENT
Start: 2023-06-22 | End: 2023-06-29

## 2023-06-22 RX ORDER — LIDOCAINE HYDROCHLORIDE AND EPINEPHRINE 15; 5 MG/ML; UG/ML
10 INJECTION, SOLUTION EPIDURAL ONCE
Status: COMPLETED | OUTPATIENT
Start: 2023-06-22 | End: 2023-06-22

## 2023-06-22 RX ORDER — ACETAMINOPHEN 500 MG
500 TABLET ORAL EVERY 6 HOURS PRN
COMMUNITY

## 2023-06-22 RX ADMIN — LIDOCAINE HYDROCHLORIDE,EPINEPHRINE BITARTRATE 10 ML: 15; .005 INJECTION, SOLUTION EPIDURAL; INFILTRATION; INTRACAUDAL; PERINEURAL at 11:53

## 2023-06-22 ASSESSMENT — ENCOUNTER SYMPTOMS
BLOOD IN STOOL: 0
ANAL BLEEDING: 0
SHORTNESS OF BREATH: 0
VOMITING: 0
DIARRHEA: 0
COUGH: 0
NAUSEA: 0
ABDOMINAL DISTENTION: 0
ABDOMINAL PAIN: 0

## 2023-06-22 ASSESSMENT — PAIN SCALES - GENERAL: PAINLEVEL_OUTOF10: 6

## 2023-06-22 ASSESSMENT — PAIN DESCRIPTION - ORIENTATION: ORIENTATION: LEFT

## 2023-06-22 ASSESSMENT — PAIN DESCRIPTION - LOCATION: LOCATION: KNEE;LEG

## 2023-06-22 ASSESSMENT — PAIN DESCRIPTION - FREQUENCY: FREQUENCY: CONTINUOUS

## 2023-06-22 NOTE — TELEPHONE ENCOUNTER
Gerard Stein from [facility] was calling that patient has refused PT this week, has large hematoma on L knee - says she hit it does not recall how - refusing about going to the doctor, telling her to give them a week and if its better than she can do PT in a week and if its not she will see a doctor   Chayito Ribeiro says that she will try again next week regardless

## 2023-06-22 NOTE — ED NOTES
Patients incision cleaned and bandaged. Cleaning instruction gone over with patient and family. Extra supplies sent home with patient.       Ann Olson RN  06/22/23 9166 [facility] 1192, RN  06/22/23 9714

## 2023-06-22 NOTE — ED NOTES
Pt DC from ER with  and daughter. Pt wheeled out in wheelchair by . Pt education and education oscar over with and all questions answered fully.       Parker Bai RN  06/22/23 TUTU Shen  06/22/23 4547

## 2023-06-22 NOTE — ED PROVIDER NOTES
Father           SOCIAL HISTORY       Social History     Socioeconomic History    Marital status:      Spouse name: None    Number of children: None    Years of education: None    Highest education level: None   Tobacco Use    Smoking status: Never    Smokeless tobacco: Never   Substance and Sexual Activity    Alcohol use: No    Drug use: No           PHYSICAL EXAM    (up to 7 for level 4, 8 or more for level 5)     ED Triage Vitals [06/22/23 1036]   BP Temp Temp Source Pulse Respirations SpO2 Height Weight - Scale   (!) 151/75 98.4 °F (36.9 °C) Oral 88 18 95 % 5' 1\" (1.549 m) 170 lb (77.1 kg)       Body mass index is 32.12 kg/m². Physical Exam  Constitutional:       General: She is not in acute distress. Appearance: Normal appearance. She is not ill-appearing, toxic-appearing or diaphoretic. HENT:      Head: Normocephalic. Mouth/Throat:      Mouth: Mucous membranes are moist.      Pharynx: Oropharynx is clear. No oropharyngeal exudate or posterior oropharyngeal erythema. Eyes:      General: No scleral icterus. Extraocular Movements: Extraocular movements intact. Conjunctiva/sclera: Conjunctivae normal.      Pupils: Pupils are equal, round, and reactive to light. Cardiovascular:      Rate and Rhythm: Normal rate and regular rhythm. Pulses: Normal pulses. Heart sounds: No murmur heard. No friction rub. Pulmonary:      Effort: Pulmonary effort is normal. No respiratory distress. Breath sounds: Normal breath sounds. No stridor. No wheezing, rhonchi or rales. Abdominal:      General: Abdomen is flat. There is no distension. Palpations: Abdomen is soft. Tenderness: There is no abdominal tenderness. There is no guarding or rebound. Musculoskeletal:         General: No swelling. Cervical back: Normal range of motion. No rigidity. Right lower leg: No edema. Left lower leg: No edema.       Comments: LLE erythema and edema w/ fluctuant mass left

## 2023-06-22 NOTE — ED TRIAGE NOTES
Pt arrived ambulatory with walker to ER with  and daughter. Pt walking slowly and taking frequent breaks from waiting room to ER room. Pt offered wheelchair several times and refused. Pt c/o pain in left knee and leg. Pt had hernia surgery in the end of May. Pt states she noticed leg a little over a week ago. Leg is shiny, taught, red and inflamed. Leg has a baseball size lump just below knee. Leg is painful to touch.

## 2023-06-22 NOTE — DISCHARGE INSTRUCTIONS
Your xrays showed a loosening of your orthopedic knee hardware. Please follow up with Dr Brissa Shaffer (orthopedics). We also started you on antibiotics for a skin infection for one week.

## 2023-07-03 ENCOUNTER — TELEPHONE (OUTPATIENT)
Age: 88
End: 2023-07-03

## 2023-07-03 RX ORDER — LABETALOL 200 MG/1
200 TABLET, FILM COATED ORAL 2 TIMES DAILY
Qty: 60 TABLET | Refills: 0 | OUTPATIENT
Start: 2023-07-03

## 2023-07-03 RX ORDER — DILTIAZEM HYDROCHLORIDE 240 MG/1
240 CAPSULE, COATED, EXTENDED RELEASE ORAL DAILY
Qty: 90 CAPSULE | Refills: 3 | Status: SHIPPED | OUTPATIENT
Start: 2023-07-03

## 2023-07-03 NOTE — TELEPHONE ENCOUNTER
Refill per VO of Dr. Matthew Buckley  Last appt: 6/12/2023    Future Appointments   Date Time Provider [address]   7/6/2023  2:30 PM Filemon Bettencourt MD [facility] BS AMB   10/26/2023 11:00 AM Una Trimble [facility]   2/15/2024  1:40 PM Ese Bauer MD [facility] BS AMB       Requested Prescriptions     Signed Prescriptions Disp Refills    apixaban (ELIQUIS) 5 MG TABS tablet 180 tablet 3     Sig: Take 1 tablet by mouth 2 times daily     Authorizing Provider: Ese Bauer     Ordering User: Óscar BARR    dilTIAZem (CARDIZEM CD) 240 MG extended release capsule 90 capsule 3     Sig: Take 1 capsule by mouth daily     Authorizing Provider: Ese Bauer     Ordering User: Snow Torres

## 2023-07-03 NOTE — TELEPHONE ENCOUNTER
Maurilio Hardy from [address] [place] [address] for dr: Patient has requested to be discharged from [facility] physical therapy; says she's walking okay and she is going to follow up with her orthopedic within the next few months.

## 2023-07-03 NOTE — TELEPHONE ENCOUNTER
Pt is calling because she needs a refill on Eliquis 5 mg and Diltiazem 240 mg Pharmacy is confirmed.     [phone]

## 2023-07-05 NOTE — PROGRESS NOTES
Frannie Lu (:  1934) is a 80 y.o. female,Established patient, here for evaluation of the following chief complaint(s):  Follow-up         ASSESSMENT/PLAN:  1. S/P repair of recurrent ventral hernia-has had follow-up since her hernia surgery and is healed and feeling better. This is resolved  2. Essential (primary) hypertension-at goal on labetalol  3. Acquired hypothyroidism-continue current dose of levothyroxine and last TSH was normal  4. Recurrent UTI-continue with Myrbetriq and Estrace cream-she did have some hematuria when she was at the skilled nursing facility after the hospital which may have been due to the Eliquis. She has a follow-up with urology coming up and will discuss this. Her scan was negative but she did not have a cystoscopy  5. Lymphedema, not elsewhere classified-continue with stockings and elevation of legs  6. PAF (paroxysmal atrial fibrillation) (HCC)-can continue with current dose of diltiazem and Eliquis  7. Left leg pain-she has a follow-up with Dr. Srikanth Hutson to discuss the swelling and discomfort in the left leg which they think may be due to a loose body from her knee replacement      No follow-ups on file. Healing    labetolol  Synthroid  Myrbetriq, estrace cream  Stockings  Dilt, eliquis  ? Subjective   SUBJECTIVE/OBJECTIVE:  HPI     Pt was admitted with incarcerated incisional recurrent ventral hernia. Taken to OR for open repair of incarcerated ventral hernia and had a small bowel resection. From surgical standpoint she recovered well and diet advanced after NGT taken out. She developed afib for which cardiologist was consulted and patient was started on dripp and transitioned to oral diltiazem and started on eliquis. PT determined best if patient go to SNF, so patient accepted at [facility]- dischared on      Arthralgias: Pt c/o aches and pains all over, which she does not take medication for. She uses a walker at all times.  Pt states that her neck

## 2023-07-06 ENCOUNTER — OFFICE VISIT (OUTPATIENT)
Age: 88
End: 2023-07-06
Payer: MEDICARE

## 2023-07-06 VITALS
SYSTOLIC BLOOD PRESSURE: 122 MMHG | RESPIRATION RATE: 14 BRPM | BODY MASS INDEX: 33.42 KG/M2 | HEART RATE: 66 BPM | TEMPERATURE: 97.4 F | OXYGEN SATURATION: 92 % | WEIGHT: 177 LBS | DIASTOLIC BLOOD PRESSURE: 79 MMHG | HEIGHT: 61 IN

## 2023-07-06 DIAGNOSIS — Z87.19 S/P REPAIR OF RECURRENT VENTRAL HERNIA: Primary | ICD-10-CM

## 2023-07-06 DIAGNOSIS — I10 ESSENTIAL (PRIMARY) HYPERTENSION: ICD-10-CM

## 2023-07-06 DIAGNOSIS — N39.0 RECURRENT UTI: ICD-10-CM

## 2023-07-06 DIAGNOSIS — I48.0 PAF (PAROXYSMAL ATRIAL FIBRILLATION) (HCC): ICD-10-CM

## 2023-07-06 DIAGNOSIS — M79.605 LEFT LEG PAIN: ICD-10-CM

## 2023-07-06 DIAGNOSIS — I89.0 LYMPHEDEMA, NOT ELSEWHERE CLASSIFIED: ICD-10-CM

## 2023-07-06 DIAGNOSIS — E03.9 ACQUIRED HYPOTHYROIDISM: ICD-10-CM

## 2023-07-06 DIAGNOSIS — Z98.890 S/P REPAIR OF RECURRENT VENTRAL HERNIA: Primary | ICD-10-CM

## 2023-07-06 PROCEDURE — 1123F ACP DISCUSS/DSCN MKR DOCD: CPT | Performed by: INTERNAL MEDICINE

## 2023-07-06 PROCEDURE — 1036F TOBACCO NON-USER: CPT | Performed by: INTERNAL MEDICINE

## 2023-07-06 PROCEDURE — 99214 OFFICE O/P EST MOD 30 MIN: CPT | Performed by: INTERNAL MEDICINE

## 2023-07-06 PROCEDURE — G8428 CUR MEDS NOT DOCUMENT: HCPCS | Performed by: INTERNAL MEDICINE

## 2023-07-06 PROCEDURE — 1090F PRES/ABSN URINE INCON ASSESS: CPT | Performed by: INTERNAL MEDICINE

## 2023-07-06 PROCEDURE — G8417 CALC BMI ABV UP PARAM F/U: HCPCS | Performed by: INTERNAL MEDICINE

## 2023-07-06 RX ORDER — FERROUS SULFATE 325(65) MG
325 TABLET ORAL
COMMUNITY

## 2023-07-06 ASSESSMENT — PATIENT HEALTH QUESTIONNAIRE - PHQ9
SUM OF ALL RESPONSES TO PHQ QUESTIONS 1-9: 0
SUM OF ALL RESPONSES TO PHQ9 QUESTIONS 1 & 2: 0
SUM OF ALL RESPONSES TO PHQ QUESTIONS 1-9: 0
2. FEELING DOWN, DEPRESSED OR HOPELESS: 0
SUM OF ALL RESPONSES TO PHQ QUESTIONS 1-9: 0
SUM OF ALL RESPONSES TO PHQ QUESTIONS 1-9: 0
1. LITTLE INTEREST OR PLEASURE IN DOING THINGS: 0

## 2023-07-28 ENCOUNTER — HOSPITAL ENCOUNTER (OUTPATIENT)
Facility: HOSPITAL | Age: 88
End: 2023-07-28
Attending: ORTHOPAEDIC SURGERY
Payer: MEDICARE

## 2023-07-28 DIAGNOSIS — M25.562 LEFT LATERAL KNEE PAIN: ICD-10-CM

## 2023-07-28 PROCEDURE — 73700 CT LOWER EXTREMITY W/O DYE: CPT

## 2023-08-01 ENCOUNTER — TELEPHONE (OUTPATIENT)
Age: 88
End: 2023-08-01

## 2023-08-01 NOTE — TELEPHONE ENCOUNTER
Routed last clinic note, ekg, eliquis instructions to [address] [place] [address] urology. Left  for Blas Dunbar asking for return call if anything else is needed.

## 2023-08-01 NOTE — TELEPHONE ENCOUNTER
Rosas Carr with [facility] called for cardiac clearance.  She needs last office notes, eloquis clearance and Gabino Small # [phone] ext [phone]  Fax# [phone]

## 2023-08-17 ENCOUNTER — HOSPITAL ENCOUNTER (OUTPATIENT)
Facility: HOSPITAL | Age: 88
Discharge: HOME | End: 2023-08-17
Payer: MEDICARE

## 2023-08-17 VITALS
SYSTOLIC BLOOD PRESSURE: 135 MMHG | RESPIRATION RATE: 16 BRPM | DIASTOLIC BLOOD PRESSURE: 66 MMHG | HEIGHT: 62 IN | TEMPERATURE: 97.5 F | WEIGHT: 177.1 LBS | OXYGEN SATURATION: 96 % | HEART RATE: 104 BPM | BODY MASS INDEX: 32.59 KG/M2

## 2023-08-17 LAB
ALBUMIN SERPL-MCNC: 3.3 G/DL (ref 3.5–5)
ALBUMIN/GLOB SERPL: 1.1 (ref 1.1–2.2)
ALP SERPL-CCNC: 79 U/L (ref 45–117)
ALT SERPL-CCNC: 12 U/L (ref 12–78)
ANION GAP SERPL CALC-SCNC: 5 MMOL/L (ref 5–15)
APPEARANCE UR: ABNORMAL
AST SERPL-CCNC: 11 U/L (ref 15–37)
BACTERIA URNS QL MICRO: ABNORMAL /HPF
BASOPHILS # BLD: 0.1 K/UL (ref 0–0.1)
BASOPHILS NFR BLD: 1 % (ref 0–1)
BILIRUB SERPL-MCNC: 0.7 MG/DL (ref 0.2–1)
BILIRUB UR QL CFM: NEGATIVE
BUN SERPL-MCNC: 25 MG/DL (ref 6–20)
BUN/CREAT SERPL: 19 (ref 12–20)
CALCIUM SERPL-MCNC: 8.8 MG/DL (ref 8.5–10.1)
CHLORIDE SERPL-SCNC: 112 MMOL/L (ref 97–108)
CO2 SERPL-SCNC: 25 MMOL/L (ref 21–32)
COLOR UR: ABNORMAL
CREAT SERPL-MCNC: 1.31 MG/DL (ref 0.55–1.02)
DIFFERENTIAL METHOD BLD: ABNORMAL
EOSINOPHIL # BLD: 0.1 K/UL (ref 0–0.4)
EOSINOPHIL NFR BLD: 2 % (ref 0–7)
EPITH CASTS URNS QL MICRO: ABNORMAL /LPF
ERYTHROCYTE [DISTWIDTH] IN BLOOD BY AUTOMATED COUNT: 15 % (ref 11.5–14.5)
GLOBULIN SER CALC-MCNC: 3 G/DL (ref 2–4)
GLUCOSE SERPL-MCNC: 89 MG/DL (ref 65–100)
GLUCOSE UR STRIP.AUTO-MCNC: NEGATIVE MG/DL
HCT VFR BLD AUTO: 31.7 % (ref 35–47)
HGB BLD-MCNC: 9.8 G/DL (ref 11.5–16)
HGB UR QL STRIP: ABNORMAL
IMM GRANULOCYTES # BLD AUTO: 0 K/UL (ref 0–0.04)
IMM GRANULOCYTES NFR BLD AUTO: 0 % (ref 0–0.5)
KETONES UR QL STRIP.AUTO: NEGATIVE MG/DL
LEUKOCYTE ESTERASE UR QL STRIP.AUTO: ABNORMAL
LYMPHOCYTES # BLD: 1 K/UL (ref 0.8–3.5)
LYMPHOCYTES NFR BLD: 21 % (ref 12–49)
MCH RBC QN AUTO: 29.7 PG (ref 26–34)
MCHC RBC AUTO-ENTMCNC: 30.9 G/DL (ref 30–36.5)
MCV RBC AUTO: 96.1 FL (ref 80–99)
MONOCYTES # BLD: 0.7 K/UL (ref 0–1)
MONOCYTES NFR BLD: 16 % (ref 5–13)
NEUTS SEG # BLD: 2.7 K/UL (ref 1.8–8)
NEUTS SEG NFR BLD: 60 % (ref 32–75)
NITRITE UR QL STRIP.AUTO: NEGATIVE
NRBC # BLD: 0 K/UL (ref 0–0.01)
NRBC BLD-RTO: 0 PER 100 WBC
PH UR STRIP: 5.5 (ref 5–8)
PLATELET # BLD AUTO: 232 K/UL (ref 150–400)
PMV BLD AUTO: 10.2 FL (ref 8.9–12.9)
POTASSIUM SERPL-SCNC: 3.7 MMOL/L (ref 3.5–5.1)
PROT SERPL-MCNC: 6.3 G/DL (ref 6.4–8.2)
PROT UR STRIP-MCNC: 100 MG/DL
RBC # BLD AUTO: 3.3 M/UL (ref 3.8–5.2)
RBC #/AREA URNS HPF: >100 /HPF (ref 0–5)
SODIUM SERPL-SCNC: 142 MMOL/L (ref 136–145)
SP GR UR REFRACTOMETRY: 1.02 (ref 1–1.03)
URINE CULTURE IF INDICATED: ABNORMAL
UROBILINOGEN UR QL STRIP.AUTO: 0.2 EU/DL (ref 0.2–1)
WBC # BLD AUTO: 4.6 K/UL (ref 3.6–11)
WBC URNS QL MICRO: ABNORMAL /HPF (ref 0–4)
YEAST URNS QL MICRO: PRESENT

## 2023-08-17 PROCEDURE — 85025 COMPLETE CBC W/AUTO DIFF WBC: CPT

## 2023-08-17 PROCEDURE — 36415 COLL VENOUS BLD VENIPUNCTURE: CPT

## 2023-08-17 PROCEDURE — 87086 URINE CULTURE/COLONY COUNT: CPT

## 2023-08-17 PROCEDURE — 81001 URINALYSIS AUTO W/SCOPE: CPT

## 2023-08-17 PROCEDURE — 80053 COMPREHEN METABOLIC PANEL: CPT

## 2023-08-17 PROCEDURE — 93005 ELECTROCARDIOGRAM TRACING: CPT | Performed by: UROLOGY

## 2023-08-17 ASSESSMENT — ENCOUNTER SYMPTOMS
NAUSEA: 0
SORE THROAT: 0
ABDOMINAL PAIN: 0
BLOOD IN STOOL: 0
WHEEZING: 0
TROUBLE SWALLOWING: 0
VOMITING: 0
SHORTNESS OF BREATH: 0
COUGH: 0

## 2023-08-17 NOTE — H&P
Margarita Michel was referred for evaluation by:Dr. Sharon Silveira for Pre- Op Evaluation. Please see encounter details and orders for consultative summary. Type of surgery : Cystoscopy with Bladder biopsy  Surgery site : Bladder  Anesthesia type: General  Date of procedure:  9/7/2023    This 80y.o. year old female presents with complaints of persistent hematuria and recurrent UTIs. Reports she had cystoscopy under local in urology office with unclear findings. Conservative measures have been exhausted prior to the decision for surgery. Patient has discussed the risks, alternatives, and benefits of the surgery with surgeon and has elected to proceed with surgical intervention. History of newly discovered Atrial fibrillation since 5/2023 and is followed by Cardiology Dr Lilo Robert. On Eliquis twice daily. Denies chest pain, shortness of breath, lightheadedness. Has received instructions to hold her Eliquis for 2 days prior to upcoming surgery. Allergies:    Allergies   Allergen Reactions    Adhesive Tape Hives and Rash    Levofloxacin Hives     Pain in the Parks's tendon area    Sulfa Antibiotics Other (See Comments) and Rash     Latex allergy: no  Prior reactions to anesthesia:  slow to awaken; several days of confusion after GI surgery 5/2023     Current Outpatient Medications   Medication Sig    ferrous sulfate (IRON 325) 325 (65 Fe) MG tablet Take 1 tablet by mouth daily (with breakfast)    apixaban (ELIQUIS) 5 MG TABS tablet Take 1 tablet by mouth 2 times daily    dilTIAZem (CARDIZEM CD) 240 MG extended release capsule Take 1 capsule by mouth daily (Patient taking differently: Take 1 capsule by mouth every morning)    mirabegron (MYRBETRIQ) 25 MG TB24 Take 2 tablets by mouth nightly    estradiol (ESTRACE) 0.1 MG/GM vaginal cream Place 2 g vaginally daily    famotidine (PEPCID) 20 MG tablet Take 1 tablet by mouth 2 times daily    labetalol (NORMODYNE) 200 MG tablet Take 1 tablet by mouth 2 times daily

## 2023-08-17 NOTE — PERIOP NOTE
Pt here with daughter for PAT appt. Cardiac clearance requested by Lefty Ribera NP as pt newly diagnosed afib on eloquis to stop 2 days prior to surgery per cardiology note from Dr Donna Fuentes.

## 2023-08-18 LAB
BACTERIA SPEC CULT: NORMAL
CC UR VC: NORMAL
EKG ATRIAL RATE: 326 BPM
EKG DIAGNOSIS: NORMAL
EKG P AXIS: 81 DEGREES
EKG Q-T INTERVAL: 364 MS
EKG QRS DURATION: 94 MS
EKG QTC CALCULATION (BAZETT): 372 MS
EKG R AXIS: -35 DEGREES
EKG T AXIS: 39 DEGREES
EKG VENTRICULAR RATE: 63 BPM
SERVICE CMNT-IMP: NORMAL

## 2023-08-18 PROCEDURE — 93010 ELECTROCARDIOGRAM REPORT: CPT | Performed by: SPECIALIST

## 2023-08-18 NOTE — PERIOP NOTE
Surgery Date changed from 8/29/23 to 9/7/23.   Updated Orders scanned to Medication, and Update also fax'd to Pharmacy

## 2023-08-18 NOTE — PERIOP NOTE
Cardiology office visit note from 6/12/23 amended by Dr Linda Curiel to include cardiac clearance and Eliquis instructions for upcoming Cystoscopy surgery with Dr Agustin Xie on 9/7/23.

## 2023-08-26 ENCOUNTER — ANESTHESIA EVENT (OUTPATIENT)
Facility: HOSPITAL | Age: 88
End: 2023-08-26
Payer: MEDICARE

## 2023-09-04 RX ORDER — LABETALOL 200 MG/1
TABLET, FILM COATED ORAL
Qty: 180 TABLET | Refills: 0 | Status: SHIPPED | OUTPATIENT
Start: 2023-09-04

## 2023-09-07 ENCOUNTER — HOSPITAL ENCOUNTER (OUTPATIENT)
Facility: HOSPITAL | Age: 88
Setting detail: OUTPATIENT SURGERY
Discharge: HOME | End: 2023-09-07
Attending: UROLOGY | Admitting: UROLOGY
Payer: MEDICARE

## 2023-09-07 ENCOUNTER — ANESTHESIA (OUTPATIENT)
Facility: HOSPITAL | Age: 88
End: 2023-09-07
Payer: MEDICARE

## 2023-09-07 VITALS
SYSTOLIC BLOOD PRESSURE: 110 MMHG | RESPIRATION RATE: 16 BRPM | TEMPERATURE: 98 F | HEART RATE: 54 BPM | OXYGEN SATURATION: 98 % | DIASTOLIC BLOOD PRESSURE: 62 MMHG

## 2023-09-07 PROCEDURE — 6360000002 HC RX W HCPCS

## 2023-09-07 PROCEDURE — 2709999900 HC NON-CHARGEABLE SUPPLY: Performed by: UROLOGY

## 2023-09-07 PROCEDURE — 2500000003 HC RX 250 WO HCPCS

## 2023-09-07 PROCEDURE — 2580000003 HC RX 258: Performed by: ANESTHESIOLOGY

## 2023-09-07 PROCEDURE — 2580000003 HC RX 258: Performed by: UROLOGY

## 2023-09-07 PROCEDURE — 3600000012 HC SURGERY LEVEL 2 ADDTL 15MIN: Performed by: UROLOGY

## 2023-09-07 PROCEDURE — 87086 URINE CULTURE/COLONY COUNT: CPT

## 2023-09-07 PROCEDURE — 88305 TISSUE EXAM BY PATHOLOGIST: CPT

## 2023-09-07 PROCEDURE — 7100000001 HC PACU RECOVERY - ADDTL 15 MIN: Performed by: UROLOGY

## 2023-09-07 PROCEDURE — 3700000001 HC ADD 15 MINUTES (ANESTHESIA): Performed by: UROLOGY

## 2023-09-07 PROCEDURE — 6360000002 HC RX W HCPCS: Performed by: UROLOGY

## 2023-09-07 PROCEDURE — 3600000002 HC SURGERY LEVEL 2 BASE: Performed by: UROLOGY

## 2023-09-07 PROCEDURE — 7100000000 HC PACU RECOVERY - FIRST 15 MIN: Performed by: UROLOGY

## 2023-09-07 PROCEDURE — 3700000000 HC ANESTHESIA ATTENDED CARE: Performed by: UROLOGY

## 2023-09-07 PROCEDURE — 7100000010 HC PHASE II RECOVERY - FIRST 15 MIN: Performed by: UROLOGY

## 2023-09-07 RX ORDER — DIPHENHYDRAMINE HYDROCHLORIDE 50 MG/ML
12.5 INJECTION INTRAMUSCULAR; INTRAVENOUS
Status: DISCONTINUED | OUTPATIENT
Start: 2023-09-07 | End: 2023-09-07 | Stop reason: HOSPADM

## 2023-09-07 RX ORDER — FENTANYL CITRATE 50 UG/ML
INJECTION, SOLUTION INTRAMUSCULAR; INTRAVENOUS PRN
Status: DISCONTINUED | OUTPATIENT
Start: 2023-09-07 | End: 2023-09-07 | Stop reason: SDUPTHER

## 2023-09-07 RX ORDER — FENTANYL CITRATE 50 UG/ML
100 INJECTION, SOLUTION INTRAMUSCULAR; INTRAVENOUS
Status: DISCONTINUED | OUTPATIENT
Start: 2023-09-07 | End: 2023-09-07 | Stop reason: HOSPADM

## 2023-09-07 RX ORDER — PROPOFOL 10 MG/ML
INJECTION, EMULSION INTRAVENOUS CONTINUOUS PRN
Status: DISCONTINUED | OUTPATIENT
Start: 2023-09-07 | End: 2023-09-07 | Stop reason: SDUPTHER

## 2023-09-07 RX ORDER — MIDAZOLAM HYDROCHLORIDE 2 MG/2ML
2 INJECTION, SOLUTION INTRAMUSCULAR; INTRAVENOUS
Status: DISCONTINUED | OUTPATIENT
Start: 2023-09-07 | End: 2023-09-07 | Stop reason: HOSPADM

## 2023-09-07 RX ORDER — EPHEDRINE SULFATE/0.9% NACL/PF 50 MG/5 ML
SYRINGE (ML) INTRAVENOUS PRN
Status: DISCONTINUED | OUTPATIENT
Start: 2023-09-07 | End: 2023-09-07 | Stop reason: SDUPTHER

## 2023-09-07 RX ORDER — LIDOCAINE HYDROCHLORIDE 20 MG/ML
INJECTION, SOLUTION EPIDURAL; INFILTRATION; INTRACAUDAL; PERINEURAL PRN
Status: DISCONTINUED | OUTPATIENT
Start: 2023-09-07 | End: 2023-09-07 | Stop reason: SDUPTHER

## 2023-09-07 RX ORDER — SODIUM CHLORIDE, SODIUM LACTATE, POTASSIUM CHLORIDE, CALCIUM CHLORIDE 600; 310; 30; 20 MG/100ML; MG/100ML; MG/100ML; MG/100ML
INJECTION, SOLUTION INTRAVENOUS CONTINUOUS
Status: DISCONTINUED | OUTPATIENT
Start: 2023-09-07 | End: 2023-09-07 | Stop reason: HOSPADM

## 2023-09-07 RX ORDER — ONDANSETRON 2 MG/ML
4 INJECTION INTRAMUSCULAR; INTRAVENOUS
Status: DISCONTINUED | OUTPATIENT
Start: 2023-09-07 | End: 2023-09-07 | Stop reason: HOSPADM

## 2023-09-07 RX ORDER — LIDOCAINE HYDROCHLORIDE 10 MG/ML
1 INJECTION, SOLUTION EPIDURAL; INFILTRATION; INTRACAUDAL; PERINEURAL
Status: DISCONTINUED | OUTPATIENT
Start: 2023-09-07 | End: 2023-09-07 | Stop reason: HOSPADM

## 2023-09-07 RX ADMIN — FENTANYL CITRATE 25 MCG: 50 INJECTION, SOLUTION INTRAMUSCULAR; INTRAVENOUS at 08:30

## 2023-09-07 RX ADMIN — PROPOFOL 100 MCG/KG/MIN: 10 INJECTION, EMULSION INTRAVENOUS at 08:36

## 2023-09-07 RX ADMIN — CEFAZOLIN SODIUM 2000 MG: 1 POWDER, FOR SOLUTION INTRAMUSCULAR; INTRAVENOUS at 08:38

## 2023-09-07 RX ADMIN — SODIUM CHLORIDE, POTASSIUM CHLORIDE, SODIUM LACTATE AND CALCIUM CHLORIDE: 600; 310; 30; 20 INJECTION, SOLUTION INTRAVENOUS at 07:02

## 2023-09-07 RX ADMIN — LIDOCAINE HYDROCHLORIDE 100 MG: 20 INJECTION, SOLUTION EPIDURAL; INFILTRATION; INTRACAUDAL; PERINEURAL at 08:36

## 2023-09-07 RX ADMIN — Medication 10 MG: at 09:04

## 2023-09-07 RX ADMIN — FENTANYL CITRATE 25 MCG: 50 INJECTION, SOLUTION INTRAMUSCULAR; INTRAVENOUS at 08:40

## 2023-09-07 RX ADMIN — Medication 10 MG: at 08:52

## 2023-09-07 RX ADMIN — Medication 10 MG: at 08:58

## 2023-09-07 RX ADMIN — FENTANYL CITRATE 25 MCG: 50 INJECTION, SOLUTION INTRAMUSCULAR; INTRAVENOUS at 08:36

## 2023-09-07 ASSESSMENT — PAIN - FUNCTIONAL ASSESSMENT: PAIN_FUNCTIONAL_ASSESSMENT: 0-10

## 2023-09-07 NOTE — ANESTHESIA POSTPROCEDURE EVALUATION
Department of Anesthesiology  Postprocedure Note    Patient: Caio Grimm  MRN: [id number]  YOB: 1934  Date of evaluation: 9/7/2023      Procedure Summary     Date: 09/07/23 Room / Location: [facility] MAIN OR 6 / [facility] MAIN OR    Anesthesia Start: 0830 Anesthesia Stop: 9205    Procedure: CYSTOSCOPY BLADDER BIOPSY Diagnosis:       Lesion of bladder      (Lesion of bladder [N32.9])    Surgeons: Kylah Reyes MD Responsible Provider: Brie Kelley MD    Anesthesia Type: MAC ASA Status: 3          Anesthesia Type: MAC    Rama Phase I: Rama Score: 8    Rama Phase II:        Anesthesia Post Evaluation    Patient location during evaluation: bedside  Airway patency: patent  Nausea & Vomiting: no nausea and no vomiting  Complications: no  Cardiovascular status: hemodynamically stable  Respiratory status: acceptable  Comments: VITALS REVIEWED

## 2023-09-07 NOTE — PERIOP NOTE
Dr. Kanu rFias Dear in for re-eval. Patient okay to be discharged at this time per Dr. Kanu Frias Dear.

## 2023-09-07 NOTE — H&P
Department of Urology  Attending History and Physical        CHIEF COMPLAINT:  UTI with concerning area on cystoscopy      HISTORY OF PRESENT ILLNESS:      The patient is a 80 y.o. female with significant past medical history of recurrent UTI and microhematuria. Recent cystoscopy with possible mucosal abnormality and CT with asymetric wall thickening.     Past Medical History:        Diagnosis Date    Anesthesia complication     confusion, slow to arouse    Arthritis     Atrial fibrillation ([address])     discovered 5/15/23    Cataract     bilateral eyes    Frequent urinary tract infections     GERD (gastroesophageal reflux disease)     esophageal strictures    H/O ventral hernia repair     H/O: hysterectomy     Hypertension     Melanoma ([address])     leg    Thyroid disease      Past Surgical History:        Procedure Laterality Date    CATARACT REMOVAL Bilateral     CHOLECYSTECTOMY      COLONOSCOPY      CYSTOSCOPY      ELBOW SURGERY Left     ORIF left elbow    ENDOSCOPY, COLON, DIAGNOSTIC      HERNIA REPAIR      x2    HYSTERECTOMY (CERVIX STATUS UNKNOWN)      MOHS SURGERY      OTHER SURGICAL HISTORY      multiple excision of skin cancer    TONSILLECTOMY      TOTAL KNEE ARTHROPLASTY Bilateral     VENTRAL HERNIA REPAIR N/A 05/16/2023    RECURRENT  INCARCERATED VENTRAL HERNIA  REPAIR; EXTENSIVE LYSIS OF ADHESIONS;SMALL BOWEL [facility]:   Medications Prior to Admission: labetalol (NORMODYNE) 200 MG tablet, Take 1 tablet by mouth twice daily  ferrous sulfate (IRON 325) 325 (65 Fe) MG tablet, Take 1 tablet by mouth daily (with breakfast)  apixaban (ELIQUIS) 5 MG TABS tablet, Take 1 tablet by mouth 2 times daily  dilTIAZem (CARDIZEM CD) 240 MG extended release capsule, Take 1 capsule by mouth daily (Patient taking differently: Take 1 capsule by mouth every morning)  mirabegron (MYRBETRIQ) 25 MG TB24, Take 2 tablets by mouth nightly  estradiol

## 2023-09-07 NOTE — BRIEF OP NOTE
Brief Postoperative Note      Patient: Hilda Liang  YOB: 1934  MRN: [id number]    Date of Procedure: 9/7/2023    Pre-Op Diagnosis Codes:     * Lesion of bladder [N32.9]    Post-Op Diagnosis: Same       Procedure(s):  CYSTOSCOPY BLADDER BIOPSY    Surgeon(s):  Vinicio Quevedo MD    Assistant:  * No surgical staff found *    Anesthesia: General    Estimated Blood Loss (mL): Minimal    Complications: None    Specimens:   ID Type Source Tests Collected by Time Destination   1 :  Urine Bladder CULTURE, URINE Vinicio Quevedo MD 9/7/2023 8541    A : Right Posterior Wall Bladder Biopsy Tissue Bladder SURGICAL PATHOLOGY Vinicio Quevedo MD 9/7/2023 3792    B : Posterior wall Bladder Biopsy Tissue Bladder SURGICAL PATHOLOGY Vinicio Quevedo MD 9/7/2023 0864        Implants:  * No implants in log *      Drains: * No LDAs found *    Findings: Grossly purulent urine on drainage. Midline cystocele/pocket. Diffuse bladder wall erythema, somewhat more prominent on R and posterior wall. Squamous metaplasia at trigone. NO obvious tumor.       Electronically signed by Melody Pastor MD on 9/7/2023 at 9:07 AM

## 2023-09-07 NOTE — PERIOP NOTE
Dr. Brandon Orta to beside for eval. Per Dr. Brandon Orta, patient to remain in PACU for further monitoring. Patient tolerating liquids, and is A & O x 4. Diet to advance to crackers per Dr. Bassam wu.

## 2023-09-07 NOTE — DISCHARGE INSTRUCTIONS
No restrictions. DISCHARGE SUMMARY from your Nurse      PATIENT INSTRUCTIONS    After general anesthesia or intravenous sedation, for 24 hours or while taking prescription Narcotics:  Limit your activities  Do not drive and operate hazardous machinery  Do not make important personal or business decisions  Do  not drink alcoholic beverages  If you have not urinated within 8 hours after discharge, please contact your surgeon on call. Report the following to your surgeon:  Excessive pain, swelling, redness or odor of or around the surgical area  Temperature over 100.5  Nausea and vomiting lasting longer than 4 hours or if unable to take medications  Any signs of decreased circulation or nerve impairment to extremity: change in color, persistent  numbness, tingling, coldness or increase pain  Any questions      GOOD HELP TO FIGHT AN INFECTION  Here are a few tip to help reduce the chance of getting an infection after surgery:  Wash Your Hands  Good handwashing is the most important thing you and your caregiver can do. Wash before and after caring for any wounds. Dry your hand with a clean towel. Wash with soap and water for at least 20 seconds. A TIP: sing the \"Happy Birthday\" song through one time while washing to help with the timing. Use a hand  in between washings. Shower  When your surgeon says it is OK to take a shower, use a new bar of antibacterial soap (if that is what you use, and keep that bar of soap ONLY for your use), or antibacterial body wash. Use a clean wash cloth or sponge when you bathe. Dry off with a clean towel  after every bath - be careful around any wounds, skin staples, sutures or surgical glue over/on wounds. Do not enter swimming pools, hot tubs, lakes, rivers and/or ocean until wounds are healed and your doctor/surgeon says it is OK. Use Clean Sheets  Sleep on freshly laundered sheets after your surgery.   Keep the surgery site covered with a clean, dry bandage

## 2023-09-08 NOTE — OP NOTE
[address] [place] [address]  OPERATIVE REPORT    Name:  Ilan Honeycutt  MR#:  [id number]  :  1934  ACCOUNT #:  [de-identified]  DATE OF SERVICE:  2023    PREOPERATIVE DIAGNOSES:  Possible bladder mass, recurrent urinary tract infections, and hematuria. POSTOPERATIVE DIAGNOSES:  Possible bladder mass, recurrent urinary tract infections, and hematuria. PROCEDURE PERFORMED:  Cystoscopy with bladder biopsy. SURGEON:  Sabrina Merrill MD    ASSISTANT:  None. ANESTHESIA:  General.    COMPLICATIONS:  None. SPECIMENS REMOVED:  Urine for culture and posterior wall bladder biopsies. IMPLANTS:  ***. ESTIMATED BLOOD LOSS:  None. DRAINS:  None. DISPOSITION:  PACU. FINDINGS:  1.  Grossly purulent urine draining from bladder. 2.  Mild midline cystocele, pocket like, with no obvious connection outside the bladder. No waldemar tumor within the bladder. There was some degree of asymmetric erythema worsening on the right side and posterior bladder wall. 3.  Squamous metaplasia of the trigone. CHIEF COMPLAINT/REASON FOR PROCEDURE:  This is an 29-year-old female who *** Dr. Ana Torres. She has had difficulty with recurrent urinary tract infection for quite some time. Recently, she had imaging, which showed some asymmetric thickening on CT. Cystoscopy showed erythema worse on the right than the left. It had taken some time to get her scheduled. Recommended she proceed with cystoscopy under anesthesia for possible biopsy. Risks and benefits were discussed with Dr. Ana Torres and I have reviewed this with the patient and her family preoperatively. PROCEDURE:  The patient was taken to the operating room and placed supine on the operating table. General anesthesia was established. She was prepped and draped in the usual sterile fashion in the lithotomy position. A 21-Belizean cystoscope was inserted transurethrally towards the patient's bladder.   Grossly purulent urine was drained from

## 2023-09-10 LAB
BACTERIA SPEC CULT: ABNORMAL
CC UR VC: ABNORMAL
SERVICE CMNT-IMP: ABNORMAL

## 2023-09-19 ENCOUNTER — TELEPHONE (OUTPATIENT)
Age: 88
End: 2023-09-19

## 2023-09-19 RX ORDER — FUROSEMIDE 20 MG/1
20 TABLET ORAL DAILY
Qty: 10 TABLET | Refills: 0 | Status: SHIPPED | OUTPATIENT
Start: 2023-09-19

## 2023-09-19 NOTE — TELEPHONE ENCOUNTER
We can try lasix 20 mg every other day - but we will need to make sure she doesn't get too dehydrated

## 2023-09-19 NOTE — TELEPHONE ENCOUNTER
Eloy Matters the patient daughter is requesting a call back to confirm if the doctor can prescribe the patient medication for her swollen legs. She stated that the patient Legs been swollen before and the doctor gave her a Diuretic to get the swelling down.     [address] [facility], [address] [place] [address] [phone] - F [phone]

## 2023-10-18 DIAGNOSIS — I89.0 LYMPHEDEMA, NOT ELSEWHERE CLASSIFIED: Primary | ICD-10-CM

## 2023-10-25 ENCOUNTER — TELEPHONE (OUTPATIENT)
Age: 88
End: 2023-10-25

## 2023-10-25 RX ORDER — FUROSEMIDE 20 MG/1
20 TABLET ORAL DAILY
Qty: 10 TABLET | Refills: 0 | Status: SHIPPED | OUTPATIENT
Start: 2023-10-25

## 2023-10-25 NOTE — TELEPHONE ENCOUNTER
Medication refill    furosemide (LASIX) 20 MG tablet    [address] [phone] - F [phone]

## 2023-10-26 ENCOUNTER — HOSPITAL ENCOUNTER (OUTPATIENT)
Facility: HOSPITAL | Age: 88
Setting detail: RECURRING SERIES
Discharge: HOME | End: 2023-10-29
Payer: MEDICARE

## 2023-10-26 PROCEDURE — 97140 MANUAL THERAPY 1/> REGIONS: CPT

## 2023-10-26 PROCEDURE — 97162 PT EVAL MOD COMPLEX 30 MIN: CPT

## 2023-10-26 PROCEDURE — 97760 ORTHOTIC MGMT&TRAING 1ST ENC: CPT

## 2023-10-26 NOTE — THERAPY EVALUATION
[facility]   a part of [facility]  [address] [facility], [address] [place] [address]   Jona Craig                                                    [facility]:[phone]   [facility]:[phone]                                                                                 PHYSICAL THERAPY LYMPHEDEMA - MEDICARE EVALUATION/PLAN OF CARE NOTE (updated 3/23)      Date: 10/26/2023          Patient Name:  Deniz James :  1934   Medical   Diagnosis:  Lymphedema, not elsewhere classified [I89.0] Treatment Diagnosis:  I89.0     Lymphedema, not elsewhere classified, R26.89   Other abnormalities of gait and mobility, and R60.9     Edema, unspecified    Referral Source:  Lexie Goldmann, MD Provider #:  [id number]                Insurance: Payor: MEDICARE / Plan: MEDICARE PART A AND B / Product Type: *No Product type* /        Patient  verified yes     Visit #   Current  / Total 1    Time   In / Out 1110 1224   Total Treatment Time 74   Total Timed Codes 74   1:1 Treatment Time 76      [facility] Totals Reminder:  bill using total billable   min of TIMED therapeutic procedures and modalities. 8-22 min = 1 unit; 23-37 min = 2 units; 38-52 min = 3 units;  53-67 min = 4 units; 68-82 min = 5 units         SUBJECTIVE  Pain Level (0-10 scale): 4/10, LE heaviness, tightness   [x]constant []intermittent []improving []worsening []no change since onset    Any medication changes, allergies to medications, adverse drug reactions, diagnosis change, or new procedure performed?: [x] No    [] Yes (see summary sheet for update)  Medications: Verified on Patient Summary List    Subjective functional status/changes:     \" My legs have never looked this bad. \" Patient returns for evaluation and treatment of bilateral LE lymphedema. Patient reports that, prior to 1 month ago, she was wearing her \"old compression stockings,\" stating she did not wear new stockings that were fit in clinic 2023.  Discontinued wear of
is a progressive and chronic condition. It may cause acute infections, muscle atrophy, discomfort, and connective tissue fibrosis with irreversible tissue damage, decreased ROM in the affected limb and diminished functional mobility possibly interfering with independence and ability to work. Recurrent infections and wound complications that commonly occur with Lymphedema often require hospitalization and extensive wound care, thus increasing medical costs. The patient has received complete decongestive therapy which includes manual lymphatic drainage, lymphedema specific exercises, compression bandaging, and hygiene/skin care. Goals of therapy are to reduce the edema and prevent re-accumulation of fluid with its complications. It is medically necessary for this patient to have daytime garments to control this condition. They will need 2 sets (one set to wear and one set to wash for adequate skin care and wearing time). Garments must be replaced every 4-6 months for effectiveness. There are no substitutes available that offer acceptable compression treatment for this Lymphedema patient.     If further information is requested, please contact our certified lymphedema therapists at [phone].    [] Parag Rojo, OTR, CLT                  [] Scott Call, PT, DPT, CLT-LINO  [] Coleen Mccoy, PT, DPT, CLT-LINO [] Levi Beck, PT, DPT, CLT  [x] Joselin Hogan, PT, [facility]        Printed  Provider Name  Provider Alphonsa Krabbe, MD Date    Provider NPI   [id number]

## 2023-12-04 RX ORDER — LEVOTHYROXINE SODIUM 0.07 MG/1
TABLET ORAL
Qty: 90 TABLET | Refills: 0 | Status: SHIPPED | OUTPATIENT
Start: 2023-12-04

## 2023-12-11 ENCOUNTER — APPOINTMENT (OUTPATIENT)
Facility: HOSPITAL | Age: 88
End: 2023-12-11
Payer: MEDICARE

## 2023-12-14 ENCOUNTER — APPOINTMENT (OUTPATIENT)
Facility: HOSPITAL | Age: 88
End: 2023-12-14
Payer: MEDICARE

## 2023-12-21 ENCOUNTER — HOSPITAL ENCOUNTER (OUTPATIENT)
Facility: HOSPITAL | Age: 88
Setting detail: RECURRING SERIES
Discharge: HOME | End: 2023-12-24
Payer: MEDICARE

## 2023-12-21 PROCEDURE — 97140 MANUAL THERAPY 1/> REGIONS: CPT

## 2023-12-21 NOTE — PROGRESS NOTES
instruct in home and community integration, instruct in home lymphedema management program, measure for compression products, and modify and progress therapeutic interventions to address functional deficits and attain remaining goals. Progress toward goals / Updated goals:  [x]  See Progress Note/Re certification    Short Term Goals: To be accomplished in 12 treatments. Patient will demonstrate decreased volumetric measurements by 300-400 mL each LE in order to reduce risk for infection, decrease feeling of limb heaviness, to reduce fall risk. 2.   Patient will report bilateral LE extremity pain decrease from 4/10 to 2/10/10 during ambulation house hold distances with Rolator walker. 3.   Patient to perform 5/5 lymphedema remedial exercises in session with modified independence utilizing HEP handout, in order to promote optimal independence with management of condition,  as well as promote optimal limb volume reduction required for proper fit of donned clothing in 6 weeks. 4.   Patient/Caregiver to verbalize 3/3 signs and symptoms of infection without external cueing, in order to promote optimal self-management of condition in 6 weeks. Long Term Goals: To be accomplished in 24 treatments. Patient/caregiver will demonstrate improved edema management as evidenced by performing donning/doffing of garments modified independent 3/3 times within session to aid in reducing risk for infection and promote transition to maintenance phase of CDT in 12 weeks. 2.   Patient will demonstrate decrease in self-perceived functional impairment as evidenced by improved score on Lymphedema Life Impact Scale outcome measure from 63 % impairment to 48 % impairment within 12 weeks. PLAN  Yes  Continue plan of care  Re-Cert Due:  29/88/90 to 1/21/24  PN DUE: 1/16/24    []  Upgrade activities as tolerated  []  Discharge due to :  [x]  Other: 1. Re-assess donaldo to B/L LE MLB 2.  Add toe wraps as indicated    Traci Nixon

## 2023-12-27 ENCOUNTER — HOSPITAL ENCOUNTER (OUTPATIENT)
Facility: HOSPITAL | Age: 88
Setting detail: RECURRING SERIES
Discharge: HOME | End: 2023-12-30
Payer: MEDICARE

## 2023-12-27 PROCEDURE — 97140 MANUAL THERAPY 1/> REGIONS: CPT

## 2023-12-27 PROCEDURE — 97597 DBRDMT OPN WND 1ST 20 CM/<: CPT

## 2023-12-27 NOTE — PROGRESS NOTES
Therapy (timed):  decrease pain, increase ROM, increase tissue extensibility, and decrease edema to improve patient's ability to progress to PLOF and address remaining functional goals. The manual therapy interventions were performed at a separate and distinct time from the therapeutic activities interventions . (see flow sheet as applicable)    Details if applicable:    Manual Lymphatic Drainage (MLD):  Area to decongest: LE Bilateral, foot, ankle, calf, and mid knee   Sequence used and effectiveness:  Secondary sequence for lower extremities without trunk involvement. Applied low pH lotion with upward strokes. Skin/wound care/debridement: Reviewed skin care principles:   Performed skin care with low pH lotion following manual lymph principles. , Skin care products. , Hygiene., Prevention of cellulitis. , and Wound care    Lac hydrin + Eucerin LE's. Applied multi-layer compression bandaging to: Patient arrived without compression on. Bandage supplies and Velcro shoes in hand. The following multi-layer bandages were applied:  Protouch Stockinette    Padding layer:  Cellona and Fleece (bulk out ankle)    Foam:  10 cm roll  Grey foam kidney: medial R/L    Short stretch bandages:   6 cm, 8 cm, and 10 cm    Spiral technique with mild to moderate tension. Tubi  and non skid socks to cover bandages. Applied Velcro shoe. Added Velcro to be able to fasten across dorsum of foot. Gait trial with Rollator and patient able to ambulate safely on level surface mod I. No foot drag, shoes snug. Pt instructed regarding home management of compression bandaging as follows: Daughter present. Offered to teach family to learn MLB. Patient declines. Compression bandaging will be applied twice a week by therapist in clinic, with adjustments to be made at home as indicated if bandaging becomes loose or uncomfortable.      If tolerated, remain bandaged between appointments with therapist, removing under the following

## 2023-12-28 ENCOUNTER — APPOINTMENT (OUTPATIENT)
Facility: HOSPITAL | Age: 88
End: 2023-12-28
Payer: MEDICARE

## 2024-01-03 ENCOUNTER — HOSPITAL ENCOUNTER (OUTPATIENT)
Facility: HOSPITAL | Age: 89
Setting detail: RECURRING SERIES
Discharge: HOME | End: 2024-01-06
Payer: MEDICARE

## 2024-01-03 PROCEDURE — 97140 MANUAL THERAPY 1/> REGIONS: CPT

## 2024-01-03 PROCEDURE — 97760 ORTHOTIC MGMT&TRAING 1ST ENC: CPT

## 2024-01-03 NOTE — PROGRESS NOTES
decreased volumetric measurements by 300-400 mL each LE in order to reduce risk for infection, decrease feeling of limb heaviness, to reduce fall risk. Ongoing   2.   Patient will report bilateral LE extremity pain decrease from 4/10 to 2/10 during ambulation house hold distances with Rolator walker. Progressing towards goal  3.   Patient to perform 5/5 lymphedema remedial exercises in session with modified independence utilizing HEP handout, in order to promote optimal independence with management of condition,  as well as promote optimal limb volume reduction required for proper fit of donned clothing in 6 weeks. Ongoing   4.   Patient/Caregiver to verbalize 3/3 signs and symptoms of infection without external cueing, in order to promote optimal self-management of condition in 6 weeks. Ongoing    Long Term Goals: To be accomplished in 24 treatments.  Patient/caregiver will demonstrate improved edema management as evidenced by performing donning/doffing of garments modified independent 3/3 times within session to aid in reducing risk for infection and promote transition to maintenance phase of CDT in 12 weeks.  2.   Patient will demonstrate decrease in self-perceived functional impairment as evidenced by improved score on Lymphedema Life Impact Scale outcome measure from 63 % impairment to 48 % impairment within 12 weeks.      PLAN  Yes  Continue plan of care  Re-Cert Due:  10/26/23 to 1/21/24  PN DUE: 1/16/24    []  Upgrade activities as tolerated  []  Discharge due to :  [x]  Other: 1.  Re-assess donaldo to B/L LE MLB 2. Add toe wraps as indicated    Sowmya Cobb PT, CLT-LINO       1/3/2024       10:45 AM

## 2024-01-05 ENCOUNTER — APPOINTMENT (OUTPATIENT)
Facility: HOSPITAL | Age: 89
End: 2024-01-05
Payer: MEDICARE

## 2024-01-08 ENCOUNTER — HOSPITAL ENCOUNTER (OUTPATIENT)
Facility: HOSPITAL | Age: 89
Setting detail: RECURRING SERIES
Discharge: HOME | End: 2024-01-11
Payer: MEDICARE

## 2024-01-08 PROCEDURE — 97140 MANUAL THERAPY 1/> REGIONS: CPT

## 2024-01-08 NOTE — PROGRESS NOTES
PHYSICAL THERAPY/OCCUPATIONAL THERAPY - MEDICARE DAILY TREATMENT NOTE (updated 3/23)      Date: 2024          Patient Name:  Melissa Collins :  1934   Medical   Diagnosis:  Lymphedema, not elsewhere classified [I89.0] Treatment Diagnosis:  I89.0     Lymphedema, not elsewhere classified, M62.81   Muscle Weakness (generalized), R26.89   Other abnormalities of gait and mobility, and R60.9     Edema, unspecified    Referral Source:  Analisa Gutierrez MD Insurance:   Payor: MEDICARE / Plan: MEDICARE PART A AND B / Product Type: *No Product type* /                     Patient  verified yes     Visit #   Current  / Total 6 24   Time   In / Out 0942 1014   Total Treatment Time 32   Total Timed Codes 32   1:1 Treatment Time 32      [facility] Totals Reminder:  bill using total billable   min of TIMED therapeutic procedures and modalities.   8-22 min = 1 unit; 23-37 min = 2 units; 38-52 min = 3 units;  53-67 min = 4 units; 68-82 min = 5 units         SUBJECTIVE    Pain Level (0-10 scale): 2-3/10 LE's especially the toes    Any medication changes, allergies to medications, adverse drug reactions, diagnosis change, or new procedure performed?: [x] No    [] Yes (see summary sheet for update)  Medications: Verified on Patient Summary List    Subjective functional status/changes:     Pt arrives with daughter and  today for treatment.  Patient states she notes LE swelling after she removes MLB at home to allow her to shower. Patient arrives using Rollator with /daughter present for entire appointment. Patient arrives with bandaging supplies and Velcro style shoes.  Agrees to treatment.     OBJECTIVE    Therapeutic Procedures:  Tx Min Billable or 1:1 Min (if diff from Tx Min) Procedure, Rationale, Specifics   32  55409 Manual Therapy (timed):  decrease pain, increase ROM, increase tissue extensibility, and decrease edema to improve patient's ability to progress to PLOF and address remaining functional

## 2024-01-10 PROBLEM — N18.30 CKD (CHRONIC KIDNEY DISEASE) STAGE 3, GFR 30-59 ML/MIN (HCC): Status: RESOLVED | Noted: 2023-05-16 | Resolved: 2024-01-10

## 2024-01-10 NOTE — PROGRESS NOTES
Melissa Collins (:  1934) is a 89 y.o. female,Established patient, here for evaluation of the following chief complaint(s):  Medicare AWV (Pt is nonfasting)         ASSESSMENT/PLAN:  1. Medicare annual wellness visit, subsequent  2. Essential (primary) hypertension-at goal on labetalol  -     CBC; Future  -     Lipid Panel; Future  -     Comprehensive Metabolic Panel; Future  3. Acquired hypothyroidism-seems stable continue current dose of levothyroxine Synthroid  -     TSH; Future  -     T4, Free; Future  4. Recurrent UTI-stable no symptoms continue with Myrbetriq and estrogen cream  5. Lymphedema, not elsewhere classified she-he is getting her legs wrapped elevate legs stockings  6. PAF (paroxysmal atrial fibrillation) (HCC)-she will discuss with Dr. Chavez if she can have her Eliquis removed continue with Dilt  7. Left leg pain-stable  8. Diarrhea, unspecified type-can take Imodium as needed I do think this is from having her ventral hernia repair and part of the small bowel resected they may make a follow-up appointment Dr. Harrison given the degree of hardness and discomfort that can happen in the abdomen after surgery   9. Stage 3b chronic kidney disease (HCC)-recheck today      No follow-ups on file.     Healing    labetolol  Synthroid  Myrbetriq, estrace cream  Stockings- wrapped   Dilt, eliquis-will discuss coming off of it   ?    Subjective   SUBJECTIVE/OBJECTIVE:  HPI    Arthralgias: Pt c/o aches and pains all over, which she does not take medication for. She uses a walker at all times. Pt states that her neck pain is still present, for which she plans to go to PT for. She has not done it yet  she was seen in the ER for leg pain and has a follow up with      Urinary frequency: Pt reports urinating frequently throughout the night. she was suppose to see urology about the hematuria     Lymphedema: Pt notes improvement in her swelling since wearing stockings.  the clinic is wrapping her

## 2024-01-11 ENCOUNTER — OFFICE VISIT (OUTPATIENT)
Age: 89
End: 2024-01-11
Payer: MEDICARE

## 2024-01-11 ENCOUNTER — APPOINTMENT (OUTPATIENT)
Facility: HOSPITAL | Age: 89
End: 2024-01-11
Payer: MEDICARE

## 2024-01-11 VITALS
SYSTOLIC BLOOD PRESSURE: 126 MMHG | WEIGHT: 162.2 LBS | OXYGEN SATURATION: 95 % | HEART RATE: 66 BPM | DIASTOLIC BLOOD PRESSURE: 88 MMHG | HEIGHT: 62 IN | RESPIRATION RATE: 16 BRPM | TEMPERATURE: 97.5 F | BODY MASS INDEX: 29.85 KG/M2

## 2024-01-11 DIAGNOSIS — E03.9 ACQUIRED HYPOTHYROIDISM: ICD-10-CM

## 2024-01-11 DIAGNOSIS — I48.0 PAF (PAROXYSMAL ATRIAL FIBRILLATION) (HCC): ICD-10-CM

## 2024-01-11 DIAGNOSIS — I10 ESSENTIAL (PRIMARY) HYPERTENSION: ICD-10-CM

## 2024-01-11 DIAGNOSIS — I89.0 LYMPHEDEMA, NOT ELSEWHERE CLASSIFIED: ICD-10-CM

## 2024-01-11 DIAGNOSIS — M79.605 LEFT LEG PAIN: ICD-10-CM

## 2024-01-11 DIAGNOSIS — R19.7 DIARRHEA, UNSPECIFIED TYPE: ICD-10-CM

## 2024-01-11 DIAGNOSIS — N39.0 RECURRENT UTI: ICD-10-CM

## 2024-01-11 DIAGNOSIS — Z00.00 MEDICARE ANNUAL WELLNESS VISIT, SUBSEQUENT: Primary | ICD-10-CM

## 2024-01-11 DIAGNOSIS — N18.32 STAGE 3B CHRONIC KIDNEY DISEASE (HCC): ICD-10-CM

## 2024-01-11 PROBLEM — N18.30 CHRONIC RENAL DISEASE, STAGE III (HCC): Status: ACTIVE | Noted: 2024-01-11

## 2024-01-11 PROCEDURE — 99214 OFFICE O/P EST MOD 30 MIN: CPT | Performed by: INTERNAL MEDICINE

## 2024-01-11 PROCEDURE — 1036F TOBACCO NON-USER: CPT | Performed by: INTERNAL MEDICINE

## 2024-01-11 PROCEDURE — G8428 CUR MEDS NOT DOCUMENT: HCPCS | Performed by: INTERNAL MEDICINE

## 2024-01-11 PROCEDURE — G8484 FLU IMMUNIZE NO ADMIN: HCPCS | Performed by: INTERNAL MEDICINE

## 2024-01-11 PROCEDURE — 1123F ACP DISCUSS/DSCN MKR DOCD: CPT | Performed by: INTERNAL MEDICINE

## 2024-01-11 PROCEDURE — G0439 PPPS, SUBSEQ VISIT: HCPCS | Performed by: INTERNAL MEDICINE

## 2024-01-11 PROCEDURE — G8417 CALC BMI ABV UP PARAM F/U: HCPCS | Performed by: INTERNAL MEDICINE

## 2024-01-11 PROCEDURE — 1090F PRES/ABSN URINE INCON ASSESS: CPT | Performed by: INTERNAL MEDICINE

## 2024-01-11 RX ORDER — METHENAMINE HIPPURATE 1000 MG/1
1 TABLET ORAL 2 TIMES DAILY
COMMUNITY
Start: 2023-12-26

## 2024-01-11 SDOH — ECONOMIC STABILITY: HOUSING INSECURITY
IN THE LAST 12 MONTHS, WAS THERE A TIME WHEN YOU DID NOT HAVE A STEADY PLACE TO SLEEP OR SLEPT IN A SHELTER (INCLUDING NOW)?: NO

## 2024-01-11 SDOH — ECONOMIC STABILITY: FOOD INSECURITY: WITHIN THE PAST 12 MONTHS, YOU WORRIED THAT YOUR FOOD WOULD RUN OUT BEFORE YOU GOT MONEY TO BUY MORE.: NEVER TRUE

## 2024-01-11 SDOH — ECONOMIC STABILITY: FOOD INSECURITY: WITHIN THE PAST 12 MONTHS, THE FOOD YOU BOUGHT JUST DIDN'T LAST AND YOU DIDN'T HAVE MONEY TO GET MORE.: NEVER TRUE

## 2024-01-11 SDOH — ECONOMIC STABILITY: INCOME INSECURITY: HOW HARD IS IT FOR YOU TO PAY FOR THE VERY BASICS LIKE FOOD, HOUSING, MEDICAL CARE, AND HEATING?: NOT HARD AT ALL

## 2024-01-11 ASSESSMENT — LIFESTYLE VARIABLES
HOW OFTEN DO YOU HAVE A DRINK CONTAINING ALCOHOL: NEVER
HOW MANY STANDARD DRINKS CONTAINING ALCOHOL DO YOU HAVE ON A TYPICAL DAY: PATIENT DOES NOT DRINK

## 2024-01-11 ASSESSMENT — PATIENT HEALTH QUESTIONNAIRE - PHQ9
1. LITTLE INTEREST OR PLEASURE IN DOING THINGS: 0
SUM OF ALL RESPONSES TO PHQ QUESTIONS 1-9: 1
2. FEELING DOWN, DEPRESSED OR HOPELESS: 1
SUM OF ALL RESPONSES TO PHQ9 QUESTIONS 1 & 2: 1

## 2024-01-11 NOTE — PATIENT INSTRUCTIONS
Learning About Being Active as an Older Adult  Why is being active important as you get older?     Being active is one of the best things you can do for your health. And it's never too late to start. Being active--or getting active, if you aren't already--has definite benefits. It can:  Give you more energy,  Keep your mind sharp.  Improve balance to reduce your risk of falls.  Help you manage chronic illness with fewer medicines.  No matter how old you are, how fit you are, or what health problems you have, there is a form of activity that will work for you. And the more physical activity you can do, the better your overall health will be.  What kinds of activity can help you stay healthy?  Being more active will make your daily activities easier. Physical activity includes planned exercise and things you do in daily life. There are four types of activity:  Aerobic.  Doing aerobic activity makes your heart and lungs strong.  Includes walking, dancing, and gardening.  Aim for at least 2½ hours spread throughout the week.  It improves your energy and can help you sleep better.  Muscle-strengthening.  This type of activity can help maintain muscle and strengthen bones.  Includes climbing stairs, using resistance bands, and lifting or carrying heavy loads.  Aim for at least twice a week.  It can help protect the knees and other joints.  Stretching.  Stretching gives you better range of motion in joints and muscles.  Includes upper arm stretches, calf stretches, and gentle yoga.  Aim for at least twice a week, preferably after your muscles are warmed up from other activities.  It can help you function better in daily life.  Balancing.  This helps you stay coordinated and have good posture.  Includes heel-to-toe walking, nayana chi, and certain types of yoga.  Aim for at least 3 days a week.  It can reduce your risk of falling.  Even if you have a hard time meeting the recommendations, it's better to be more active

## 2024-01-12 LAB
ALBUMIN SERPL-MCNC: 3.6 G/DL (ref 3.5–5)
ALBUMIN/GLOB SERPL: 1.2 (ref 1.1–2.2)
ALP SERPL-CCNC: 74 U/L (ref 45–117)
ALT SERPL-CCNC: 14 U/L (ref 12–78)
ANION GAP SERPL CALC-SCNC: 9 MMOL/L (ref 5–15)
AST SERPL-CCNC: 12 U/L (ref 15–37)
BILIRUB SERPL-MCNC: 0.7 MG/DL (ref 0.2–1)
BUN SERPL-MCNC: 26 MG/DL (ref 6–20)
BUN/CREAT SERPL: 19 (ref 12–20)
CALCIUM SERPL-MCNC: 9.1 MG/DL (ref 8.5–10.1)
CHLORIDE SERPL-SCNC: 111 MMOL/L (ref 97–108)
CHOLEST SERPL-MCNC: 119 MG/DL
CO2 SERPL-SCNC: 23 MMOL/L (ref 21–32)
CREAT SERPL-MCNC: 1.35 MG/DL (ref 0.55–1.02)
ERYTHROCYTE [DISTWIDTH] IN BLOOD BY AUTOMATED COUNT: 15.6 % (ref 11.5–14.5)
GLOBULIN SER CALC-MCNC: 3.1 G/DL (ref 2–4)
GLUCOSE SERPL-MCNC: 116 MG/DL (ref 65–100)
HCT VFR BLD AUTO: 33.6 % (ref 35–47)
HDLC SERPL-MCNC: 48 MG/DL
HDLC SERPL: 2.5 (ref 0–5)
HGB BLD-MCNC: 10.8 G/DL (ref 11.5–16)
LDLC SERPL CALC-MCNC: 58.2 MG/DL (ref 0–100)
MCH RBC QN AUTO: 31.1 PG (ref 26–34)
MCHC RBC AUTO-ENTMCNC: 32.1 G/DL (ref 30–36.5)
MCV RBC AUTO: 96.8 FL (ref 80–99)
NRBC # BLD: 0 K/UL (ref 0–0.01)
NRBC BLD-RTO: 0 PER 100 WBC
PLATELET # BLD AUTO: 212 K/UL (ref 150–400)
PMV BLD AUTO: 10 FL (ref 8.9–12.9)
POTASSIUM SERPL-SCNC: 4 MMOL/L (ref 3.5–5.1)
PROT SERPL-MCNC: 6.7 G/DL (ref 6.4–8.2)
RBC # BLD AUTO: 3.47 M/UL (ref 3.8–5.2)
SODIUM SERPL-SCNC: 143 MMOL/L (ref 136–145)
T4 FREE SERPL-MCNC: 1.4 NG/DL (ref 0.8–1.5)
TRIGL SERPL-MCNC: 64 MG/DL
TSH SERPL DL<=0.05 MIU/L-ACNC: 4.46 UIU/ML (ref 0.36–3.74)
VLDLC SERPL CALC-MCNC: 12.8 MG/DL
WBC # BLD AUTO: 5.6 K/UL (ref 3.6–11)

## 2024-01-15 ENCOUNTER — HOSPITAL ENCOUNTER (OUTPATIENT)
Facility: HOSPITAL | Age: 89
Setting detail: RECURRING SERIES
Discharge: HOME | End: 2024-01-18
Payer: MEDICARE

## 2024-01-15 PROCEDURE — 97140 MANUAL THERAPY 1/> REGIONS: CPT

## 2024-01-15 NOTE — PROGRESS NOTES
OBJECTIVE    Therapeutic Procedures:  Tx Min Billable or 1:1 Min (if diff from Tx Min) Procedure, Rationale, Specifics   31  92249 Manual Therapy (timed):  decrease pain, increase ROM, increase tissue extensibility, and decrease edema to improve patient's ability to progress to PLOF and address remaining functional goals.  The manual therapy interventions were performed at a separate and distinct time from the therapeutic activities interventions . (see flow sheet as applicable)    Details if applicable:    Manual Lymphatic Drainage (MLD):  Area to decongest: LE Bilateral, foot, ankle, calf, and mid knee   Sequence used and effectiveness:  Secondary sequence for lower extremities without trunk involvement.  Applied low pH lotion with upward strokes.   Skin/wound care/debridement: Reviewed skin care principles:   Performed skin care with low pH lotion following manual lymph principles., Skin care products., Hygiene., Prevention of cellulitis., and Wound care.  Skin intact    Aquaphor/Sarna to address itching LE's.     Applied multi-layer compression bandaging to: Patient arrived without compression on. Bandage supplies and Velcro shoes in hand.    The following multi-layer bandages were applied:  Protouch Stockinette    Padding layer:  Cellona and Fleece (bulk out ankle)    Foam:  10 cm roll  Grey foam kidney: medial R/L  Komprex II R calf medial/lateral, dorsal foot bilateral-deferred.    Short stretch bandages:   6 cm, 8 cm, and 10 cm    Spiral technique with mild to moderate tension. Tubi  and non skid socks to cover bandages. Applied Velcro shoe. Added Velcro to be able to fasten across dorsum of foot.     Patient ambulates mod I with rollator walker out of clinic with MLB in place without c/o.    Pt instructed regarding home management of compression bandaging as follows: Daughter present. Offered to teach family to learn MLB. Patient declines.       Compression bandaging will be applied twice a week by

## 2024-01-15 NOTE — THERAPY RECERTIFICATION
[facility]   a part of [facility]  [address], [address]   [place] [address]   Phone: [phone]  Fax: [phone]      CONTINUED PLAN OF CARE/RECERTIFICATION FOR PHYSICAL THERAPY          Patient Name:              Melissa Collins :  1934   Treatment/Medical Diagnosis:  Lymphedema, not elsewhere classified [I89.0]   Onset Date:  , 20+ year history of LE swelling reported by patient    Referral Source:  Analisa Gutierrez MD Start of Care (SOC):  10/26/2024   Prior Hospitalization:  See Medical History Provider #:  [id number]      Prior Level of Function (PLOF):  Lymphedema was well managed with wear of custom flat knit compression stockings, daily use of vasopneumatic pump.    Comorbidities:  Arthritis, a-fib, frequent UTIs, ventral hernia repair, GERD, hysterectomy, HTN, melanoma, thyroid disease.    Medications:  Verified on Patient Summary List   Visits from SOC:  7 Missed Visits:  2     Progress toward Goals:  Patient will demonstrate decreased volumetric measurements by 300-400 mL each LE in order to reduce risk for infection, decrease feeling of limb heaviness, to reduce fall risk.   Status at last Eval/Progress Note: ongoing  Current Status: ongoing  Goal Met?  no    2.  Patient will report bilateral LE extremity pain decrease from 4/10 to 2/10 during ambulation house hold distances with Rolator walker.   Status at last Eval/Progress Note: ongoing  Current Status: progressing toward goal  Goal Met?  no    3. Patient to perform 5/5 lymphedema remedial exercises in session with modified independence utilizing HEP handout, in order to promote optimal independence with management of condition,  as well as promote optimal limb volume reduction required for proper fit of donned clothing in 6 weeks.   Status at last Eval/Progress Note: ongoing  Current Status: ongoing  Goal Met?  no    4.   Patient/Caregiver to verbalize 3/3 signs and symptoms of

## 2024-01-18 ENCOUNTER — HOSPITAL ENCOUNTER (OUTPATIENT)
Facility: HOSPITAL | Age: 89
Setting detail: RECURRING SERIES
Discharge: HOME | End: 2024-01-21
Payer: MEDICARE

## 2024-01-18 ENCOUNTER — APPOINTMENT (OUTPATIENT)
Facility: HOSPITAL | Age: 89
End: 2024-01-18
Payer: MEDICARE

## 2024-01-18 PROCEDURE — 97140 MANUAL THERAPY 1/> REGIONS: CPT

## 2024-01-18 NOTE — PROGRESS NOTES
lbs  BMI: 30.5  (36 or greater: adversely affecting lymphedema)    Volumes:  Date Right (mL) Left (mL) Volume difference %   Difference   1/18/2024 10,522.50 9535.39 -987.11 -9.38   10/26/23 10,669.42 10,544.63  1.18   Percent difference today is 1.18% on evaluation.      Pain Level at end of session (0-10 scale): 0/10      Assessment   Patient returns to the clinic for treatment today with /daughter.  Yellow skin slough reduced overall.  Patient able to tolerate bandage and ambulate safely post treatment with Velcro shoes and Rollator.  Patient declined family education in MLB training previous visit. Patient with c/o of knee and upper leg swelling however with mobility concerns will need to be more creative with compression routine.  After discussion regarding compression garment options, patient measured for Juzo compression wrap foot/calf pieces with full foot liners bilateral LE. Order sent to  for coverage check due to initial DME requiring patient to pay up front and wait for Medicare reimbursement.  Patient reports receiving a phone call from them regarding obtaining order. Spouse to accompany patient on day of garment fitting to educate regarding appropriate donning, wear/care of garments.    Patient will continue to benefit from skilled PT / OT services to address functional mobility deficits, address ROM deficits, address swelling, analyze and address soft tissue restrictions, analyze and cue movement patterns, analyze and modify body mechanics/ergonomics, analyze compression product fit and use, assess and modify postural abnormalities, instruct in home and community integration, instruct in home lymphedema management program, measure for compression products, and modify and progress therapeutic interventions to address functional deficits and attain remaining goals.    Progress toward goals / Updated goals:  []  See Progress Note/Re certification  Patient/caregiver will demonstrate improved

## 2024-01-22 ENCOUNTER — HOSPITAL ENCOUNTER (OUTPATIENT)
Facility: HOSPITAL | Age: 89
Setting detail: RECURRING SERIES
Discharge: HOME | End: 2024-01-25
Payer: MEDICARE

## 2024-01-22 PROCEDURE — 97140 MANUAL THERAPY 1/> REGIONS: CPT

## 2024-01-22 NOTE — PROGRESS NOTES
PHYSICAL THERAPY/OCCUPATIONAL THERAPY - MEDICARE DAILY TREATMENT NOTE (updated 3/23)      Date: 2024          Patient Name:  Melissa Collins :  1934   Medical   Diagnosis:  Lymphedema, not elsewhere classified [I89.0] Treatment Diagnosis:  I89.0     Lymphedema, not elsewhere classified, M62.81   Muscle Weakness (generalized), R26.89   Other abnormalities of gait and mobility, and R60.9     Edema, unspecified    Referral Source:  Analisa Gutierrez MD Insurance:   Payor: MEDICARE / Plan: MEDICARE PART A AND B / Product Type: *No Product type* /                     Patient  verified yes     Visit #   Current  / Total 9 24   Time   In / Out 0950 1032   Total Treatment Time 42   Total Timed Codes 42   1:1 Treatment Time 42      [facility] Totals Reminder:  bill using total billable   min of TIMED therapeutic procedures and modalities.   8-22 min = 1 unit; 23-37 min = 2 units; 38-52 min = 3 units;  53-67 min = 4 units; 68-82 min = 5 units         SUBJECTIVE    Pain Level (0-10 scale): 2-3/10 LE's , heaviness, skin tightness    Any medication changes, allergies to medications, adverse drug reactions, diagnosis change, or new procedure performed?: [x] No    [] Yes (see summary sheet for update)  Medications: Verified on Patient Summary List    Subjective functional status/changes:     Pt arrives with daughter and  today for treatment.  Patient states she unwrapped her legs for shower this morning. Patient arrives using rollator with /daughter present for entire appointment. Patient stated previous visit she received a call from  who reported they are working on getting her velcro compression wraps. Patient is anxious to get her compression wraps.  Advised her that Medicare covering garments is new beginning this year, and [employer] companies are trying to figure things out. Patient verbalizes understanding. Patient arrives with bandaging supplies and Velcro style shoes.  Agrees to treatment.

## 2024-01-25 ENCOUNTER — HOSPITAL ENCOUNTER (OUTPATIENT)
Facility: HOSPITAL | Age: 89
Setting detail: RECURRING SERIES
Discharge: HOME | End: 2024-01-28
Payer: MEDICARE

## 2024-01-25 ENCOUNTER — APPOINTMENT (OUTPATIENT)
Facility: HOSPITAL | Age: 89
End: 2024-01-25
Payer: MEDICARE

## 2024-01-25 PROCEDURE — 97140 MANUAL THERAPY 1/> REGIONS: CPT

## 2024-01-25 NOTE — PROGRESS NOTES
PHYSICAL THERAPY/OCCUPATIONAL THERAPY - MEDICARE DAILY TREATMENT NOTE (updated 3/23)      Date: 2024          Patient Name:  Melissa Collins :  1934   Medical   Diagnosis:  Lymphedema, not elsewhere classified [I89.0] Treatment Diagnosis:  I89.0     Lymphedema, not elsewhere classified, M62.81   Muscle Weakness (generalized), R26.89   Other abnormalities of gait and mobility, and R60.9     Edema, unspecified    Referral Source:  Analisa Gutierrez MD Insurance:   Payor: MEDICARE / Plan: MEDICARE PART A AND B / Product Type: *No Product type* /                     Patient  verified yes     Visit #   Current  / Total 10 24   Time   In / Out 1405 1445   Total Treatment Time 40   Total Timed Codes 40   1:1 Treatment Time 40      [facility] Totals Reminder:  bill using total billable   min of TIMED therapeutic procedures and modalities.   8-22 min = 1 unit; 23-37 min = 2 units; 38-52 min = 3 units;  53-67 min = 4 units; 68-82 min = 5 units         SUBJECTIVE    Pain Level (0-10 scale): 2-3/10 LE's, heaviness, skin tightness    Any medication changes, allergies to medications, adverse drug reactions, diagnosis change, or new procedure performed?: [x] No    [] Yes (see summary sheet for update)  Medications: Verified on Patient Summary List    Subjective functional status/changes:     Pt arrives with daughter and  today for treatment.  Patient states she unwrapped her legs for shower this morning. Patient arrives using rollator with /daughter present for entire appointment. Patient stated previous visit she received a call from  who reported they are working on getting her velcro compression wraps. Patient is anxious to get her compression wraps.  Advised her that Medicare covering garments is new beginning this year, and [employer] companies are trying to figure things out. Patient verbalizes understanding. Patient arrives with bandaging supplies and Velcro style shoes.     Pt says she went to

## 2024-01-29 ENCOUNTER — HOSPITAL ENCOUNTER (OUTPATIENT)
Facility: HOSPITAL | Age: 89
Setting detail: RECURRING SERIES
Discharge: HOME | End: 2024-02-01
Payer: MEDICARE

## 2024-01-29 PROCEDURE — 97140 MANUAL THERAPY 1/> REGIONS: CPT

## 2024-01-29 NOTE — PROGRESS NOTES
PHYSICAL THERAPY/OCCUPATIONAL THERAPY - MEDICARE DAILY TREATMENT NOTE (updated 3/23)      Date: 2024          Patient Name:  Melissa Collins :  1934   Medical   Diagnosis:  Lymphedema, not elsewhere classified [I89.0] Treatment Diagnosis:  I89.0     Lymphedema, not elsewhere classified, M62.81   Muscle Weakness (generalized), R26.89   Other abnormalities of gait and mobility, and R60.9     Edema, unspecified    Referral Source:  Analisa Gutierrez MD Insurance:   Payor: MEDICARE / Plan: MEDICARE PART A AND B / Product Type: *No Product type* /                     Patient  verified yes     Visit #   Current  / Total 11 24   Time   In / Out 0935 1020   Total Treatment Time 45   Total Timed Codes 45   1:1 Treatment Time 45      [facility] Totals Reminder:  bill using total billable   min of TIMED therapeutic procedures and modalities.   8-22 min = 1 unit; 23-37 min = 2 units; 38-52 min = 3 units;  53-67 min = 4 units; 68-82 min = 5 units         SUBJECTIVE    Pain Level (0-10 scale): 2-3/10 LE's, heaviness, skin tightness    Any medication changes, allergies to medications, adverse drug reactions, diagnosis change, or new procedure performed?: [x] No    [] Yes (see summary sheet for update)  Medications: Verified on Patient Summary List    Subjective functional status/changes:     Pt arrives with daughter and  today for treatment.  Patient states she unwrapped her legs for shower this morning. Patient arrives using rollator with /daughter present for entire appointment. Patient stated previous visit she received a call from  who reported they are working on getting her velcro compression wraps. Patient is anxious to get her compression wraps.  Advised her that Medicare covering garments is new beginning this year, and [employer] companies are trying to figure things out. Patient verbalizes understanding. Patient arrives with bandaging supplies and Velcro style shoes.     Previous visit

## 2024-02-01 ENCOUNTER — HOSPITAL ENCOUNTER (OUTPATIENT)
Facility: HOSPITAL | Age: 89
Setting detail: RECURRING SERIES
Discharge: HOME | End: 2024-02-04
Payer: MEDICARE

## 2024-02-01 PROCEDURE — 97140 MANUAL THERAPY 1/> REGIONS: CPT

## 2024-02-01 NOTE — PROGRESS NOTES
PHYSICAL THERAPY/OCCUPATIONAL THERAPY - MEDICARE DAILY TREATMENT NOTE (updated 3/23)      Date: 2024          Patient Name:  Melissa Collins :  1934   Medical   Diagnosis:  Lymphedema, not elsewhere classified [I89.0] Treatment Diagnosis:  I89.0     Lymphedema, not elsewhere classified, M62.81   Muscle Weakness (generalized), R26.89   Other abnormalities of gait and mobility, and R60.9     Edema, unspecified    Referral Source:  Analisa Gutierrez MD Insurance:   Payor: MEDICARE / Plan: MEDICARE PART A AND B / Product Type: *No Product type* /                     Patient  verified yes     Visit #   Current  / Total 12 24   Time   In / Out 0950 1031   Total Treatment Time 41   Total Timed Codes 41   1:1 Treatment Time 41      [facility] Totals Reminder:  bill using total billable   min of TIMED therapeutic procedures and modalities.   8-22 min = 1 unit; 23-37 min = 2 units; 38-52 min = 3 units;  53-67 min = 4 units; 68-82 min = 5 units         SUBJECTIVE    Pain Level (0-10 scale): 2-3/10 LE's, heaviness, skin tightness    Any medication changes, allergies to medications, adverse drug reactions, diagnosis change, or new procedure performed?: [x] No    [] Yes (see summary sheet for update)  Medications: Verified on Patient Summary List    Subjective functional status/changes:     Pt arrives with daughter and  today for treatment.  Patient states she unwrapped her legs for shower this morning at 6 am. Patient arrives using rollator with /daughter present for entire appointment. Patient stated previous visit she received a call from  who reported they are working on getting her velcro compression wraps. Patient is anxious to get her compression wraps.  Advised her that Medicare covering garments is new beginning this year, and [employer] companies are trying to figure things out. Patient states she would like to consider paying up front for compression wraps if it will allow her to get them sooner.

## 2024-02-02 ENCOUNTER — TELEPHONE (OUTPATIENT)
Age: 89
End: 2024-02-02

## 2024-02-02 NOTE — TELEPHONE ENCOUNTER
Patient's daughter, Kelsea is [facility] sent over a fax for a lymphedema sock for patient. Kelsea wants to know if we received the fax. Please advise

## 2024-02-05 ENCOUNTER — TELEPHONE (OUTPATIENT)
Age: 89
End: 2024-02-05

## 2024-02-05 NOTE — TELEPHONE ENCOUNTER
Patient's daughter Kelsea is calling again  [facility] sent over a fax for lymphedema compression socks for patient. Kelsea wants to know if we received the fax. She is sending it again today to [phone].    Kelsea [phone] (Mobile)

## 2024-02-06 NOTE — TELEPHONE ENCOUNTER
Left message advising patient's daughter the form was received twice and completed and faxed back.

## 2024-02-08 ENCOUNTER — HOSPITAL ENCOUNTER (OUTPATIENT)
Facility: HOSPITAL | Age: 89
Setting detail: RECURRING SERIES
Discharge: HOME | End: 2024-02-11
Payer: MEDICARE

## 2024-02-08 PROCEDURE — 97140 MANUAL THERAPY 1/> REGIONS: CPT

## 2024-02-08 NOTE — PROGRESS NOTES
PHYSICAL THERAPY/OCCUPATIONAL THERAPY - MEDICARE DAILY TREATMENT NOTE (updated 3/23)      Date: 2024          Patient Name:  Melissa Collins :  1934   Medical   Diagnosis:  Lymphedema, not elsewhere classified [I89.0] Treatment Diagnosis:  I89.0     Lymphedema, not elsewhere classified, M62.81   Muscle Weakness (generalized), R26.89   Other abnormalities of gait and mobility, and R60.9     Edema, unspecified    Referral Source:  Analisa Gutierrez MD Insurance:   Payor: MEDICARE / Plan: MEDICARE PART A AND B / Product Type: *No Product type* /                     Patient  verified yes     Visit #   Current  / Total 13 24   Time   In / Out 1100 1136   Total Treatment Time 36   Total Timed Codes 36   1:1 Treatment Time 36      [facility] Totals Reminder:  bill using total billable   min of TIMED therapeutic procedures and modalities.   8-22 min = 1 unit; 23-37 min = 2 units; 38-52 min = 3 units;  53-67 min = 4 units; 68-82 min = 5 units         SUBJECTIVE    Pain Level (0-10 scale): 2-3/10 LE's, heaviness, skin tightness    Any medication changes, allergies to medications, adverse drug reactions, diagnosis change, or new procedure performed?: [x] No    [] Yes (see summary sheet for update)  Medications: Verified on Patient Summary List    Subjective functional status/changes:     Pt arrives with daughter and  today for treatment.  Patient states she unwrapped her legs for shower this morning at 6 am. Arrives with bare legs and supplies in hand.  Pleased with appearance of wrinkles around the knees.  States they seem \"less swollen\".  Patient arrives using Rolator with /daughter present for entire appointment. Per Daughter they chose to obtain garments from  because it was less cost up front compared to Compression care where they have to pay for the entire price of DME then get reimbursement.  Daughter reports that  has all of the auth necessary and \"they have sent it to billing and expect

## 2024-02-12 ENCOUNTER — HOSPITAL ENCOUNTER (OUTPATIENT)
Facility: HOSPITAL | Age: 89
Setting detail: RECURRING SERIES
Discharge: HOME | End: 2024-02-15
Payer: MEDICARE

## 2024-02-12 PROCEDURE — 97140 MANUAL THERAPY 1/> REGIONS: CPT

## 2024-02-12 NOTE — PROGRESS NOTES
independence utilizing HEP handout, in order to promote optimal independence with management of condition,  as well as promote optimal limb volume reduction required for proper fit of donned clothing in 6 weeks.2/12/24 Cont to progress towards goal  4.   Patient/Caregiver to verbalize 3/3 signs and symptoms of infection without external cueing, in order to promote optimal self-management of condition in 6 weeks. 2/12/24 Cont to progress towards goal    Long Term Goals: To be accomplished in 24 treatments.  Patient/caregiver will demonstrate improved edema management as evidenced by performing donning/doffing of garments modified independent 3/3 times within session to aid in reducing risk for infection and promote transition to maintenance phase of CDT in 12 weeks.12/12/24 Cont to progress towards goal  2.   Patient will demonstrate decrease in self-perceived functional impairment as evidenced by improved score on Lymphedema Life Impact Scale outcome measure from 63 % impairment to 48 % impairment within 12 weeks.2/12/24 Cont to progress towards goal       RECOMMENDATIONS     Cont POC as established on Evaluation 2x/week for phase 1.            Amy Lombardo PT,DPT, CLT-LINO    2/12/2024       2:45 PM    If you have any questions/comments please contact us directly at [phone].   Thank you for allowing us to assist in the care of your patient.   
Lactate.  Monitoring area posterior-lateral aspect L lower leg, scab remaining in place, no drainage noted. Patient has received Velcro equipment today and brought to clinic with request to perform don/doffing education next visit as they have 2 appointments the following week in case they need greater assistance using new devices.  Patient requesting to be re-bandaged due to only 1 appointment this week.  Patient able to tolerate bandage and ambulate safely post treatment with Velcro shoes and Rolator.  Spouse and daughter to perform total A don/doffing of velcro equipment as patient unable to bend to reach foot/ankle safely. Patient hoping to utilize compression stockings during the day and velcro devices for night time.  Compression routine TBD based upon volumes and flat knit garment sizing check next time.    Patient will continue to benefit from skilled PT / OT services to address functional mobility deficits, address ROM deficits, address swelling, analyze and address soft tissue restrictions, analyze and cue movement patterns, analyze and modify body mechanics/ergonomics, analyze compression product fit and use, assess and modify postural abnormalities, instruct in home and community integration, instruct in home lymphedema management program, measure for compression products, and modify and progress therapeutic interventions to address functional deficits and attain remaining goals.    Progress toward goals / Updated goals:  []  See Progress Note/Re certification  Patient/caregiver will demonstrate improved edema management as evidenced by spouse/patient performing donning/doffing of velcro compression wrap garments 2/2 times within session to aid in reducing risk for infection and promote transition to maintenance phase of CDT in 12 weeks. 1/22/2024 ongoing  Patient will demonstrate decrease in self-perceived functional impairment as evidenced by improved score on Lymphedema Life Impact Scale outcome

## 2024-02-15 ENCOUNTER — OFFICE VISIT (OUTPATIENT)
Age: 89
End: 2024-02-15
Payer: MEDICARE

## 2024-02-15 VITALS — SYSTOLIC BLOOD PRESSURE: 138 MMHG | HEART RATE: 96 BPM | OXYGEN SATURATION: 95 % | DIASTOLIC BLOOD PRESSURE: 78 MMHG

## 2024-02-15 DIAGNOSIS — R06.09 DOE (DYSPNEA ON EXERTION): ICD-10-CM

## 2024-02-15 DIAGNOSIS — N18.32 STAGE 3B CHRONIC KIDNEY DISEASE (HCC): ICD-10-CM

## 2024-02-15 DIAGNOSIS — D68.69 SECONDARY HYPERCOAGULABLE STATE (HCC): ICD-10-CM

## 2024-02-15 DIAGNOSIS — I10 HTN (HYPERTENSION), BENIGN: ICD-10-CM

## 2024-02-15 DIAGNOSIS — I48.19 PERSISTENT ATRIAL FIBRILLATION (HCC): Primary | ICD-10-CM

## 2024-02-15 PROCEDURE — 99214 OFFICE O/P EST MOD 30 MIN: CPT | Performed by: SPECIALIST

## 2024-02-15 PROCEDURE — G8484 FLU IMMUNIZE NO ADMIN: HCPCS | Performed by: SPECIALIST

## 2024-02-15 PROCEDURE — 1090F PRES/ABSN URINE INCON ASSESS: CPT | Performed by: SPECIALIST

## 2024-02-15 PROCEDURE — G8417 CALC BMI ABV UP PARAM F/U: HCPCS | Performed by: SPECIALIST

## 2024-02-15 PROCEDURE — 1123F ACP DISCUSS/DSCN MKR DOCD: CPT | Performed by: SPECIALIST

## 2024-02-15 PROCEDURE — G8427 DOCREV CUR MEDS BY ELIG CLIN: HCPCS | Performed by: SPECIALIST

## 2024-02-15 PROCEDURE — 1036F TOBACCO NON-USER: CPT | Performed by: SPECIALIST

## 2024-02-15 NOTE — PROGRESS NOTES
had concerns including Follow-up (6 months), Atrial Fibrillation, and Hypertension.    Vitals:    02/15/24 1339   BP: 138/78   Site: Right Upper Arm   Position: Sitting   Pulse: 96   SpO2: 95%        Chest pain No    Refills No        1. Have you been to the ER, urgent care clinic since your last visit? No       Hospitalized since your last visit? No       Where?        Date?

## 2024-02-15 NOTE — PROGRESS NOTES
Deyanira Al MD. [facility]          Patient: Melissa Collins  : 1934      Today's Date: 2/15/2024        HISTORY OF PRESENT ILLNESS:     History of Present Illness:    Doing OK overall.  Has some stomach ache.        PAST MEDICAL HISTORY:     Past Medical History:   Diagnosis Date    Anesthesia complication     confusion, slow to arouse    Arthritis     Atrial fibrillation (HCC)     discovered 5/15/23    Cataract     bilateral eyes    Frequent urinary tract infections     GERD (gastroesophageal reflux disease)     esophageal strictures    H/O ventral hernia repair     H/O: hysterectomy     Hypertension     Lymphedema     Melanoma (HCC)     leg    Thyroid disease        Past Surgical History:   Procedure Laterality Date    CATARACT REMOVAL Bilateral     CHOLECYSTECTOMY      COLONOSCOPY      CYSTOSCOPY      CYSTOSCOPY N/A 2023    CYSTOSCOPY BLADDER BIOPSY performed by Manan Martinez MD at [facility] MAIN OR    ELBOW SURGERY Left     ORIF left elbow    ENDOSCOPY, COLON, DIAGNOSTIC      HERNIA REPAIR      x2    HYSTERECTOMY (CERVIX STATUS UNKNOWN)      MOHS SURGERY      OTHER SURGICAL HISTORY      multiple excision of skin cancer    TONSILLECTOMY      TOTAL KNEE ARTHROPLASTY Bilateral     VENTRAL HERNIA REPAIR N/A 2023    RECURRENT  INCARCERATED VENTRAL HERNIA  REPAIR; EXTENSIVE LYSIS OF ADHESIONS;SMALL BOWEL RESECTION performed by Dione Tripathi MD at [facility] MAIN OR             CURRENT MEDICATIONS:    .  Current Outpatient Medications   Medication Sig Dispense Refill    methenamine (HIPREX) 1 g tablet Take 1 tablet by mouth 2 times daily      levothyroxine (SYNTHROID) 75 MCG tablet TAKE 1 TABLET BY MOUTH ONCE DAILY BEFORE BREAKFAST 90 tablet 0    labetalol (NORMODYNE) 200 MG tablet Take 1 tablet by mouth twice daily 180 tablet 0    apixaban (ELIQUIS) 5 MG TABS tablet Take 1 tablet by mouth 2 times daily (Patient taking differently: Take 1 tablet by mouth daily) 180 tablet 3    dilTIAZem (CARDIZEM CD) 240

## 2024-02-19 ENCOUNTER — HOSPITAL ENCOUNTER (OUTPATIENT)
Facility: HOSPITAL | Age: 89
Setting detail: RECURRING SERIES
Discharge: HOME | End: 2024-02-22
Payer: MEDICARE

## 2024-02-19 PROCEDURE — 97760 ORTHOTIC MGMT&TRAING 1ST ENC: CPT

## 2024-02-19 PROCEDURE — 97140 MANUAL THERAPY 1/> REGIONS: CPT

## 2024-02-19 PROCEDURE — 97535 SELF CARE MNGMENT TRAINING: CPT

## 2024-02-19 NOTE — PROGRESS NOTES
PHYSICAL THERAPY/OCCUPATIONAL THERAPY - MEDICARE DAILY TREATMENT NOTE (updated 3/23)      Date: 2024          Patient Name:  Melissa Collins :  1934   Medical   Diagnosis:  Lymphedema, not elsewhere classified [I89.0] Treatment Diagnosis:  I89.0     Lymphedema, not elsewhere classified, M62.81   Muscle Weakness (generalized), R26.89   Other abnormalities of gait and mobility, and R60.9     Edema, unspecified    Referral Source:  Analisa Gutierrez MD Insurance:   Payor: MEDICARE / Plan: MEDICARE PART A AND B / Product Type: *No Product type* /                     Patient  verified yes     Visit #   Current  / Total 15 24   Time   In / Out 12:30 1340   Total Treatment Time 70   Total Timed Codes 70   1:1 Treatment Time 70      [facility] Totals Reminder:  bill using total billable   min of TIMED therapeutic procedures and modalities.   8-22 min = 1 unit; 23-37 min = 2 units; 38-52 min = 3 units;  53-67 min = 4 units; 68-82 min = 5 units         SUBJECTIVE    Pain Level (0-10 scale): pain with touching legs 3-4/10     Any medication changes, allergies to medications, adverse drug reactions, diagnosis change, or new procedure performed?: [x] No    [] Yes (see summary sheet for update)  Medications: Verified on Patient Summary List    Subjective functional status/changes:     Pt arrives using Rolator with daughter and  today for treatment.  Patient states she unwrapped her legs for a shower this morning at 6 am. Arrives with bare legs and bandaging supplies in hand. Family/patient eager to try on the Juzo Velcro devices and learn how to use them. Patient worried about the complexity of the Velcro devices and if her  (has dementia) and her can put them on together.  Pt agrees to treatment.     Previous visit patient/daughter informs patient went to dermatologist last week due to concern about area on her right leg, however pt says this area was fine. Pt says she developed a new bump on left

## 2024-02-22 ENCOUNTER — HOSPITAL ENCOUNTER (OUTPATIENT)
Facility: HOSPITAL | Age: 89
Setting detail: RECURRING SERIES
Discharge: HOME | End: 2024-02-25
Payer: MEDICARE

## 2024-02-22 PROCEDURE — 97763 ORTHC/PROSTC MGMT SBSQ ENC: CPT

## 2024-02-22 PROCEDURE — 97140 MANUAL THERAPY 1/> REGIONS: CPT

## 2024-02-22 NOTE — PROGRESS NOTES
PHYSICAL THERAPY/OCCUPATIONAL THERAPY - MEDICARE DAILY TREATMENT NOTE (updated 3/23)      Date: 2024          Patient Name:  Melissa Collins :  1934   Medical   Diagnosis:  Lymphedema, not elsewhere classified [I89.0] Treatment Diagnosis:  I89.0     Lymphedema, not elsewhere classified, M62.81   Muscle Weakness (generalized), R26.89   Other abnormalities of gait and mobility, and R60.9     Edema, unspecified    Referral Source:  Analisa Gutierrez MD Insurance:   Payor: MEDICARE / Plan: MEDICARE PART A AND B / Product Type: *No Product type* /                     Patient  verified yes     Visit #   Current  / Total 16 24   Time   In / Out 9:40 10:48   Total Treatment Time 68   Total Timed Codes 68   1:1 Treatment Time 68       [facility] Totals Reminder:  bill using total billable   min of TIMED therapeutic procedures and modalities.   8-22 min = 1 unit; 23-37 min = 2 units; 38-52 min = 3 units;  53-67 min = 4 units; 68-82 min = 5 units         SUBJECTIVE    Pain Level (0-10 scale): 2-3/10 LE's, heaviness, skin tightness    Any medication changes, allergies to medications, adverse drug reactions, diagnosis change, or new procedure performed?: [x] No    [] Yes (see summary sheet for update)  Medications: Verified on Patient Summary List    Subjective functional status/changes:       Pt arrives using Rolator with daughter and  today for treatment not wearing compression and sneakers.  States she removed them this morning prior to appointment for a full shower.  Stating \"we had a horrible time with these and we can't do it. I will need to be able to put them on\".  Daughter states they have not changed the leg pieces since 4 days ago as they had a hard time with the foot pieces.  Patient emphatic she will need to be the one to put on her own compression as her spouse struggled to manage.  Patient expressing frustration.  Daughter indicated that her parents used the video's and that helped.  Patient

## 2024-02-26 ENCOUNTER — APPOINTMENT (OUTPATIENT)
Facility: HOSPITAL | Age: 89
End: 2024-02-26
Payer: MEDICARE

## 2024-02-27 RX ORDER — LEVOTHYROXINE SODIUM 0.07 MG/1
TABLET ORAL
Qty: 90 TABLET | Refills: 1 | Status: SHIPPED | OUTPATIENT
Start: 2024-02-27

## 2024-03-04 ENCOUNTER — HOSPITAL ENCOUNTER (OUTPATIENT)
Facility: HOSPITAL | Age: 89
Setting detail: RECURRING SERIES
Discharge: HOME | End: 2024-03-07
Payer: MEDICARE

## 2024-03-04 PROCEDURE — 97140 MANUAL THERAPY 1/> REGIONS: CPT

## 2024-03-04 NOTE — PROGRESS NOTES
treatments.  Patient/caregiver will demonstrate improved edema management as evidenced by performing donning/doffing of garments modified independent 3/3 times within session to aid in reducing risk for infection and promote transition to maintenance phase of CDT in 12 weeks.12/12/24 Cont to progress towards goal  2.   Patient will demonstrate decrease in self-perceived functional impairment as evidenced by improved score on Lymphedema Life Impact Scale outcome measure from 63 % impairment to 48 % impairment within 12 weeks.2/12/24 Cont to progress towards goal  PLAN  Yes  Continue plan of care  Re-Cert due: 4/11/2024  PN DUE: 3/12/2024  []  Upgrade activities as tolerated  []  Discharge due to :  [x]  Other: 1.  PN Next time. 2.  Cont MLB 3.  Measure for custom knee highs in 1-2 weeks.     Amy Lombardo PT,DPT, CLT-LINO       3/4/2024       2:55 PM

## 2024-03-07 ENCOUNTER — HOSPITAL ENCOUNTER (OUTPATIENT)
Facility: HOSPITAL | Age: 89
Setting detail: RECURRING SERIES
Discharge: HOME | End: 2024-03-10
Payer: MEDICARE

## 2024-03-07 PROCEDURE — 97140 MANUAL THERAPY 1/> REGIONS: CPT

## 2024-03-07 NOTE — PROGRESS NOTES
[facility]   a part of [facility]  [address], [address]   [place] [address]   Phone: [phone]  Fax: [phone]      PHYSICAL THERAPY PROGRESS NOTE  Patient Name:  Melissa Collins :  1934   Treatment/Medical Diagnosis: Lymphedema, not elsewhere classified [I89.0]   Referral Source:  Analisa Gutierrez MD     Date of Initial Visit:  10/26/23 Attended Visits:  18 Missed Visits:  0     SUMMARY OF TREATMENT/ASSESSMENT:  Patient has been seen for 18 visits of CDT for decongestion of below knee compression with bandages prior to ordering a more adjustable compression device.  Patient has received velcro style compression garments and due to patients inability to don/doff Indep she relies on her spouse to manage LE compression equipment.  Due to dementia the spouse has a hard time despite multiple training sessions and use of velcro directional tabs, and video don/doffing instructions he is unable to manage the velcro with appropriate tension and application.  Decision made last week to return to bandages to decongest the LE's due to elevated volumes and once she is reduced we will measure for custom LE garments that are easier for spouse to don/doff.  The velcro garments will be reserved for night time compression in the future. Skin integrity and condition is still a concern with scaling, dry, yellowish skin slough that is responding to topical use of Ammonium Lactate.  She will need total A care for LE hygiene and compression equipment management. Patient with c/o of knee and upper leg swelling however with mobility concerns will need to be more creative with compression routine once below knee region being managed successfully.    CURRENT STATUS  Short Term Goals: To be accomplished in 12 treatments.  Patient will demonstrate decreased volumetric measurements by 300-400 mL each LE in order to reduce risk for infection, decrease feeling of limb heaviness, to

## 2024-03-07 NOTE — PROGRESS NOTES
PHYSICAL THERAPY/OCCUPATIONAL THERAPY - MEDICARE DAILY TREATMENT NOTE (updated 3/23)      Date: 3/7/2024          Patient Name:  Melissa Collins :  1934   Medical   Diagnosis:  Lymphedema, not elsewhere classified [I89.0] Treatment Diagnosis:  I89.0     Lymphedema, not elsewhere classified, M62.81   Muscle Weakness (generalized), R26.89   Other abnormalities of gait and mobility, and R60.9     Edema, unspecified    Referral Source:  Analisa Gutierrez MD Insurance:   Payor: MEDICARE / Plan: MEDICARE PART A AND B / Product Type: *No Product type* /                     Patient  verified yes     Visit #   Current  / Total 18 24   Time   In / Out 0930 1005   Total Treatment Time 35   Total Timed Codes 35   1:1 Treatment Time 35      [facility] Totals Reminder:  bill using total billable   min of TIMED therapeutic procedures and modalities.   8-22 min = 1 unit; 23-37 min = 2 units; 38-52 min = 3 units;  53-67 min = 4 units; 68-82 min = 5 units         SUBJECTIVE    Pain Level (0-10 scale): 2-3/10 LE's. Reports less foot pain in bandages due to less pressure on calluses    Any medication changes, allergies to medications, adverse drug reactions, diagnosis change, or new procedure performed?: [x] No    [] Yes (see summary sheet for update)  Medications: Verified on Patient Summary List    Subjective functional status/changes:       Pt arrives using Rolator with daughter and  today for treatment after return to bandaging phase.  Patient arrives unbandaged having removed MLB to be able to shower. Not wearing velcro compression either. States legs \"were small when I took the wraps off and are bigger now\".  Agrees to Tx today.    Previously last Dermatology appt recommended:  No further Tx to L lateral leg needed. Suggested Vaseline over scab and to not pick and let scab slowly fall off.      OBJECTIVE    Therapeutic Procedures:  Tx Min Billable or 1:1 Min (if diff from Tx Min) Procedure, Rationale, Specifics

## 2024-03-11 ENCOUNTER — HOSPITAL ENCOUNTER (OUTPATIENT)
Facility: HOSPITAL | Age: 89
Setting detail: RECURRING SERIES
Discharge: HOME | End: 2024-03-14
Payer: MEDICARE

## 2024-03-11 PROCEDURE — 97140 MANUAL THERAPY 1/> REGIONS: CPT

## 2024-03-11 NOTE — PROGRESS NOTES
Patient pleased with compromise at this time[place]      [place] [address] Therapeutic Exercise (timed):  increase ROM, strength, coordination, balance, and proprioception to improve patient's ability to progress to PLOF and address remaining functional goals. (see flow sheet as applicable)    1/29/2024: Patient instructed to continue HEP as noted below, 2x/day as tolerated.    Details if applicable:    Patient and daughter educated on the importance of increasing activity and reducing sedentary lifestyle and impact on LE swelling/lymphedema.  HEP to date: 2-3 x/day 10 reps each LE  Ankle pumps  Ankle rotations  Heel slide  Supine bridge  Seated long arc quad  Seated hip ADD with ball/pillow  Walking frequently 4-5 x/day shorter bouts   35 35    Total Total       Modalities Rationale:     decrease edema, decrease inflammation, decrease pain, increase tissue extensibility, and increase muscle contraction/control to improve patient's ability to progress to PLOF and address remaining functional goals.     NP Vasopneumatic Device:     Body Part: [] R [] L [] Bilateral  Pressure: [] Decreased [] Normal [] Increased  Treatment Cycles: [] 1  [] 2  [] 3   Rationale: To improve lymphatic fluid movement and decrease swelling to improve the patient’s ability to perform ADL and IADL skills and prevent worsening of swelling over time.    Skin assessment post-treatment (if applicable):    [x]  intact    []  redness- no adverse reaction                 []redness - adverse reaction:          Patient Education: [x] Review HEP    [x]  Patient Education billed concurrently with other procedures   [x] MLD Patient Education  instructed in abbreviated self-MLD for upper legs  [] Progressed/Changed HEP based on:   [] positioning   [] Kinesiotape   [x] Skin care   [] wound care   [] other:   [x] Patient/caregiver in multi-layer bandaging donning principles., Precautions., and Handout provided.  Patient / caregiver re-demonstrated bandaging. [] Yes

## 2024-03-14 ENCOUNTER — HOSPITAL ENCOUNTER (OUTPATIENT)
Facility: HOSPITAL | Age: 89
Setting detail: RECURRING SERIES
Discharge: HOME | End: 2024-03-17
Payer: MEDICARE

## 2024-03-14 PROCEDURE — 97140 MANUAL THERAPY 1/> REGIONS: CPT

## 2024-03-14 NOTE — PROGRESS NOTES
PHYSICAL THERAPY/OCCUPATIONAL THERAPY - MEDICARE DAILY TREATMENT NOTE (updated 3/23)      Date: 3/14/2024          Patient Name:  Melissa Collins :  1934   Medical   Diagnosis:  Lymphedema, not elsewhere classified [I89.0] Treatment Diagnosis:  I89.0     Lymphedema, not elsewhere classified, M62.81   Muscle Weakness (generalized), R26.89   Other abnormalities of gait and mobility, and R60.9     Edema, unspecified    Referral Source:  Analisa Gutierrez MD Insurance:   Payor: MEDICARE / Plan: MEDICARE PART A AND B / Product Type: *No Product type* /                     Patient  verified yes     Visit #   Current  / Total 20 24   Time   In / Out 12:25 1300   Total Treatment Time 35   Total Timed Codes 25   1:1 Treatment Time 35      [facility] Totals Reminder:  bill using total billable   min of TIMED therapeutic procedures and modalities.   8-22 min = 1 unit; 23-37 min = 2 units; 38-52 min = 3 units;  53-67 min = 4 units; 68-82 min = 5 units         SUBJECTIVE    Pain Level (0-10 scale): 2-3/10 LE's especially toes with wraps. Reports less foot pain in bandages due to less pressure on calluses    Any medication changes, allergies to medications, adverse drug reactions, diagnosis change, or new procedure performed?: [x] No    [] Yes (see summary sheet for update)  Medications: Verified on Patient Summary List    Subjective functional status/changes:       Pt arrives using Rolator with daughter and  today for treatment after return to bandaging phase. Patients bandages on to appointment. Eager to see if she's ready for measurements. Asking for abbreviated appointment due to spouse's appointment.   Agrees to Tx today.    Previously last Dermatology appt recommended:  No further Tx to L lateral leg needed. Suggested Vaseline over scab and to not pick and let scab slowly fall off.      OBJECTIVE    Therapeutic Procedures:  Tx Min Billable or 1:1 Min (if diff from Tx Min) Procedure, Rationale, Specifics   25

## 2024-03-18 ENCOUNTER — HOSPITAL ENCOUNTER (OUTPATIENT)
Facility: HOSPITAL | Age: 89
Setting detail: RECURRING SERIES
Discharge: HOME | End: 2024-03-21
Payer: MEDICARE

## 2024-03-18 PROCEDURE — 97763 ORTHC/PROSTC MGMT SBSQ ENC: CPT

## 2024-03-18 PROCEDURE — 97140 MANUAL THERAPY 1/> REGIONS: CPT

## 2024-03-18 NOTE — PROGRESS NOTES
in order to reduce risk for infection, decrease feeling of limb heaviness, to reduce fall risk.3/7/24 Cont to progress towards goal  2.   Patient will report bilateral LE extremity pain decrease from 4/10 to 2/10/10 during ambulation house hold distances with Rolator walker.3/7/24  Cont to progress towards goal  3.   Patient to perform 5/5 lymphedema remedial exercises in session with modified independence utilizing HEP handout, in order to promote optimal independence with management of condition,  as well as promote optimal limb volume reduction required for proper fit of donned clothing in 6 weeks.3/7/24 Cont to progress towards goal  4.   Patient/Caregiver to verbalize 3/3 signs and symptoms of infection without external cueing, in order to promote optimal self-management of condition in 6 weeks. 3/7/24 Cont to progress towards goal     Long Term Goals: To be accomplished in 24 treatments.  Patient/caregiver will demonstrate improved edema management as evidenced by performing donning/doffing of garments modified independent 3/3 times within session to aid in reducing risk for infection and promote transition to maintenance phase of CDT in 12 weeks.3/7/24 Cont to progress towards goal  2.   Patient will demonstrate decrease in self-perceived functional impairment as evidenced by improved score on Lymphedema Life Impact Scale outcome measure from 63 % impairment to 48 % impairment within 12 weeks.3/7/24 Cont to progress towards goal       PLAN  Yes  Continue plan of care  Re-Cert due: 4/11/2024  PN DUE: 4/5/24  []  Upgrade activities as tolerated  []  Discharge due to :  [x]  Other: 1. Check garment trial 2. Repeat volumes    Amy Lombardo PT,DPT, DOMINIC-LINO       3/18/2024       2:47 PM

## 2024-03-19 RX ORDER — LABETALOL 200 MG/1
TABLET, FILM COATED ORAL
Qty: 180 TABLET | Refills: 0 | Status: SHIPPED | OUTPATIENT
Start: 2024-03-19

## 2024-03-21 ENCOUNTER — HOSPITAL ENCOUNTER (OUTPATIENT)
Facility: HOSPITAL | Age: 89
Setting detail: RECURRING SERIES
Discharge: HOME | End: 2024-03-24
Payer: MEDICARE

## 2024-03-21 PROCEDURE — 97140 MANUAL THERAPY 1/> REGIONS: CPT

## 2024-03-21 NOTE — PROGRESS NOTES
PHYSICAL THERAPY/OCCUPATIONAL THERAPY - MEDICARE DAILY TREATMENT NOTE (updated 3/23)      Date: 3/21/2024          Patient Name:  Melissa Collins :  1934   Medical   Diagnosis:  Lymphedema, not elsewhere classified [I89.0] Treatment Diagnosis:  I89.0     Lymphedema, not elsewhere classified, M62.81   Muscle Weakness (generalized), R26.89   Other abnormalities of gait and mobility, and R60.9     Edema, unspecified    Referral Source:  Analisa Gutierrez MD Insurance:   Payor: MEDICARE / Plan: MEDICARE PART A AND B / Product Type: *No Product type* /                     Patient  verified yes     Visit #   Current  / Total 22 24   Time   In / Out 9:30 10:05   Total Treatment Time 35   Total Timed Codes 35   1:1 Treatment Time 35      [facility] Totals Reminder:  bill using total billable   min of TIMED therapeutic procedures and modalities.   8-22 min = 1 unit; 23-37 min = 2 units; 38-52 min = 3 units;  53-67 min = 4 units; 68-82 min = 5 units         SUBJECTIVE    Pain Level (0-10 scale): 2-3/10 anterior legs/feet with hygiene and don/doffing of stockings.    Any medication changes, allergies to medications, adverse drug reactions, diagnosis change, or new procedure performed?: [x] No    [] Yes (see summary sheet for update)  Medications: Verified on Patient Summary List    Subjective functional status/changes:     Pt arrives using Rolator with daughter and  today for treatment.  Patient and spouse have been don/doffing custom knee highs daily since Monday.  Patient reports she has been doing the pump daily x 1 hour.  States her  \"was good with the stockings but forgot how to the pump worked\".  Together they were able to manage.  Patient with request to keep visit shorter today as her daughter has an appointment. Patient also cancelling next Thursday due to dental procedures.  Patient and spouse have not used Velcro style garments at all since Monday.  Want to trial going without night compression

## 2024-03-25 ENCOUNTER — HOSPITAL ENCOUNTER (OUTPATIENT)
Facility: HOSPITAL | Age: 89
Setting detail: RECURRING SERIES
Discharge: HOME | End: 2024-03-28
Payer: MEDICARE

## 2024-03-25 PROCEDURE — 97140 MANUAL THERAPY 1/> REGIONS: CPT

## 2024-03-25 NOTE — PROGRESS NOTES
risk for infection, decrease feeling of limb heaviness, to reduce fall risk.3/7/24 Cont to progress towards goal  2.   Patient will report bilateral LE extremity pain decrease from 4/10 to 2/10/10 during ambulation house hold distances with Rolator walker.3/7/24  Cont to progress towards goal  3.   Patient to perform 5/5 lymphedema remedial exercises in session with modified independence utilizing HEP handout, in order to promote optimal independence with management of condition,  as well as promote optimal limb volume reduction required for proper fit of donned clothing in 6 weeks.3/7/24 Cont to progress towards goal  4.   Patient/Caregiver to verbalize 3/3 signs and symptoms of infection without external cueing, in order to promote optimal self-management of condition in 6 weeks. 3/7/24 Cont to progress towards goal     Long Term Goals: To be accomplished in 24 treatments.  Patient/caregiver will demonstrate improved edema management as evidenced by performing donning/doffing of garments modified independent 3/3 times within session to aid in reducing risk for infection and promote transition to maintenance phase of CDT in 12 weeks.3/7/24 Cont to progress towards goal  2.   Patient will demonstrate decrease in self-perceived functional impairment as evidenced by improved score on Lymphedema Life Impact Scale outcome measure from 63 % impairment to 48 % impairment within 12 weeks.3/7/24 Cont to progress towards goal       PLAN  Yes  Continue plan of care  Re-Cert due: 7/20/24  PN DUE: 4/5/24  []  Upgrade activities as tolerated  []  Discharge due to :  [x]  Other: 1. Measure for garments  (custom knee high B/L and Wear ease marlys)    Amy Lombardo PT,DPT, CLT-LINO       3/25/2024       9:13 AM

## 2024-03-25 NOTE — THERAPY RECERTIFICATION
changes/special instructions: ________________________________________________   ___ I have read the above report and request that my patient be discharged from therapy.     Physician's Signature:_________________________   DATE:_________   TIME:________                           Analisa Gutierrez MD    ** Signature, Date and Time must be completed for valid certification **  Please sign and fax to [phone].  Thank you

## 2024-03-28 ENCOUNTER — APPOINTMENT (OUTPATIENT)
Facility: HOSPITAL | Age: 89
End: 2024-03-28
Payer: MEDICARE

## 2024-04-05 ENCOUNTER — HOSPITAL ENCOUNTER (OUTPATIENT)
Facility: HOSPITAL | Age: 89
Setting detail: RECURRING SERIES
Discharge: HOME | End: 2024-04-08
Payer: MEDICARE

## 2024-04-05 PROCEDURE — 97140 MANUAL THERAPY 1/> REGIONS: CPT

## 2024-04-05 PROCEDURE — 97760 ORTHOTIC MGMT&TRAING 1ST ENC: CPT

## 2024-04-05 NOTE — PROGRESS NOTES
PHYSICAL THERAPY/OCCUPATIONAL THERAPY - MEDICARE DAILY TREATMENT NOTE (updated 3/23)      Date: 2024          Patient Name:  Melissa Collins :  1934   Medical   Diagnosis:  Lymphedema, not elsewhere classified [I89.0] Treatment Diagnosis:  I89.0     Lymphedema, not elsewhere classified, M62.81   Muscle Weakness (generalized), R26.89   Other abnormalities of gait and mobility, and R60.9     Edema, unspecified    Referral Source:  Analisa Gutierrez MD Insurance:   Payor: MEDICARE / Plan: MEDICARE PART A AND B / Product Type: *No Product type* /                     Patient  verified yes     Visit #   Current  / Total 24 48 following recert   Time   In / Out 1300 1350   Total Treatment Time 50   Total Timed Codes 40   1:1 Treatment Time 50       [facility] Totals Reminder:  bill using total billable   min of TIMED therapeutic procedures and modalities.   8-22 min = 1 unit; 23-37 min = 2 units; 38-52 min = 3 units;  53-67 min = 4 units; 68-82 min = 5 units         SUBJECTIVE    Pain Level (0-10 scale): 2-3/10 anterior legs/feet with hygiene and don/doffing of stockings.    Any medication changes, allergies to medications, adverse drug reactions, diagnosis change, or new procedure performed?: [x] No    [] Yes (see summary sheet for update)  Medications: Verified on Patient Summary List    Subjective functional status/changes:     Pt arrives to appt without stockings on, says her  wasn't feeling well and she ran out of time to put them on. Pt says she has been using pump 2x/day over for 1 hour sessions.  States her L knee thigh is improving faster than the R leg. Pt has been wearing stockings daily, says she does not wear velcro garments at night because they are difficult for her  to don. Patient agrees to Tx.    Previously last Dermatology appt recommended:  No further Tx to L lateral leg needed. Suggested Vaseline over scab and to not pick and let scab slowly fall off.

## 2024-04-09 ENCOUNTER — APPOINTMENT (OUTPATIENT)
Facility: HOSPITAL | Age: 89
End: 2024-04-09
Payer: MEDICARE

## 2024-04-12 ENCOUNTER — APPOINTMENT (OUTPATIENT)
Facility: HOSPITAL | Age: 89
End: 2024-04-12
Payer: MEDICARE

## 2024-04-16 ENCOUNTER — APPOINTMENT (OUTPATIENT)
Facility: HOSPITAL | Age: 89
End: 2024-04-16
Payer: MEDICARE

## 2024-04-19 ENCOUNTER — APPOINTMENT (OUTPATIENT)
Facility: HOSPITAL | Age: 89
End: 2024-04-19
Payer: MEDICARE

## 2024-04-23 ENCOUNTER — APPOINTMENT (OUTPATIENT)
Facility: HOSPITAL | Age: 89
End: 2024-04-23
Payer: MEDICARE

## 2024-04-25 ENCOUNTER — HOSPITAL ENCOUNTER (OUTPATIENT)
Facility: HOSPITAL | Age: 89
Setting detail: RECURRING SERIES
Discharge: HOME | End: 2024-04-28
Payer: MEDICARE

## 2024-04-25 PROCEDURE — 97535 SELF CARE MNGMENT TRAINING: CPT

## 2024-04-25 PROCEDURE — 97140 MANUAL THERAPY 1/> REGIONS: CPT

## 2024-04-25 NOTE — PROGRESS NOTES
handout, in order to promote optimal independence with management of condition,  as well as promote optimal limb volume reduction required for proper fit of donned clothing in 6 weeks.   Status at last Eval/Progress Note: not met   Current Status: Met 4/25/24  Goal Met?  yes    4. Patient/Caregiver to verbalize 3/3 signs and symptoms of infection without external cueing, in order to promote optimal self-management of condition in 6 weeks.   Status at last Eval/Progress Note: not met   Current Status: Met 4/25/24  Goal Met?  yes    5. Patient/caregiver will demonstrate improved edema management as evidenced by performing donning/doffing of garments modified independent 3/3 times within session to aid in reducing risk for infection and promote transition to maintenance phase of CDT in 12 weeks.   Status at last Eval/Progress Note: not met   Current Status: waiting on arrival of new garments   Goal Met?  no    6. Patient will demonstrate decrease in self-perceived functional impairment as evidenced by improved score on Lymphedema Life Impact Scale outcome measure from 63 % impairment to 48 % impairment within 12 weeks.   Status at last Eval/Progress Note: not met   Current Status: progressing towards goal   Goal Met?  no        RECOMMENDATIONS FOR SKILLED THERAPY  Fit new custom stockings and RM marlys upon receiving.         Jennifer Arriola, PT, DPT, CLT-LINO       4/25/2024       2:05 PM    If you have any questions/comments please contact us directly at [phone].   Thank you for allowing us to assist in the care of your patient.   
-9.38   10/26/23 10,669.42 10,544.63  1.18       Pain Level at end of session (0-10 scale): 0/10 at rest    Assessment   Pt with reduced limb volumes bilaterally today and weight loss. Pt compliant with home compression routine and managing well with current garments. Pt still waiting on custom knee high stockings. Pt agreeable to pay out of pocket for RM deepti since that was unable to be processed through . The patient was measured for a Wear Ease Compression Deepti previous visit to wear with her daytime compression knee highs to address calf, above knee to waist.  She needs this type of compression to address above knee as its easier to don/doff.  The patient will need to be monitored closely due to skin condition is still very fragile and scaling yellowish flaking skin is sloughing and leaving fragile skin underneath. Pt to return for fitting of new custom stockings and RM deepti upon receiving.     Patient will continue to benefit from skilled PT / OT services to address functional mobility deficits, address ROM deficits, address swelling, analyze and address soft tissue restrictions, analyze and cue movement patterns, analyze and modify body mechanics/ergonomics, analyze compression product fit and use, assess and modify postural abnormalities, instruct in home and community integration, instruct in home lymphedema management program, measure for compression products, and modify and progress therapeutic interventions to address functional deficits and attain remaining goals.    Progress toward goals / Updated goals:  [x]  See Progress Note/Re certification  Short Term Goals: To be accomplished in 12 treatments.  Patient will demonstrate decreased volumetric measurements by 300-400 mL each LE in order to reduce risk for infection, decrease feeling of limb heaviness, to reduce fall risk. Met 4/25/24  2.   Patient will report bilateral LE extremity pain decrease from 4/10 to 2/10/10 during ambulation house hold

## 2024-05-01 NOTE — TELEPHONE ENCOUNTER
Patient is following up on her BP medication and her labetalol , states per [facility] they have not received her scripts. Pt \"unable to void\" despite 3 attempts.  Pt unsteady on feet with heavy assist required.

## 2024-05-06 ENCOUNTER — HOSPITAL ENCOUNTER (OUTPATIENT)
Facility: HOSPITAL | Age: 89
Setting detail: RECURRING SERIES
Discharge: HOME | End: 2024-05-09
Payer: MEDICARE

## 2024-05-06 PROCEDURE — 97763 ORTHC/PROSTC MGMT SBSQ ENC: CPT

## 2024-05-06 PROCEDURE — 97535 SELF CARE MNGMENT TRAINING: CPT

## 2024-05-06 NOTE — PROGRESS NOTES
importance of increasing activity and reducing sedentary lifestyle and impact on LE swelling/lymphedema.  HEP to date: 2-3 x/day 10 reps each LE  Ankle pumps  Ankle rotations  Heel slide  Supine bridge  Seated long arc quad  Seated hip ADD with ball/pillow  Walking frequently 4-5 x/day shorter bouts   25     Total Total       Modalities Rationale:     decrease edema, decrease inflammation, decrease pain, increase tissue extensibility, and increase muscle contraction/control to improve patient's ability to progress to PLOF and address remaining functional goals.     NP Vasopneumatic Device:     Body Part: [] R [] L [] Bilateral  Pressure: [] Decreased [] Normal [] Increased  Treatment Cycles: [] 1  [] 2  [] 3   Rationale: To improve lymphatic fluid movement and decrease swelling to improve the patient’s ability to perform ADL and IADL skills and prevent worsening of swelling over time.    Skin assessment post-treatment (if applicable):    [x]  intact    []  redness- no adverse reaction                 []redness - adverse reaction:          Patient Education: [x] Review HEP    [x]  Patient Education billed concurrently with other procedures   [x] MLD Patient Education  instructed in abbreviated self-MLD for upper legs  [] Progressed/Changed HEP based on:   [] positioning   [] Kinesiotape   [x] Skin care   [] wound care   [] other:   [x] Patient/caregiver in multi-layer bandaging donning principles., Precautions., and Handout provided.  Patient / caregiver re-demonstrated bandaging. [] Yes  [x] No  Compression bandaging/garment precautions reviewed: [x] Yes  [] No      Other Objective/Functional Measures  Observation:  3/21/24:      B/L anterior legs    12/18/23    B/L LE     Left Lateral LEG       R lateral Leg    3/7/24  Lower Extremity Girth (cm):  Right    Left      MTP:     Midfoot/navicular: 24 23   Ankle: 25.2 23.2           Calf     (largest) 44 41   Knee   (patella) 52 47.4   Thigh  (mid) 53.5 50   Groin:

## 2024-05-23 ENCOUNTER — HOSPITAL ENCOUNTER (OUTPATIENT)
Facility: HOSPITAL | Age: 89
Setting detail: RECURRING SERIES
Discharge: HOME | End: 2024-05-26
Payer: MEDICARE

## 2024-05-23 PROCEDURE — 97140 MANUAL THERAPY 1/> REGIONS: CPT

## 2024-05-28 ENCOUNTER — APPOINTMENT (OUTPATIENT)
Facility: HOSPITAL | Age: 89
End: 2024-05-28
Payer: MEDICARE

## 2024-06-04 ENCOUNTER — APPOINTMENT (OUTPATIENT)
Facility: HOSPITAL | Age: 89
End: 2024-06-04
Payer: MEDICARE

## 2024-06-11 ENCOUNTER — HOSPITAL ENCOUNTER (OUTPATIENT)
Facility: HOSPITAL | Age: 89
Setting detail: RECURRING SERIES
Discharge: HOME | End: 2024-06-14

## 2024-06-17 ENCOUNTER — HOSPITAL ENCOUNTER (OUTPATIENT)
Facility: HOSPITAL | Age: 89
Setting detail: RECURRING SERIES
Discharge: HOME | End: 2024-06-20
Payer: MEDICARE

## 2024-06-17 PROCEDURE — 97763 ORTHC/PROSTC MGMT SBSQ ENC: CPT

## 2024-06-17 NOTE — PROGRESS NOTES
[facility]   a part of [facility]  [address], [address]   [place] [address]   Phone: [phone]  Fax: [phone]      PHYSICAL THERAPY PROGRESS NOTE  Patient Name:  Melissa Collins :  1934   Treatment/Medical Diagnosis: Lymphedema, not elsewhere classified [I89.0]   Referral Source:  Analisa Gutierrez MD     Date of Initial Visit:  10/26/23 Attended Visits:  28 Missed Visits:  0     SUMMARY OF TREATMENT/ASSESSMENT:  Fit pt's WearEase marlys, size 1XL. Good fit noted through abdomen, but still slightly loose in the thighs. Pt reports garment \"felt good.\" Pt previously tried size XL which was intolerable due to c/o being \"too tight\" then tried size 2XL which was far too loose. Pt agreeable to wear marlys during the day to help with upper leg/abdominal swelling. Advised pt never to wear marlys without custom stockings but she can wear stockings without marlys. Pt advised not to sleep in marlys or wear it when using her pump. Pt and family verbalized understanding of all instructions. Pt to return for garment/volume recheck in 2 weeks. If volumes stable and pt managing well at home, will discharge to phase II CDT. Pt has met 5/6 PT goals.     CURRENT STATUS/GOALS    Patient will demonstrate decreased volumetric measurements by 300-400 mL each LE in order to reduce risk for infection, decrease feeling of limb heaviness, to reduce fall risk.   Status at last Eval/Progress Note: not met   Current Status: Met 24  Goal Met?  yes    2.  Patient will report bilateral LE extremity pain decrease from 4/10 to 2/10/10 during ambulation house hold distances with Rolator walker.   Status at last Eval/Progress Note: not met   Current Status: Met 24  Goal Met?  yes    3. Patient to perform 5/5 lymphedema remedial exercises in session with modified independence utilizing HEP handout, in order to promote optimal independence with management of condition,  as well as 
assist donning.  PT will hold on today's measurement until next appointment.    LE Bilateral, foot, ankle, and calf Knee high, Top band silicone border, Closed Toe, and Other:      Style: Flat knit    Brand: [employer]orum    Type: Custom: Expert Class 1, Beige    Vendor:  [employer]    Education: Educated patient in compression garment donning and doffing., Glove use with donning., Daily wear schedule., Daily laundering., Educated patient to monitor for redness or pressure points on the skin., and New compression routine as follows:    AM: shower, lotion, pump x 1 hour.  Apply compression stockings b/l LE.  Wear until bedtime.  Remove.  Apply Velcro compression foot and lower leg pieces B/L LE.  Remove in am and reapply compression stockings.  IF not tolerating Newest R Knee high, apply older knee high in place and follow above routine until she can meet in clinic again[place]     [place] [address] Therapeutic Exercise (timed):  increase ROM, strength, coordination, balance, and proprioception to improve patient's ability to progress to PLOF and address remaining functional goals. (see flow sheet as applicable)    1/29/2024: Patient instructed to continue HEP as noted below, 2x/day as tolerated.    Details if applicable:    Patient and daughter educated on the importance of increasing activity and reducing sedentary lifestyle and impact on LE swelling/lymphedema.  HEP to date: 2-3 x/day 10 reps each LE  Ankle pumps  Ankle rotations  Heel slide  Supine bridge  Seated long arc quad  Seated hip ADD with ball/pillow  Walking frequently 4-5 x/day shorter bouts   12     Total Total       Modalities Rationale:     decrease edema, decrease inflammation, decrease pain, increase tissue extensibility, and increase muscle contraction/control to improve patient's ability to progress to PLOF and address remaining functional goals.     NP Vasopneumatic Device:     Body Part: [] R [] L [] Bilateral  Pressure: [] Decreased [] Normal []

## 2024-07-08 ENCOUNTER — TELEPHONE (OUTPATIENT)
Age: 89
End: 2024-07-08

## 2024-07-08 RX ORDER — DILTIAZEM HYDROCHLORIDE 240 MG/1
240 CAPSULE, COATED, EXTENDED RELEASE ORAL DAILY
Qty: 90 CAPSULE | Refills: 2 | Status: SHIPPED | OUTPATIENT
Start: 2024-07-08

## 2024-07-08 NOTE — TELEPHONE ENCOUNTER
Patient's daughter Kelsea is calling because her mother needs a refill on Diltiazem 240 mg.Pharmacy is confirmed.    [phone] cell Kelsea (daughter)

## 2024-07-08 NOTE — TELEPHONE ENCOUNTER
Refill per VO of Dr. Al  Last appt: 2/15/2024    Future Appointments   Date Time Provider Department Center   8/8/2024 11:30 AM Analisa Gutierrez MD [facility]   8/19/2024  2:30 PM [facility] ECHO 1 CAV[facility]   8/19/2024  3:40 PM Francesco Al MD [facility]   9/10/2024 12:15 PM Jennifer Arriola, PT [facility]       Requested Prescriptions     Signed Prescriptions Disp Refills    dilTIAZem (CARDIZEM CD) 240 MG extended release capsule 90 capsule 2     Sig: Take 1 capsule by mouth daily     Authorizing Provider: FRANCESCO AL     Ordering User: NAMRATA WATSON

## 2024-07-09 RX ORDER — DILTIAZEM HYDROCHLORIDE 240 MG/1
240 CAPSULE, COATED, EXTENDED RELEASE ORAL DAILY
Qty: 90 CAPSULE | Refills: 0 | OUTPATIENT
Start: 2024-07-09

## 2024-08-12 DIAGNOSIS — I89.0 LYMPHEDEMA, NOT ELSEWHERE CLASSIFIED: Primary | ICD-10-CM

## 2024-08-13 RX ORDER — LEVOTHYROXINE SODIUM 0.07 MG/1
TABLET ORAL
Qty: 90 TABLET | Refills: 0 | Status: SHIPPED | OUTPATIENT
Start: 2024-08-13

## 2024-08-27 RX ORDER — LEVOTHYROXINE SODIUM 75 UG/1
75 TABLET ORAL
Qty: 90 TABLET | Refills: 0 | Status: SHIPPED | OUTPATIENT
Start: 2024-08-27

## 2024-08-27 NOTE — PROGRESS NOTES
Melissa Collins (:  1934) is a 89 y.o. female,Established patient, here for evaluation of the following chief complaint(s):  Follow-up (6 month follow- up. Still having stomach issues after hernia surgery. Pt is fasting)          Diagnosis Orders   1. Essential (primary) hypertension  Lipid Panel      2. Acquired hypothyroidism  TSH    T4, Free      3. Recurrent UTI        4. Lymphedema, not elsewhere classified        5. PAF (paroxysmal atrial fibrillation) (HCC)        6. Diarrhea, unspecified type  CT ABDOMEN PELVIS WO CONTRAST Additional Contrast? Radiologist Recommendation      7. Stage 3b chronic kidney disease (HCC)  Comprehensive Metabolic Panel      8. Generalized abdominal pain  CT ABDOMEN PELVIS WO CONTRAST Additional Contrast? Radiologist Recommendation    CBC        labetolol  Synthroid  Myrbetriq, estrace cream  Stockings- wrapped   Dilt, eliquis-will discuss coming off of it   Doing? Check  check     No follow-ups on file.  Assessment & Plan  1. Abdominal pain.  She reports chronic abdominal pain, worsened since her hernia surgery. A CT scan of the abdomen will be ordered to investigate the cause. Blood work will also be conducted today.     2. Change in bowel habits.  She experiences constipation for several days followed by diarrhea. The continuation of Metamucil is advised to help regulate bowel movements.    3. Weight loss.  She has lost 30 pounds since January, which is concerning. The CT scan and blood work will help determine if there is an underlying cause. We need to evaluate abdominal pain, weight loss and change in bowels     4. Lymphedema.  Her legs have significantly reduced in size after attending the lymphedema clinic. She will continue with the current management plan.    5. Hypertension.  Her hypertension is well controlled with labetalol 200 mg twice a day and diltiazem once a day. She will continue her current medication regimen.    6. Atrial fibrillation.  She is taking

## 2024-08-29 ENCOUNTER — OFFICE VISIT (OUTPATIENT)
Age: 89
End: 2024-08-29
Payer: MEDICARE

## 2024-08-29 VITALS
SYSTOLIC BLOOD PRESSURE: 115 MMHG | HEIGHT: 62 IN | BODY MASS INDEX: 24.29 KG/M2 | HEART RATE: 72 BPM | WEIGHT: 132 LBS | RESPIRATION RATE: 16 BRPM | TEMPERATURE: 97.7 F | OXYGEN SATURATION: 99 % | DIASTOLIC BLOOD PRESSURE: 91 MMHG

## 2024-08-29 DIAGNOSIS — N18.32 STAGE 3B CHRONIC KIDNEY DISEASE (HCC): ICD-10-CM

## 2024-08-29 DIAGNOSIS — E03.9 ACQUIRED HYPOTHYROIDISM: ICD-10-CM

## 2024-08-29 DIAGNOSIS — R19.7 DIARRHEA, UNSPECIFIED TYPE: ICD-10-CM

## 2024-08-29 DIAGNOSIS — I10 ESSENTIAL (PRIMARY) HYPERTENSION: Primary | ICD-10-CM

## 2024-08-29 DIAGNOSIS — I10 ESSENTIAL (PRIMARY) HYPERTENSION: ICD-10-CM

## 2024-08-29 DIAGNOSIS — I48.0 PAF (PAROXYSMAL ATRIAL FIBRILLATION) (HCC): ICD-10-CM

## 2024-08-29 DIAGNOSIS — R10.84 GENERALIZED ABDOMINAL PAIN: ICD-10-CM

## 2024-08-29 DIAGNOSIS — N39.0 RECURRENT UTI: ICD-10-CM

## 2024-08-29 DIAGNOSIS — I89.0 LYMPHEDEMA, NOT ELSEWHERE CLASSIFIED: ICD-10-CM

## 2024-08-29 LAB
ALBUMIN SERPL-MCNC: 3.6 G/DL (ref 3.5–5)
ALBUMIN/GLOB SERPL: 1.1 (ref 1.1–2.2)
ALP SERPL-CCNC: 77 U/L (ref 45–117)
ALT SERPL-CCNC: 14 U/L (ref 12–78)
ANION GAP SERPL CALC-SCNC: 3 MMOL/L (ref 5–15)
AST SERPL-CCNC: 13 U/L (ref 15–37)
BILIRUB SERPL-MCNC: 0.7 MG/DL (ref 0.2–1)
BUN SERPL-MCNC: 32 MG/DL (ref 6–20)
BUN/CREAT SERPL: 20 (ref 12–20)
CALCIUM SERPL-MCNC: 9.7 MG/DL (ref 8.5–10.1)
CHLORIDE SERPL-SCNC: 110 MMOL/L (ref 97–108)
CHOLEST SERPL-MCNC: 102 MG/DL
CO2 SERPL-SCNC: 26 MMOL/L (ref 21–32)
CREAT SERPL-MCNC: 1.64 MG/DL (ref 0.55–1.02)
ERYTHROCYTE [DISTWIDTH] IN BLOOD BY AUTOMATED COUNT: 14.2 % (ref 11.5–14.5)
GLOBULIN SER CALC-MCNC: 3.2 G/DL (ref 2–4)
GLUCOSE SERPL-MCNC: 106 MG/DL (ref 65–100)
HCT VFR BLD AUTO: 34.8 % (ref 35–47)
HDLC SERPL-MCNC: 51 MG/DL
HDLC SERPL: 2 (ref 0–5)
HGB BLD-MCNC: 11.2 G/DL (ref 11.5–16)
LDLC SERPL CALC-MCNC: 39.6 MG/DL (ref 0–100)
MCH RBC QN AUTO: 30.7 PG (ref 26–34)
MCHC RBC AUTO-ENTMCNC: 32.2 G/DL (ref 30–36.5)
MCV RBC AUTO: 95.3 FL (ref 80–99)
NRBC # BLD: 0 K/UL (ref 0–0.01)
NRBC BLD-RTO: 0 PER 100 WBC
PLATELET # BLD AUTO: 234 K/UL (ref 150–400)
PMV BLD AUTO: 10.5 FL (ref 8.9–12.9)
POTASSIUM SERPL-SCNC: 5 MMOL/L (ref 3.5–5.1)
PROT SERPL-MCNC: 6.8 G/DL (ref 6.4–8.2)
RBC # BLD AUTO: 3.65 M/UL (ref 3.8–5.2)
SODIUM SERPL-SCNC: 139 MMOL/L (ref 136–145)
T4 FREE SERPL-MCNC: 1.4 NG/DL (ref 0.8–1.5)
TRIGL SERPL-MCNC: 57 MG/DL
TSH SERPL DL<=0.05 MIU/L-ACNC: 2.85 UIU/ML (ref 0.36–3.74)
VLDLC SERPL CALC-MCNC: 11.4 MG/DL
WBC # BLD AUTO: 5.8 K/UL (ref 3.6–11)

## 2024-08-29 PROCEDURE — 1036F TOBACCO NON-USER: CPT | Performed by: INTERNAL MEDICINE

## 2024-08-29 PROCEDURE — 1123F ACP DISCUSS/DSCN MKR DOCD: CPT | Performed by: INTERNAL MEDICINE

## 2024-08-29 PROCEDURE — 99214 OFFICE O/P EST MOD 30 MIN: CPT | Performed by: INTERNAL MEDICINE

## 2024-08-29 PROCEDURE — 1090F PRES/ABSN URINE INCON ASSESS: CPT | Performed by: INTERNAL MEDICINE

## 2024-08-29 PROCEDURE — G8420 CALC BMI NORM PARAMETERS: HCPCS | Performed by: INTERNAL MEDICINE

## 2024-08-29 PROCEDURE — G8427 DOCREV CUR MEDS BY ELIG CLIN: HCPCS | Performed by: INTERNAL MEDICINE

## 2024-09-04 ENCOUNTER — HOSPITAL ENCOUNTER (OUTPATIENT)
Facility: HOSPITAL | Age: 89
Discharge: HOME | End: 2024-09-07
Attending: INTERNAL MEDICINE
Payer: MEDICARE

## 2024-09-04 DIAGNOSIS — R19.7 DIARRHEA, UNSPECIFIED TYPE: ICD-10-CM

## 2024-09-04 DIAGNOSIS — R10.84 GENERALIZED ABDOMINAL PAIN: ICD-10-CM

## 2024-09-04 PROCEDURE — 74176 CT ABD & PELVIS W/O CONTRAST: CPT

## 2024-09-10 ENCOUNTER — HOSPITAL ENCOUNTER (OUTPATIENT)
Facility: HOSPITAL | Age: 89
Setting detail: RECURRING SERIES
Discharge: HOME | End: 2024-09-13
Payer: MEDICARE

## 2024-09-10 PROCEDURE — 97535 SELF CARE MNGMENT TRAINING: CPT

## 2024-09-10 PROCEDURE — 97140 MANUAL THERAPY 1/> REGIONS: CPT

## 2024-09-10 PROCEDURE — 97161 PT EVAL LOW COMPLEX 20 MIN: CPT

## 2024-09-12 ENCOUNTER — TELEPHONE (OUTPATIENT)
Age: 89
End: 2024-09-12

## 2024-09-19 RX ORDER — LABETALOL 200 MG/1
TABLET, FILM COATED ORAL
Qty: 180 TABLET | Refills: 0 | Status: SHIPPED | OUTPATIENT
Start: 2024-09-19

## 2024-11-12 NOTE — TELEPHONE ENCOUNTER
Refill per VO of Dr. Al  Last appt: 2/15/2024    NV needed x6 mos w echo    Patient Cancel > 24 HRS (pt will call have eco and daughter will reschedule follow up at a later date)     Future Appointments   Date Time Provider Department Center   12/9/2024 11:00 AM [facility] ECHO 2 CAVSF BS AMB   12/18/2024 10:45 AM Jennifer Arriola, PT [facility]   2/27/2025 11:30 AM Analisa Gutierrez MD [facility] ECC DEP       Requested Prescriptions     Signed Prescriptions Disp Refills    apixaban (ELIQUIS) 2.5 MG TABS tablet 180 tablet 3     Sig: Take 1 tablet by mouth 2 times daily     Authorizing Provider: FRANCESCO AL     Ordering User: SHAKEEL STEIN VO, decreased dose d/t weight loss, age, Cr fx.  Called pt's daughter (HIPAA approved) to inform of dose change.  LVM, sent [employer] msg

## 2024-11-18 ENCOUNTER — TELEPHONE (OUTPATIENT)
Age: 89
End: 2024-11-18

## 2024-11-18 NOTE — TELEPHONE ENCOUNTER
S/w pt to schedule follow up - she said Dr Al was going to change her meds - did he call Eliquis to [place]

## 2024-11-20 DIAGNOSIS — I89.0 LYMPHEDEMA: Primary | ICD-10-CM

## 2024-12-09 ENCOUNTER — ANCILLARY PROCEDURE (OUTPATIENT)
Age: 88
End: 2024-12-09
Payer: MEDICARE

## 2024-12-09 VITALS
SYSTOLIC BLOOD PRESSURE: 110 MMHG | BODY MASS INDEX: 24.29 KG/M2 | HEIGHT: 62 IN | HEART RATE: 58 BPM | DIASTOLIC BLOOD PRESSURE: 74 MMHG | WEIGHT: 132 LBS

## 2024-12-09 DIAGNOSIS — I48.19 PERSISTENT ATRIAL FIBRILLATION (HCC): ICD-10-CM

## 2024-12-09 DIAGNOSIS — R06.09 DOE (DYSPNEA ON EXERTION): ICD-10-CM

## 2024-12-09 DIAGNOSIS — I10 HTN (HYPERTENSION), BENIGN: ICD-10-CM

## 2024-12-09 LAB
ECHO AO ASC DIAM: 3.4 CM
ECHO AO ASCENDING AORTA INDEX: 2.13 CM/M2
ECHO AO ROOT DIAM: 3.3 CM
ECHO AO ROOT INDEX: 2.06 CM/M2
ECHO AV AREA PEAK VELOCITY: 2.3 CM2
ECHO AV AREA VTI: 2.5 CM2
ECHO AV AREA/BSA PEAK VELOCITY: 1.4 CM2/M2
ECHO AV AREA/BSA VTI: 1.6 CM2/M2
ECHO AV MEAN GRADIENT: 5 MMHG
ECHO AV MEAN VELOCITY: 1 M/S
ECHO AV PEAK GRADIENT: 9 MMHG
ECHO AV PEAK VELOCITY: 1.5 M/S
ECHO AV VELOCITY RATIO: 0.87
ECHO AV VTI: 32.4 CM
ECHO BSA: 1.62 M2
ECHO EST RA PRESSURE: 3 MMHG
ECHO LA DIAMETER INDEX: 3.44 CM/M2
ECHO LA DIAMETER: 5.5 CM
ECHO LA TO AORTIC ROOT RATIO: 1.67
ECHO LA VOL A-L A2C: 123 ML (ref 22–52)
ECHO LA VOL A-L A4C: 117 ML (ref 22–52)
ECHO LA VOL MOD A2C: 119 ML (ref 22–52)
ECHO LA VOL MOD A4C: 112 ML (ref 22–52)
ECHO LA VOLUME AREA LENGTH: 122 ML
ECHO LA VOLUME INDEX A-L A2C: 77 ML/M2 (ref 16–34)
ECHO LA VOLUME INDEX A-L A4C: 73 ML/M2 (ref 16–34)
ECHO LA VOLUME INDEX AREA LENGTH: 76 ML/M2 (ref 16–34)
ECHO LA VOLUME INDEX MOD A2C: 74 ML/M2 (ref 16–34)
ECHO LA VOLUME INDEX MOD A4C: 70 ML/M2 (ref 16–34)
ECHO LV EDV A2C: 82 ML
ECHO LV EDV A4C: 97 ML
ECHO LV EDV BP: 91 ML (ref 56–104)
ECHO LV EDV INDEX A4C: 61 ML/M2
ECHO LV EDV INDEX BP: 57 ML/M2
ECHO LV EDV NDEX A2C: 51 ML/M2
ECHO LV EJECTION FRACTION A2C: 62 %
ECHO LV EJECTION FRACTION A4C: 65 %
ECHO LV EJECTION FRACTION BIPLANE: 64 % (ref 55–100)
ECHO LV ESV A2C: 31 ML
ECHO LV ESV A4C: 34 ML
ECHO LV ESV BP: 33 ML (ref 19–49)
ECHO LV ESV INDEX A2C: 19 ML/M2
ECHO LV ESV INDEX A4C: 21 ML/M2
ECHO LV ESV INDEX BP: 21 ML/M2
ECHO LV FRACTIONAL SHORTENING: 38 % (ref 28–44)
ECHO LV INTERNAL DIMENSION DIASTOLE INDEX: 3.5 CM/M2
ECHO LV INTERNAL DIMENSION DIASTOLIC: 5.6 CM (ref 3.9–5.3)
ECHO LV INTERNAL DIMENSION SYSTOLIC INDEX: 2.19 CM/M2
ECHO LV INTERNAL DIMENSION SYSTOLIC: 3.5 CM
ECHO LV IVSD: 0.8 CM (ref 0.6–0.9)
ECHO LV MASS 2D: 152.3 G (ref 67–162)
ECHO LV MASS INDEX 2D: 95.2 G/M2 (ref 43–95)
ECHO LV POSTERIOR WALL DIASTOLIC: 0.7 CM (ref 0.6–0.9)
ECHO LV RELATIVE WALL THICKNESS RATIO: 0.25
ECHO LVOT AREA: 2.8 CM2
ECHO LVOT AV VTI INDEX: 0.89
ECHO LVOT DIAM: 1.9 CM
ECHO LVOT MEAN GRADIENT: 3 MMHG
ECHO LVOT PEAK GRADIENT: 6 MMHG
ECHO LVOT PEAK VELOCITY: 1.3 M/S
ECHO LVOT STROKE VOLUME INDEX: 50.8 ML/M2
ECHO LVOT SV: 81.3 ML
ECHO LVOT VTI: 28.7 CM
ECHO MV AREA VTI: 2.6 CM2
ECHO MV E VELOCITY: 1.17 M/S
ECHO MV LVOT VTI INDEX: 1.07
ECHO MV MAX VELOCITY: 1.1 M/S
ECHO MV MEAN GRADIENT: 2 MMHG
ECHO MV MEAN VELOCITY: 0.5 M/S
ECHO MV PEAK GRADIENT: 5 MMHG
ECHO MV VTI: 30.8 CM
ECHO PERICARDIUM POSTERIOR DIMENSION: 0.7 CM
ECHO RA AREA 4C: 27 CM2
ECHO RA END SYSTOLIC VOLUME APICAL 4 CHAMBER INDEX BSA: 59 ML/M2
ECHO RA VOLUME: 95 ML
ECHO RIGHT VENTRICULAR SYSTOLIC PRESSURE (RVSP): 39 MMHG
ECHO RV INTERNAL DIMENSION: 4.5 CM
ECHO RV TAPSE: 1.7 CM (ref 1.7–?)
ECHO TV REGURGITANT MAX VELOCITY: 3 M/S
ECHO TV REGURGITANT PEAK GRADIENT: 36 MMHG

## 2024-12-09 PROCEDURE — 93306 TTE W/DOPPLER COMPLETE: CPT | Performed by: SPECIALIST

## 2024-12-18 ENCOUNTER — HOSPITAL ENCOUNTER (OUTPATIENT)
Facility: HOSPITAL | Age: 88
Setting detail: RECURRING SERIES
End: 2024-12-18
Payer: MEDICARE

## 2025-01-13 RX ORDER — LABETALOL 200 MG/1
TABLET, FILM COATED ORAL
Qty: 180 TABLET | Refills: 0 | Status: SHIPPED | OUTPATIENT
Start: 2025-01-13

## 2025-01-28 ENCOUNTER — APPOINTMENT (OUTPATIENT)
Facility: HOSPITAL | Age: 89
End: 2025-01-28
Payer: MEDICARE

## 2025-01-28 ENCOUNTER — HOSPITAL ENCOUNTER (OUTPATIENT)
Facility: HOSPITAL | Age: 89
Setting detail: RECURRING SERIES
Discharge: HOME | End: 2025-01-31
Payer: MEDICARE

## 2025-01-28 PROCEDURE — 97535 SELF CARE MNGMENT TRAINING: CPT

## 2025-01-28 PROCEDURE — 97161 PT EVAL LOW COMPLEX 20 MIN: CPT

## 2025-01-28 NOTE — THERAPY EVALUATION
Statement of Medical Necessity    Page 1 of 2      Melissa Collins 1934 Today's Date: 1/28/2025 ELOISE: [employer]   Payor: MEDICARE / Plan: MEDICARE PART A AND B / Product Type: *No Product type* /  ME: TBD  Refills: 2               Diagnosis  []   I97.2 Post-Mastectomy Lymphedema []   I87.2 Venous Insufficiency   [x]   I89.0 Lymphedema, other secondary  []   I83.019 Venous Stasis Ulcer LE, Right   []   I89.9 Unspecified Lymphatic Disorder []   I83.029 Venous Stasis Ulcer LE, Left   [x]   R60.9 Swelling not relieved by elevation []   Q82.0 Hereditary/ Congenital Lymphedema   []   C50.211 Malignant neoplasm of breast, Right []   G89.3  Cancer associated pain   []   C50.212 Malignant neoplasm of breast, Left []   L03.115 LE Cellulitis, Right   []    []   L03.116 LE Cellulitis, Left                                                           Melissa Collins    1934  Page 2 of 2    Physician Order for DME for Diagnosis of I89.0 as Listed on Statement of Medical Necessity, Page 1        Recommended Product:  Units Upper Extremity Rt Lt Units Lower Extremity Rt Lt    Circ-Aid, Ready Wrap, Sigvaris Arm    Inelastic binders (Circ-Aid, Farrow)  []Foot   []Below Knee   []Knee   []Thigh      Circ-Aid Ready Wrap, Sigvaris hand    Gunnar Duc, night use []Full Leg  []Lower Leg      Tribute Arm, night use    Tribute, night use  []Full Leg  []Lower Leg      Gunnar Duc Arm, night use    Edison Sleeve Leg/ Foot, night use      Gradiant Compression Sleeves & Gloves  []Custom [] RM Arm:  []CCL1 []CCL2 []CCL3  []Custom [] RM Glove: []CCL1 []CCL2 []CCL3     6 Gradient Compression Stockings   [x]Custom  []RM Lower Extremity:   [x]CCL1       [x]CCL2         []CCL3   []Knee       []Thigh        []Waist Length x x    Edison sleeve arm w/ hand, night use    Tribute Wrap, night use      Compression Bra          Compression Vest         The above patient was referred for treatment of Lymphedema due to the diagnosis indicated above.  Lymphedema is a 
improved score on Lymphedema Life Impact Scale outcome measure from 43% impairment to 33% impairment within 12 weeks.  3.   Patient and/or caregiver will demonstrate independence and compliance with daily compression garment wear and use of pump 3-5x/week for continued volume reduction and prevention of re-accumulation of fluid during maintenance phase of CDT within 12 weeks.     Frequency / Duration: Patient to be seen 1-2 times per week for 12 treatments.    Patient/ Caregiver education and instruction: Diagnosis, prognosis, self care, exercises, and other all components of CDT.        Certification Period: 1/28/25 - 04/28/25       Jennifer Arriola, PT, DPT, CLT-LINO      1/28/2025       2:04 PM        ===================================================================  I certify that the above Therapy Services are being furnished while the patient is under my care. I agree with the treatment plan and certify that this therapy is necessary.    Physician's Signature:_________________________   DATE:_________   TIME:________                           Analisa Gutierrez MD    ** Signature, Date and Time must be completed for valid certification **  Please sign and fax to [phone].  Thank you

## 2025-02-12 ENCOUNTER — APPOINTMENT (OUTPATIENT)
Facility: HOSPITAL | Age: 89
End: 2025-02-12
Payer: MEDICARE

## 2025-02-24 ENCOUNTER — HOSPITAL ENCOUNTER (OUTPATIENT)
Facility: HOSPITAL | Age: 89
Setting detail: RECURRING SERIES
Discharge: HOME | End: 2025-02-27
Payer: MEDICARE

## 2025-02-24 PROCEDURE — 97140 MANUAL THERAPY 1/> REGIONS: CPT

## 2025-02-24 PROCEDURE — 97760 ORTHOTIC MGMT&TRAING 1ST ENC: CPT

## 2025-02-24 NOTE — PROGRESS NOTES
PHYSICAL THERAPY/OCCUPATIONAL THERAPY - MEDICARE DAILY TREATMENT NOTE (updated 3/23)      Date: 2025          Patient Name:  Melissa Collins :  1934   Medical   Diagnosis:  Lymphedema, not elsewhere classified [I89.0] Treatment Diagnosis:  I89.0     Lymphedema, not elsewhere classified, R26.89   Other abnormalities of gait and mobility, and R60.9     Edema, unspecified    Referral Source:  Analisa Gutierrez MD Insurance:   Payor: MEDICARE / Plan: MEDICARE PART A AND B / Product Type: *No Product type* /                     Patient  verified yes     Visit #   Current  / Total 2 12 per POC   Time   In / Out 1215 1300   Total Treatment Time 45   Total Timed Codes 45   1:1 Treatment Time 45      [facility] Totals Reminder:  bill using total billable   min of TIMED therapeutic procedures and modalities.   8-22 min = 1 unit; 23-37 min = 2 units; 38-52 min = 3 units;  53-67 min = 4 units; 68-82 min = 5 units         SUBJECTIVE    Pain Level (0-10 scale): 4/10, LE heaviness, tightness     Any medication changes, allergies to medications, adverse drug reactions, diagnosis change, or new procedure performed?: [x] No    [] Yes (see summary sheet for update)  Medications: Verified on Patient Summary List    Subjective functional status/changes:     Pt arrives to appt wearing custom stockings, accompanied by spouse and daughter. Pt says she has resumed daily wear of stockings and use of pump 3x/week. Pt notes continued swelling in her legs and feels it is progressing to her thighs and abdomen. Pt denies any changes to medications or medical history. Pt's daughter concerned that pt is having difficulty even getting her legs into the car. Pt has appt with her PCP this Thursday. Encouraged pt/family to mention increased swelling to PCP to be sure no change in medical status. Pt denies shortness of breath. Pt's daughter feels that pt's swelling increases in the winter because she is more sedentary. Pt agreeable to

## 2025-02-24 NOTE — PROGRESS NOTES
[facility]   a part of [facility]  [address], [address]   [place] [address]   Phone: [phone]  Fax: [phone]      PHYSICAL THERAPY PROGRESS NOTE  Patient Name:  Melissa Collins :  1934   Treatment/Medical Diagnosis: Lymphedema, not elsewhere classified [I89.0]   Referral Source:  Analisa Gutierrez MD     Date of Initial Visit:  25 Attended Visits:  2 Missed Visits:  0     SUMMARY OF TREATMENT/ASSESSMENT:  Patient presents with increased swelling of left LE since last seen 1 month ago; bilateral LE limb volumes significantly increased since 2024. Decision made to defer garment measurements this visit due to increased swelling and 4+ pitting edema. Pt to resume bandaging treatment in clinic and will plan to measure for replacement custom garments once limb volumes reduced. Pt to bring all bandaging supplies next visit to resume MLB. Encouraged pt to also [facility] to ensure no change in medical status that would contribute to swelling; pt/family in agreement. No PT goals met, goals continued.     CURRENT STATUS/GOALS    Patient will demonstrate decreased volumetric measurements by 400 ml bilateral lower extremities, in order to reduce risk for infection, decrease feeling of limb heaviness, and increase independence/tolerance for ADL completion within 6 weeks.  Status at last Eval/Progress Note: not met   Current Status: ongoing   Goal Met?  no    2.  Patient/Caregiver to verbalize 3/3 signs and symptoms of infection without external cueing, in order to promote optimal self-management of condition in 6 weeks.   Status at last Eval/Progress Note: not met   Current Status: ongoing   Goal Met?  no    3. Patient/caregiver will demonstrate improved edema management as evidenced by performing donning/doffing of garments modified independent 3/3 times within session to aid in reducing risk for

## 2025-02-25 ENCOUNTER — TELEPHONE (OUTPATIENT)
Facility: CLINIC | Age: 89
End: 2025-02-25

## 2025-02-26 ENCOUNTER — APPOINTMENT (OUTPATIENT)
Facility: HOSPITAL | Age: 89
End: 2025-02-26
Payer: MEDICARE

## 2025-02-27 ENCOUNTER — TELEPHONE (OUTPATIENT)
Facility: CLINIC | Age: 89
End: 2025-02-27

## 2025-02-27 NOTE — TELEPHONE ENCOUNTER
----- Message from Dr. Analisa Gutierrez MD sent at 2/27/2025  6:30 AM EST -----  Regarding: FW: ECC Appointment Request  She had an appt today? She must have cancelled, 15 min appt wellness and med check is fine, anywhere convenient  ----- Message -----  From: Anne Mcneill Veronica  Sent: 2/26/2025   4:04 PM EST  To: #  Subject: ECC Appointment Request                          ECC Appointment Request    Patient needs appointment for ECC Appointment Type: Annual Visit.    Patient Requested Dates(s):1st week of march except Monday   Patient Requested Time:any time  Provider Name:Analisa Gutierrez MD      Reason for Appointment Request: Established Patient - Available appointments did not meet patient need  --------------------------------------------------------------------------------------------------------------------------    Relationship to Patient: Covered Entity/daughter     Call Back Information: OK to leave message on voicemail  Preferred Call Back Number: Phone [phone]

## 2025-02-27 NOTE — TELEPHONE ENCOUNTER
----- Message from Anne MCGRATH sent at 2/26/2025  4:03 PM EST -----  Regarding: ECC Appointment Request  ECC Appointment Request    Patient needs appointment for ECC Appointment Type: Annual Visit.    Patient Requested Dates(s):1st week of march except Monday   Patient Requested Time:any time  Provider Name:Analisa Gutierrez MD      Reason for Appointment Request: Established Patient - Available appointments did not meet patient need  --------------------------------------------------------------------------------------------------------------------------    Relationship to Patient: Covered Entity/daughter     Call Back Information: OK to leave message on voicemail  Preferred Call Back Number: Phone [phone]

## 2025-02-27 NOTE — TELEPHONE ENCOUNTER
May 2, 2023    Edu Rosa  [address]  [place] [address]             [facility]  Pediatrics  [address] [place] [address]  Phone: [phone]   May 2, 2023     Patient: Edu Rosa   YOB: 2021   Date of Visit: 5/2/2023       To Whom it May Concern:    Edu Rosa was seen in my clinic on 5/2/2023. He may return to school 5/2/23.    Please excuse him from any classes or work missed.    If you have any questions or concerns, please don't hesitate to call.    Sincerely,         Srinivasan Arias MD      She wanted to come in Monday, so I slid her in your 230 PM

## 2025-03-01 DIAGNOSIS — E03.9 ACQUIRED HYPOTHYROIDISM: Primary | ICD-10-CM

## 2025-03-02 RX ORDER — LEVOTHYROXINE SODIUM 75 UG/1
75 TABLET ORAL
Qty: 90 TABLET | Refills: 2 | Status: SHIPPED | OUTPATIENT
Start: 2025-03-02

## 2025-03-03 ENCOUNTER — OFFICE VISIT (OUTPATIENT)
Facility: CLINIC | Age: 89
End: 2025-03-03
Payer: MEDICARE

## 2025-03-03 VITALS
HEART RATE: 78 BPM | OXYGEN SATURATION: 95 % | SYSTOLIC BLOOD PRESSURE: 124 MMHG | DIASTOLIC BLOOD PRESSURE: 77 MMHG | TEMPERATURE: 98 F | HEIGHT: 62 IN | BODY MASS INDEX: 27.75 KG/M2 | WEIGHT: 150.8 LBS | RESPIRATION RATE: 16 BRPM

## 2025-03-03 DIAGNOSIS — I48.0 PAF (PAROXYSMAL ATRIAL FIBRILLATION) (HCC): ICD-10-CM

## 2025-03-03 DIAGNOSIS — I10 ESSENTIAL (PRIMARY) HYPERTENSION: ICD-10-CM

## 2025-03-03 DIAGNOSIS — E03.9 ACQUIRED HYPOTHYROIDISM: ICD-10-CM

## 2025-03-03 DIAGNOSIS — R19.7 DIARRHEA, UNSPECIFIED TYPE: ICD-10-CM

## 2025-03-03 DIAGNOSIS — N18.32 STAGE 3B CHRONIC KIDNEY DISEASE (HCC): ICD-10-CM

## 2025-03-03 DIAGNOSIS — Z00.00 MEDICARE ANNUAL WELLNESS VISIT, SUBSEQUENT: Primary | ICD-10-CM

## 2025-03-03 DIAGNOSIS — I89.0 LYMPHEDEMA, NOT ELSEWHERE CLASSIFIED: ICD-10-CM

## 2025-03-03 PROCEDURE — 99214 OFFICE O/P EST MOD 30 MIN: CPT | Performed by: INTERNAL MEDICINE

## 2025-03-03 ASSESSMENT — LIFESTYLE VARIABLES
HOW MANY STANDARD DRINKS CONTAINING ALCOHOL DO YOU HAVE ON A TYPICAL DAY: PATIENT DOES NOT DRINK
HOW OFTEN DO YOU HAVE A DRINK CONTAINING ALCOHOL: NEVER

## 2025-03-03 ASSESSMENT — PATIENT HEALTH QUESTIONNAIRE - PHQ9
SUM OF ALL RESPONSES TO PHQ QUESTIONS 1-9: 2
SUM OF ALL RESPONSES TO PHQ QUESTIONS 1-9: 2
2. FEELING DOWN, DEPRESSED OR HOPELESS: SEVERAL DAYS
SUM OF ALL RESPONSES TO PHQ QUESTIONS 1-9: 2
1. LITTLE INTEREST OR PLEASURE IN DOING THINGS: SEVERAL DAYS
SUM OF ALL RESPONSES TO PHQ QUESTIONS 1-9: 2

## 2025-03-03 NOTE — PROGRESS NOTES
\"Have you been to the ER, urgent care clinic since your last visit?  Hospitalized since your last visit?\"    NO    “Have you seen or consulted any other health care providers outside our system since your last visit?”    NO            
individuals in attendance with the patient were advised that Artificial Intelligence will be utilized during this visit to record, process the conversation to generate a clinical note, and support improvement of the AI technology. The patient (or guardian, if applicable) and other individuals in attendance at the appointment consented to the use of AI, including the recording.                   An electronic signature was used to authenticate this note.    --Analisa Gutierrez MD Medicare Annual Wellness Visit    Melissa Collins is here for Medicare AWV (Patient states she has been having gastro issues; constipation for 2 days, pain for the 3rd day with diarrhea )    Assessment & Plan   Medicare annual wellness visit, subsequent  Essential (primary) hypertension  Acquired hypothyroidism  Recurrent UTI  Lymphedema, not elsewhere classified  PAF (paroxysmal atrial fibrillation) (HCC)  Diarrhea, unspecified type  Stage 3b chronic kidney disease (HCC)       No follow-ups on file.     Subjective       Patient's complete Health Risk Assessment and screening values have been reviewed and are found in Flowsheets. The following problems were reviewed today and where indicated follow up appointments were made and/or referrals ordered.    Positive Risk Factor Screenings with Interventions:              Inactivity:    (!) Abnormal       Interventions:  Patient declined any further interventions or treatment       Hearing Screen:       Interventions:  Referred to Audiology    Vision Screen:          Interventions:   Patient declines any further evaluation or treatment    Safety:       Interventions:  Patient declined any further interventions or treatment    ADL's:   Patient reports needing help with:       Interventions:  See above                  Objective   Vitals:    03/03/25 1424   BP: 124/77   Site: Left Upper Arm   Position: Sitting   Cuff Size: Medium Adult   Pulse: 78   Resp: 16   Temp: 98 °F (36.7 °C)   TempSrc: Oral

## 2025-03-03 NOTE — PATIENT INSTRUCTIONS
Chest pain or pressure, or a strange feeling in the chest.     Sweating.     Shortness of breath.     Pain, pressure, or a strange feeling in the back, neck, jaw, or upper belly or in one or both shoulders or arms.     Lightheadedness or sudden weakness.     A fast or irregular heartbeat.   After you call 911, the  may tell you to chew 1 adult-strength or 2 to 4 low-dose aspirin. Wait for an ambulance. Do not try to drive yourself.  Watch closely for changes in your health, and be sure to contact your doctor if you have any problems.  Where can you learn more?  Go to https://www.Mustbin.net/patientEd and enter F075 to learn more about \"A Healthy Heart: Care Instructions.\"  Current as of: July 31, 2024  Content Version: 14.3  © 2024 [employer].   Care instructions adapted under license by [employer]. If you have questions about a medical condition or this instruction, always ask your healthcare professional. [employer], disclaims any warranty or liability for your use of this information.    Personalized Preventive Plan for Melissa Collins - 3/3/2025  Medicare offers a range of preventive health benefits. Some of the tests and screenings are paid in full while other may be subject to a deductible, co-insurance, and/or copay.  Some of these benefits include a comprehensive review of your medical history including lifestyle, illnesses that may run in your family, and various assessments and screenings as appropriate.  After reviewing your medical record and screening and assessments performed today your provider may have ordered immunizations, labs, imaging, and/or referrals for you.  A list of these orders (if applicable) as well as your Preventive Care list are included within your After Visit Summary for your review.

## 2025-03-04 LAB
ALBUMIN SERPL-MCNC: 3.2 G/DL (ref 3.5–5)
ALBUMIN/GLOB SERPL: 1 (ref 1.1–2.2)
ALP SERPL-CCNC: 93 U/L (ref 45–117)
ALT SERPL-CCNC: 11 U/L (ref 12–78)
ANION GAP SERPL CALC-SCNC: 7 MMOL/L (ref 2–12)
AST SERPL-CCNC: 15 U/L (ref 15–37)
BILIRUB SERPL-MCNC: 0.6 MG/DL (ref 0.2–1)
BUN SERPL-MCNC: 28 MG/DL (ref 6–20)
BUN/CREAT SERPL: 19 (ref 12–20)
CALCIUM SERPL-MCNC: 9.3 MG/DL (ref 8.5–10.1)
CHLORIDE SERPL-SCNC: 109 MMOL/L (ref 97–108)
CHOLEST SERPL-MCNC: 125 MG/DL
CO2 SERPL-SCNC: 24 MMOL/L (ref 21–32)
CREAT SERPL-MCNC: 1.48 MG/DL (ref 0.55–1.02)
GLOBULIN SER CALC-MCNC: 3.2 G/DL (ref 2–4)
GLUCOSE SERPL-MCNC: 93 MG/DL (ref 65–100)
HDLC SERPL-MCNC: 54 MG/DL
HDLC SERPL: 2.3 (ref 0–5)
LDLC SERPL CALC-MCNC: 56.8 MG/DL (ref 0–100)
POTASSIUM SERPL-SCNC: 4.2 MMOL/L (ref 3.5–5.1)
PROT SERPL-MCNC: 6.4 G/DL (ref 6.4–8.2)
SODIUM SERPL-SCNC: 140 MMOL/L (ref 136–145)
T4 FREE SERPL-MCNC: 1.6 NG/DL (ref 0.8–1.5)
TRIGL SERPL-MCNC: 71 MG/DL
TSH SERPL DL<=0.05 MIU/L-ACNC: 4 UIU/ML (ref 0.36–3.74)
VLDLC SERPL CALC-MCNC: 14.2 MG/DL

## 2025-03-10 ENCOUNTER — HOSPITAL ENCOUNTER (OUTPATIENT)
Facility: HOSPITAL | Age: 89
Setting detail: RECURRING SERIES
End: 2025-03-10
Payer: MEDICARE

## 2025-03-13 ENCOUNTER — HOSPITAL ENCOUNTER (OUTPATIENT)
Facility: HOSPITAL | Age: 89
Setting detail: RECURRING SERIES
Discharge: HOME | End: 2025-03-16
Payer: MEDICARE

## 2025-03-13 PROCEDURE — 97140 MANUAL THERAPY 1/> REGIONS: CPT

## 2025-03-13 NOTE — PROGRESS NOTES
distinct time from the therapeutic activities interventions . (see flow sheet as applicable)    Details if applicable:    Pt presents with increased limb volumes bilaterally since Sept 2024. Left LE volumes increased more since last measured 1 month ago. Pt with 4+ pitting edema in lower legs/feet, 3+ pitting surrounding knees. Skin intact this visit, dry. Pt presents with band-aids on bottom of both feet where podiatrist shaved down callous. Reviewed skin care routine for home. Also encouraged walking program and seated remedial ex's to help promote lymphatic fluid movement.     Volumes:  Date Right (mL) Left (mL) %   Difference   2/24/25 8,738.78  7,689.0 13.65   1/28/25 8,774.55 7,282.84 20.48   9/10/24 7,325.81 6,124.27 19.62   4/24/24 9,160.28 7,767.59 15.2   4/5/24 9,415.59 7,911.96 15.97   3/25/24 10,360.61 8,755.98 15.49   3/21/24 9,712.44 8,395.31 13.56   3/4/24 10,537.83 9,151.00 13.16   1/18/2024 10,522.50 9535.39 9.38   10/26/23 10,669.42 10,544.63 1.18    Manual Lymphatic Drainage (MLD):  Area to decongest: LE Bilateral, foot, ankle, calf, and mid knee   Sequence used and effectiveness: Modifications were made to manual lymph drainage sequence to exclude cervical techniques secondary to patient's age. and Secondary sequence for lower extremities without trunk involvement.  Began and ended sequence with diaphragmatic breaths x5, stimulated inguinal nodes, extra time spent on medial thigh and distal lower leg    Skin/wound care/debridement: Reviewed skin care principles:   Performed skin care with low pH lotion following manual lymph principles.  Remedy and Aquaphor (to distal lower leg and feet)   Applied multi-layer compression bandaging to: Patient arrived without adequate bandage in place that was applied by caregiver.    The following multi-layer bandages were applied bilaterally below the knee:   Protouch Stockinette    Padding layer:  Fleece    Foam:  10 cm roll    Short stretch bandages:   6 cm, 8

## 2025-03-17 ENCOUNTER — HOSPITAL ENCOUNTER (OUTPATIENT)
Facility: HOSPITAL | Age: 89
Setting detail: RECURRING SERIES
Discharge: HOME | End: 2025-03-20
Payer: MEDICARE

## 2025-03-17 PROCEDURE — 97140 MANUAL THERAPY 1/> REGIONS: CPT

## 2025-03-17 NOTE — PROGRESS NOTES
PHYSICAL THERAPY/OCCUPATIONAL THERAPY - MEDICARE DAILY TREATMENT NOTE (updated 3/23)      Date: 3/17/2025          Patient Name:  Melissa Collins :  1934   Medical   Diagnosis:  Lymphedema, not elsewhere classified [I89.0] Treatment Diagnosis:  I89.0     Lymphedema, not elsewhere classified, R26.89   Other abnormalities of gait and mobility, and R60.9     Edema, unspecified    Referral Source:  Analisa Gutierrez MD Insurance:   Payor: MEDICARE / Plan: MEDICARE PART A AND B / Product Type: *No Product type* /                     Patient  verified yes     Visit #   Current  / Total 4 12 per POC   Time   In / Out 1304 1354   Total Treatment Time 50   Total Timed Codes 50   1:1 Treatment Time 50      [facility] Totals Reminder:  bill using total billable   min of TIMED therapeutic procedures and modalities.   8-22 min = 1 unit; 23-37 min = 2 units; 38-52 min = 3 units;  53-67 min = 4 units; 68-82 min = 5 units         SUBJECTIVE    Pain Level (0-10 scale): Denies pain    Any medication changes, allergies to medications, adverse drug reactions, diagnosis change, or new procedure performed?: [x] No    [] Yes (see summary sheet for update)  Medications: Verified on Patient Summary List    Subjective functional status/changes:     Pt arrived to appt with knee compression socks on, removed bandaging this morning. Notes decongestion in foot ankle, greater ease wearing slippers with velcro attachment. Patients  and daughter are present for appt    OBJECTIVE      Therapeutic Procedures:  Tx Min Billable or 1:1 Min (if diff from Tx Min) Procedure, Rationale, Specifics   50  [id number] Manual Therapy (timed):  decrease pain, increase ROM, increase tissue extensibility, and decrease edema to improve patient's ability to progress to PLOF and address remaining functional goals.  The manual therapy interventions were performed at a separate and distinct time from the therapeutic activities interventions . (see flow sheet as

## 2025-03-20 ENCOUNTER — HOSPITAL ENCOUNTER (OUTPATIENT)
Facility: HOSPITAL | Age: 89
Setting detail: RECURRING SERIES
Discharge: HOME | End: 2025-03-23
Payer: MEDICARE

## 2025-03-20 PROCEDURE — 97140 MANUAL THERAPY 1/> REGIONS: CPT

## 2025-03-20 NOTE — PROGRESS NOTES
PHYSICAL THERAPY/OCCUPATIONAL THERAPY - MEDICARE DAILY TREATMENT NOTE (updated 3/23)      Date: 3/20/2025          Patient Name:  Melissa Collins :  1934   Medical   Diagnosis:  Lymphedema, not elsewhere classified [I89.0] Treatment Diagnosis:  I89.0     Lymphedema, not elsewhere classified, R26.89   Other abnormalities of gait and mobility, and R60.9     Edema, unspecified    Referral Source:  Analisa Gutierrez MD Insurance:   Payor: MEDICARE / Plan: MEDICARE PART A AND B / Product Type: *No Product type* /                     Patient  verified yes     Visit #   Current  / Total 5 12 per POC   Time   In / Out 1220 1310   Total Treatment Time 50   Total Timed Codes 50   1:1 Treatment Time 50      [facility] Totals Reminder:  bill using total billable   min of TIMED therapeutic procedures and modalities.   8-22 min = 1 unit; 23-37 min = 2 units; 38-52 min = 3 units;  53-67 min = 4 units; 68-82 min = 5 units         SUBJECTIVE    Pain Level (0-10 scale): Denies pain    Any medication changes, allergies to medications, adverse drug reactions, diagnosis change, or new procedure performed?: [x] No    [] Yes (see summary sheet for update)  Medications: Verified on Patient Summary List    Subjective functional status/changes:     Pt arrived to appt with bandages removed. Pt says she took them off in the middle of the night so she could wash the hospital gripper socks. Pt reports not feeling well today, says she did not sleep well last night. Pt reports her legs looked \"much smaller\" when she removed the bandages last night, but was surprised they re-swelled so quickly. Pt agreeable to treatment.     OBJECTIVE      Therapeutic Procedures:  Tx Min Billable or 1:1 Min (if diff from Tx Min) Procedure, Rationale, Specifics   50  [id number] Manual Therapy (timed):  decrease pain, increase ROM, increase tissue extensibility, and decrease edema to improve patient's ability to progress to PLOF and address remaining functional

## 2025-03-24 ENCOUNTER — HOSPITAL ENCOUNTER (OUTPATIENT)
Facility: HOSPITAL | Age: 89
Setting detail: RECURRING SERIES
Discharge: HOME | End: 2025-03-27
Payer: MEDICARE

## 2025-03-24 PROCEDURE — 97140 MANUAL THERAPY 1/> REGIONS: CPT

## 2025-03-24 NOTE — PROGRESS NOTES
PHYSICAL THERAPY/OCCUPATIONAL THERAPY - MEDICARE DAILY TREATMENT NOTE (updated 3/23)      Date: 3/24/2025          Patient Name:  Melissa Collins :  1934   Medical   Diagnosis:  Lymphedema, not elsewhere classified [I89.0] Treatment Diagnosis:  I89.0     Lymphedema, not elsewhere classified, R26.89   Other abnormalities of gait and mobility, and R60.9     Edema, unspecified    Referral Source:  Analisa Gutierrez MD Insurance:   Payor: MEDICARE / Plan: MEDICARE PART A AND B / Product Type: *No Product type* /                     Patient  verified yes     Visit #   Current  / Total 6 12 per POC   Time   In / Out 1216 1310   Total Treatment Time 54   Total Timed Codes 54   1:1 Treatment Time 54      [facility] Totals Reminder:  bill using total billable   min of TIMED therapeutic procedures and modalities.   8-22 min = 1 unit; 23-37 min = 2 units; 38-52 min = 3 units;  53-67 min = 4 units; 68-82 min = 5 units         SUBJECTIVE    Pain Level (0-10 scale): Denies pain    Any medication changes, allergies to medications, adverse drug reactions, diagnosis change, or new procedure performed?: [x] No    [] Yes (see summary sheet for update)  Medications: Verified on Patient Summary List    Subjective functional status/changes:     Pt arrived to appt with bandages removed. Pt says she took them off this morning to shower. Pt says she is going to stay wrapped until her appt on Thursday and hopes to measure for stockings that day. Pt agreeable to treatment.     OBJECTIVE      Therapeutic Procedures:  Tx Min Billable or 1:1 Min (if diff from Tx Min) Procedure, Rationale, Specifics   54  46260 Manual Therapy (timed):  decrease pain, increase ROM, increase tissue extensibility, and decrease edema to improve patient's ability to progress to PLOF and address remaining functional goals.  The manual therapy interventions were performed at a separate and distinct time from the therapeutic activities interventions . (see flow

## 2025-03-24 NOTE — PROGRESS NOTES
[facility]   a part of [facility][note 3]  [address], [address]   [place] [address]   Phone: [phone]  Fax: [phone]      PHYSICAL THERAPY PROGRESS NOTE  Patient Name:  Melissa Collins :  1934   Treatment/Medical Diagnosis: Lymphedema, not elsewhere classified [I89.0]   Referral Source:  Analisa Gutierrez MD     Date of Initial Visit:  25 Attended Visits:  6 Missed Visits:  0     SUMMARY OF TREATMENT/ASSESSMENT:  Patient progressing well with twice weekly bandaging and MLD. Patient returned to clinic today with decongestion noted in feet and ankles, skin condition improved with less flaky skin observed today. Recommend pt remain bandaged between appts, only removing 1 hour prior to appt to shower at home. Will continue MLD/MLB 2x/week and plan to measure for replacement custom garments once limb volumes reduced. Pt hopes to measure for garments next visit. Discussed alternative nighttime compression garments including Tribute garments that would be a simpler option for nighttime compression and would help with upper leg/knee swelling. Also discussed custom marlys that could be an option to help with upper leg/truncal swelling. Pt in agreement with plan.     CURRENT STATUS/GOALS    Patient will demonstrate decreased volumetric measurements by 400 ml bilateral lower extremities, in order to reduce risk for infection, decrease feeling of limb heaviness, and increase independence/tolerance for ADL completion within 6 weeks.  Status at last Eval/Progress Note: not met   Current Status: ongoing   Goal Met?  no    2.  Patient/Caregiver to verbalize 3/3 signs and symptoms of infection without external cueing, in order to promote optimal self-management of condition in 6 weeks.   Status at last Eval/Progress Note: not met   Current Status: ongoing   Goal Met?  no    3. Patient/caregiver will demonstrate improved edema management as evidenced by performing

## 2025-03-25 RX ORDER — DILTIAZEM HYDROCHLORIDE 240 MG/1
240 CAPSULE, EXTENDED RELEASE ORAL DAILY
Qty: 90 CAPSULE | Refills: 0 | Status: SHIPPED | OUTPATIENT
Start: 2025-03-25

## 2025-03-27 ENCOUNTER — APPOINTMENT (OUTPATIENT)
Facility: HOSPITAL | Age: 89
End: 2025-03-27
Payer: MEDICARE

## 2025-03-31 ENCOUNTER — HOSPITAL ENCOUNTER (OUTPATIENT)
Facility: HOSPITAL | Age: 89
Setting detail: RECURRING SERIES
Discharge: HOME | End: 2025-04-03
Payer: MEDICARE

## 2025-03-31 PROCEDURE — 97760 ORTHOTIC MGMT&TRAING 1ST ENC: CPT

## 2025-03-31 PROCEDURE — 97140 MANUAL THERAPY 1/> REGIONS: CPT

## 2025-03-31 NOTE — PROGRESS NOTES
PHYSICAL THERAPY/OCCUPATIONAL THERAPY - MEDICARE DAILY TREATMENT NOTE (updated 3/23)      Date: 3/31/2025          Patient Name:  Melissa Collins :  1934   Medical   Diagnosis:  Lymphedema, not elsewhere classified [I89.0] Treatment Diagnosis:  I89.0     Lymphedema, not elsewhere classified, R26.89   Other abnormalities of gait and mobility, and R60.9     Edema, unspecified    Referral Source:  Analisa Gutierrez MD Insurance:   Payor: MEDICARE / Plan: MEDICARE PART A AND B / Product Type: *No Product type* /                     Patient  verified yes     Visit #   Current  / Total 7 12 per POC   Time   In / Out 1218 1315   Total Treatment Time 57   Total Timed Codes 45   1:1 Treatment Time 45       [facility] Totals Reminder:  bill using total billable   min of TIMED therapeutic procedures and modalities.   8-22 min = 1 unit; 23-37 min = 2 units; 38-52 min = 3 units;  53-67 min = 4 units; 68-82 min = 5 units         SUBJECTIVE    Pain Level (0-10 scale): Denies pain    Any medication changes, allergies to medications, adverse drug reactions, diagnosis change, or new procedure performed?: [x] No    [] Yes (see summary sheet for update)  Medications: Verified on Patient Summary List    Subjective functional status/changes:     Pt arrived to appt with bandages intact bilateral LE. Pt says she has remained wrapped x 1 week due to cancelled visit last week. Pt hopes to measure for garments today. Pt agreeable to treatment.     OBJECTIVE      Therapeutic Procedures:  Tx Min Billable or 1:1 Min (if diff from Tx Min) Procedure, Rationale, Specifics   30 30 10885 Manual Therapy (timed):  decrease pain, increase ROM, increase tissue extensibility, and decrease edema to improve patient's ability to progress to PLOF and address remaining functional goals.  The manual therapy interventions were performed at a separate and distinct time from the therapeutic activities interventions . (see flow sheet as

## 2025-04-03 ENCOUNTER — HOSPITAL ENCOUNTER (OUTPATIENT)
Facility: HOSPITAL | Age: 89
Setting detail: RECURRING SERIES
Discharge: HOME | End: 2025-04-06
Payer: MEDICARE

## 2025-04-03 PROCEDURE — 97140 MANUAL THERAPY 1/> REGIONS: CPT

## 2025-04-03 NOTE — PROGRESS NOTES
compliance with daily compression garment wear and use of pump 3-5x/week for continued volume reduction and prevention of re-accumulation of fluid during maintenance phase of CDT within 12 weeks.       PLAN  Yes  Continue plan of care  Re-Cert Due: 4/28/25  PN due: 4/24/25  []  Upgrade activities as tolerated  []  Discharge due to:  [x]  Other: continue MLD/MLB, fit garments upon receiving       Jennifer Arriola, PT, DPT, CLT-LINO      4/3/2025       1:50 PM

## 2025-04-07 ENCOUNTER — HOSPITAL ENCOUNTER (OUTPATIENT)
Facility: HOSPITAL | Age: 89
Setting detail: RECURRING SERIES
Discharge: HOME | End: 2025-04-10
Payer: MEDICARE

## 2025-04-07 PROCEDURE — 97140 MANUAL THERAPY 1/> REGIONS: CPT

## 2025-04-07 NOTE — PROGRESS NOTES
reducing risk for infection and promote transition to maintenance phase of CDT in 12 weeks.  2.   Patient will demonstrate decrease in self-perceived functional impairment as evidenced by improved score on Lymphedema Life Impact Scale outcome measure from 43% impairment to 33% impairment within 12 weeks.  3.   Patient and/or caregiver will demonstrate independence and compliance with daily compression garment wear and use of pump 3-5x/week for continued volume reduction and prevention of re-accumulation of fluid during maintenance phase of CDT within 12 weeks.       PLAN  Yes  Continue plan of care  Re-Cert Due: 4/28/25  PN due: 4/24/25  []  Upgrade activities as tolerated  []  Discharge due to:  [x]  Other: continue MLD/MLB, fit garments upon receiving       Jennifer Arriola, PT, DPT, CLT-LINO      4/7/2025       1:41 PM

## 2025-04-10 ENCOUNTER — HOSPITAL ENCOUNTER (OUTPATIENT)
Facility: HOSPITAL | Age: 89
Setting detail: RECURRING SERIES
Discharge: HOME | End: 2025-04-13
Payer: MEDICARE

## 2025-04-10 PROCEDURE — 97140 MANUAL THERAPY 1/> REGIONS: CPT

## 2025-04-10 NOTE — PROGRESS NOTES
No  Compression bandaging/garment precautions reviewed: [x] Yes  [] No      Other Objective/Functional Measures      Height:    Weight:     BMI:    (36 or greater: adversely affecting lymphedema)      Pain Level at end of session (0-10 scale): Denies pain      Assessment   Limb volumes reduced previous visit and pt measured for replacement custom knee high stockings. Discussed alternative nighttime compression garments including Tribute garments that would be a simpler option for nighttime compression and would help with upper leg/knee swelling. Also discussed custom marlys that could be an option to help with upper leg/truncal swelling. Pt requested to measure for custom knee high stockings only at this time. Will continue MLB in clinic until pt receives new garments for fitting. Pt in agreement with plan, eager to transition to garments.   Patient will continue to benefit from skilled PT / OT services to address functional mobility deficits, address ROM deficits, address swelling, analyze and address soft tissue restrictions, analyze compression product fit and use, instruct in home and community integration, instruct in home lymphedema management program, measure for compression products, and modify and progress therapeutic interventions to address functional deficits and attain remaining goals.    Progress toward goals / Updated goals:  []  See Progress Note/Recertification    Short Term Goals: To be accomplished in 6 treatments.  Patient will demonstrate decreased volumetric measurements by 400 ml bilateral lower extremities, in order to reduce risk for infection, decrease feeling of limb heaviness, and increase independence/tolerance for ADL completion within 6 weeks. Not met, ongoing   2.   Patient/Caregiver to verbalize 3/3 signs and symptoms of infection without external cueing, in order to promote optimal self-management of condition in 6 weeks. Met 3/31/25      Long Term Goals: To be accomplished in 12

## 2025-04-16 ENCOUNTER — HOSPITAL ENCOUNTER (OUTPATIENT)
Facility: HOSPITAL | Age: 89
Setting detail: RECURRING SERIES
Discharge: HOME | End: 2025-04-19
Payer: MEDICARE

## 2025-04-16 PROCEDURE — 97140 MANUAL THERAPY 1/> REGIONS: CPT

## 2025-04-16 NOTE — PROGRESS NOTES
Precautions., and Handout provided.  Patient / caregiver re-demonstrated bandaging. [] Yes  [x] No  Compression bandaging/garment precautions reviewed: [x] Yes  [] No      Other Objective/Functional Measures      Height:    Weight:     BMI:    (36 or greater: adversely affecting lymphedema)      Pain Level at end of session (0-10 scale): Denies pain      Assessment   Limb volumes reduced previous visit and pt measured for replacement custom knee high stockings. Discussed alternative nighttime compression garments including Tribute garments that would be a simpler option for nighttime compression and would help with upper leg/knee swelling. Also discussed custom marlys that could be an option to help with upper leg/truncal swelling. Pt requested to measure for custom knee high stockings only at this time. Will continue MLB in clinic until pt receives new garments for fitting. Discussed purchasing leg  today since pt reports increased difficulty getting in/out of bed. Also discussed referral to home health PT once discharged from lymphedema clinic due to ongoing concerns of declining mobility. Pt says she is interested in home PT. Pt in agreement with plan, eager to transition to garments.   Patient will continue to benefit from skilled PT / OT services to address functional mobility deficits, address ROM deficits, address swelling, analyze and address soft tissue restrictions, analyze compression product fit and use, instruct in home and community integration, instruct in home lymphedema management program, measure for compression products, and modify and progress therapeutic interventions to address functional deficits and attain remaining goals.    Progress toward goals / Updated goals:  []  See Progress Note/Recertification    Short Term Goals: To be accomplished in 6 treatments.  Patient will demonstrate decreased volumetric measurements by 400 ml bilateral lower extremities, in order to reduce risk for

## 2025-04-21 ENCOUNTER — APPOINTMENT (OUTPATIENT)
Facility: HOSPITAL | Age: 89
End: 2025-04-21
Payer: MEDICARE

## 2025-04-24 ENCOUNTER — HOSPITAL ENCOUNTER (OUTPATIENT)
Facility: HOSPITAL | Age: 89
Setting detail: RECURRING SERIES
Discharge: HOME | End: 2025-04-27
Payer: MEDICARE

## 2025-04-24 PROCEDURE — 97140 MANUAL THERAPY 1/> REGIONS: CPT

## 2025-04-24 NOTE — PROGRESS NOTES
PHYSICAL THERAPY/OCCUPATIONAL THERAPY - MEDICARE DAILY TREATMENT NOTE (updated 3/23)      Date: 2025          Patient Name:  Melissa Collins :  1934   Medical   Diagnosis:  Lymphedema, not elsewhere classified [I89.0] Treatment Diagnosis:  I89.0     Lymphedema, not elsewhere classified, R26.89   Other abnormalities of gait and mobility, and R60.9     Edema, unspecified    Referral Source:  Analisa Gutierrez MD Insurance:   Payor: MEDICARE / Plan: MEDICARE PART A AND B / Product Type: *No Product type* /                     Patient  verified yes     Visit #   Current  / Total 12 12 per POC   Time   In / Out 1045 1140   Total Treatment Time 55   Total Timed Codes 55   1:1 Treatment Time 55      [facility] Totals Reminder:  bill using total billable   min of TIMED therapeutic procedures and modalities.   8-22 min = 1 unit; 23-37 min = 2 units; 38-52 min = 3 units;  53-67 min = 4 units; 68-82 min = 5 units         SUBJECTIVE    Pain Level (0-10 scale): R groin 5/10     Any medication changes, allergies to medications, adverse drug reactions, diagnosis change, or new procedure performed?: [x] No    [] Yes (see summary sheet for update)  Medications: Verified on Patient Summary List    Subjective functional status/changes:     Pt arrived to appt with bandages removed, says she took them off this morning to shower. Pt's daughter says she spoke with  and confirmed shipping address for garments, hopes pt will have them in the next week or two. Encouraged pt to follow up with MD regarding chronic non-healing wound on right posterior thigh. Pt agreeable to treatment, eager to transition to compression garments.     OBJECTIVE      Therapeutic Procedures:  Tx Min Billable or 1:1 Min (if diff from Tx Min) Procedure, Rationale, Specifics   55  59346 Manual Therapy (timed):  decrease pain, increase ROM, increase tissue extensibility, and decrease edema to improve patient's ability to progress to PLOF and address

## 2025-04-24 NOTE — THERAPY RECERTIFICATION
[facility]   a part of [facility]  [address], [address]   [place] [address]   Phone: [phone]  Fax: [phone]      CONTINUED PLAN OF CARE/RECERTIFICATION FOR PHYSICAL THERAPY          Patient Name:              Melissa Collins :  1934   Treatment/Medical Diagnosis:  Lymphedema, not elsewhere classified [I89.0]   Onset Date:  20+ year history of LE lymphedema, fluctuations in size.     Referral Source:  Analisa Gutierrez MD Start of Care (SOC):  25   Prior Hospitalization:  See Medical History Provider #:  [id number]      Prior Level of Function (PLOF):  Lymphedema was well managed with wear of custom flat knit compression stockings, daily use of vasopneumatic pump.    Comorbidities:  Arthritis, a-fib, frequent UTIs, ventral hernia repair, GERD, hysterectomy, HTN, melanoma, thyroid disease.    Medications:  Verified on Patient Summary List   Visits from SOC:  12 Missed Visits:  1     Progress toward Goals:  Patient will demonstrate decreased volumetric measurements by 400 ml bilateral lower extremities, in order to reduce risk for infection, decrease feeling of limb heaviness, and increase independence/tolerance for ADL completion within 6 weeks.   Status at last Eval/Progress Note: not met   Current Status: ongoing   Goal Met?  no    2.  Patient/Caregiver to verbalize 3/3 signs and symptoms of infection without external cueing, in order to promote optimal self-management of condition in 6 weeks.   Status at last Eval/Progress Note: not met   Current Status: Met 3/31/25  Goal Met?  yes    3. Patient/caregiver will demonstrate improved edema management as evidenced by performing donning/doffing of garments modified independent 3/3 times within session to aid in reducing risk for infection and promote transition to maintenance phase of CDT in 12 weeks.   Status at last Eval/Progress Note: not met   Current Status: waiting on arrival of garments

## 2025-04-28 ENCOUNTER — HOSPITAL ENCOUNTER (OUTPATIENT)
Facility: HOSPITAL | Age: 89
Setting detail: RECURRING SERIES
Discharge: HOME | End: 2025-05-01
Payer: MEDICARE

## 2025-04-28 PROCEDURE — 97140 MANUAL THERAPY 1/> REGIONS: CPT

## 2025-04-28 NOTE — PROGRESS NOTES
PHYSICAL THERAPY/OCCUPATIONAL THERAPY - MEDICARE DAILY TREATMENT NOTE (updated 3/23)      Date: 2025          Patient Name:  Melissa Collins :  1934   Medical   Diagnosis:  Lymphedema, not elsewhere classified [I89.0] Treatment Diagnosis:  I89.0     Lymphedema, not elsewhere classified, R26.89   Other abnormalities of gait and mobility, and R60.9     Edema, unspecified    Referral Source:  Analisa Gutierrez MD Insurance:   Payor: MEDICARE / Plan: MEDICARE PART A AND B / Product Type: *No Product type* /                     Patient  verified yes     Visit #   Current  / Total 13 24 per POC   Time   In / Out 1045 1125   Total Treatment Time 40   Total Timed Codes 40   1:1 Treatment Time 40      [facility] Totals Reminder:  bill using total billable   min of TIMED therapeutic procedures and modalities.   8-22 min = 1 unit; 23-37 min = 2 units; 38-52 min = 3 units;  53-67 min = 4 units; 68-82 min = 5 units         SUBJECTIVE    Pain Level (0-10 scale): R groin 5/10, R posterior thigh wound 8/10 at times     Any medication changes, allergies to medications, adverse drug reactions, diagnosis change, or new procedure performed?: [x] No    [] Yes (see summary sheet for update)  Medications: Verified on Patient Summary List    Subjective functional status/changes:     Pt arrived to appt with bandages removed, says she took them off this morning to shower. Pt's daughter says she spoke with  and confirmed shipping address for garments, hopes pt will have them in the next week or two. Encouraged pt to follow up with MD regarding chronic non-healing wound on right posterior thigh. Pt agreeable to treatment, eager to transition to compression garments. Pt requested MLB only, says she isn't feeling good today.      OBJECTIVE      Therapeutic Procedures:  Tx Min Billable or 1:1 Min (if diff from Tx Min) Procedure, Rationale, Specifics   40  22191 Manual Therapy (timed):  decrease pain, increase ROM, increase tissue

## 2025-05-01 ENCOUNTER — HOSPITAL ENCOUNTER (OUTPATIENT)
Facility: HOSPITAL | Age: 89
Setting detail: RECURRING SERIES
Discharge: HOME | End: 2025-05-04
Payer: MEDICARE

## 2025-05-01 PROCEDURE — 97760 ORTHOTIC MGMT&TRAING 1ST ENC: CPT

## 2025-05-01 PROCEDURE — 97140 MANUAL THERAPY 1/> REGIONS: CPT

## 2025-05-01 NOTE — PROGRESS NOTES
PHYSICAL THERAPY/OCCUPATIONAL THERAPY - MEDICARE DAILY TREATMENT NOTE (updated 3/23)      Date: 2025          Patient Name:  Melissa Collins :  1934   Medical   Diagnosis:  Lymphedema, not elsewhere classified [I89.0] Treatment Diagnosis:  I89.0     Lymphedema, not elsewhere classified, R26.89   Other abnormalities of gait and mobility, and R60.9     Edema, unspecified    Referral Source:  Analisa Gutierrez MD Insurance:   Payor: MEDICARE / Plan: MEDICARE PART A AND B / Product Type: *No Product type* /                     Patient  verified yes     Visit #   Current  / Total 14 24 per POC   Time   In / Out 1210 1250   Total Treatment Time 40   Total Timed Codes 30   1:1 Treatment Time 30      [facility] Totals Reminder:  bill using total billable   min of TIMED therapeutic procedures and modalities.   8-22 min = 1 unit; 23-37 min = 2 units; 38-52 min = 3 units;  53-67 min = 4 units; 68-82 min = 5 units         SUBJECTIVE    Pain Level (0-10 scale): R groin 5/10, R posterior thigh wound 8/10 at times     Any medication changes, allergies to medications, adverse drug reactions, diagnosis change, or new procedure performed?: [x] No    [] Yes (see summary sheet for update)  Medications: Verified on Patient Summary List    Subjective functional status/changes:     Pt arrived to appt with bandages removed, says she took them off this morning to shower. Pt received new custom stockings and brought for fitting today. Encouraged pt to follow up with MD regarding chronic non-healing wound on right posterior thigh. Pt agreeable to treatment, eager to transition to compression garments. Pt says she tried wearing RM marlys yesterday, but only able to tolerate it for 3-4 hours. Pt says marlys was too hard to don over bandages, but willing to try again over her stockings.      OBJECTIVE      Therapeutic Procedures:  Tx Min Billable or 1:1 Min (if diff from Tx Min) Procedure, Rationale, Specifics   [id number] [phone] [id number] Manual

## 2025-05-05 ENCOUNTER — OFFICE VISIT (OUTPATIENT)
Age: 89
End: 2025-05-05
Payer: MEDICARE

## 2025-05-05 VITALS
HEART RATE: 64 BPM | OXYGEN SATURATION: 98 % | HEIGHT: 62 IN | SYSTOLIC BLOOD PRESSURE: 118 MMHG | RESPIRATION RATE: 16 BRPM | DIASTOLIC BLOOD PRESSURE: 64 MMHG | WEIGHT: 150.4 LBS | BODY MASS INDEX: 27.68 KG/M2

## 2025-05-05 DIAGNOSIS — I07.1 TRICUSPID VALVE INSUFFICIENCY, UNSPECIFIED ETIOLOGY: ICD-10-CM

## 2025-05-05 DIAGNOSIS — I89.0 LYMPHEDEMA: ICD-10-CM

## 2025-05-05 DIAGNOSIS — I10 HTN (HYPERTENSION), BENIGN: ICD-10-CM

## 2025-05-05 DIAGNOSIS — I48.19 PERSISTENT ATRIAL FIBRILLATION (HCC): Primary | ICD-10-CM

## 2025-05-05 DIAGNOSIS — K43.6 INCARCERATED VENTRAL HERNIA: ICD-10-CM

## 2025-05-05 DIAGNOSIS — I35.1 AORTIC VALVE INSUFFICIENCY, ETIOLOGY OF CARDIAC VALVE DISEASE UNSPECIFIED: ICD-10-CM

## 2025-05-05 PROCEDURE — 1123F ACP DISCUSS/DSCN MKR DOCD: CPT

## 2025-05-05 PROCEDURE — 1159F MED LIST DOCD IN RCRD: CPT

## 2025-05-05 PROCEDURE — 93005 ELECTROCARDIOGRAM TRACING: CPT

## 2025-05-05 PROCEDURE — 1126F AMNT PAIN NOTED NONE PRSNT: CPT

## 2025-05-05 PROCEDURE — 1036F TOBACCO NON-USER: CPT

## 2025-05-05 PROCEDURE — 99214 OFFICE O/P EST MOD 30 MIN: CPT

## 2025-05-05 PROCEDURE — 93010 ELECTROCARDIOGRAM REPORT: CPT

## 2025-05-05 PROCEDURE — G8419 CALC BMI OUT NRM PARAM NOF/U: HCPCS

## 2025-05-05 PROCEDURE — G8427 DOCREV CUR MEDS BY ELIG CLIN: HCPCS

## 2025-05-05 PROCEDURE — 1160F RVW MEDS BY RX/DR IN RCRD: CPT

## 2025-05-05 PROCEDURE — 1090F PRES/ABSN URINE INCON ASSESS: CPT

## 2025-05-05 RX ORDER — DILTIAZEM HYDROCHLORIDE 240 MG/1
240 CAPSULE, COATED, EXTENDED RELEASE ORAL DAILY
Qty: 90 CAPSULE | Refills: 3 | Status: SHIPPED | OUTPATIENT
Start: 2025-05-05

## 2025-05-05 NOTE — PROGRESS NOTES
Primary Cardiologist:  Deyanira Al MD. [facility]          Patient: Melissa Collins  : 1934      Today's Date: 2025        HISTORY OF PRESENT ILLNESS:     History of Present Illness:    Doing OK overall.   Doing fine - no cardiac complaints - No CP or sig SOB  Has some stomach ache 3x a week.      PAST MEDICAL HISTORY:     Past Medical History:   Diagnosis Date    Anesthesia complication     confusion, slow to arouse    Arthritis     Atrial fibrillation (HCC)     discovered 5/15/23    Cataract     bilateral eyes    Frequent urinary tract infections     GERD (gastroesophageal reflux disease)     esophageal strictures    H/O ventral hernia repair     H/O: hysterectomy     Hypertension     Lymphedema     Melanoma (HCC)     leg    Thyroid disease        Past Surgical History:   Procedure Laterality Date    CATARACT REMOVAL Bilateral     CHOLECYSTECTOMY      COLONOSCOPY      CYSTOSCOPY      CYSTOSCOPY N/A 2023    CYSTOSCOPY BLADDER BIOPSY performed by Manan Martinez MD at [facility] MAIN OR    ELBOW SURGERY Left     ORIF left elbow    ENDOSCOPY, COLON, DIAGNOSTIC      HERNIA REPAIR      x2    HYSTERECTOMY (CERVIX STATUS UNKNOWN)      MOHS SURGERY      OTHER SURGICAL HISTORY      multiple excision of skin cancer    TONSILLECTOMY      TOTAL KNEE ARTHROPLASTY Bilateral     VENTRAL HERNIA REPAIR N/A 2023    RECURRENT  INCARCERATED VENTRAL HERNIA  REPAIR; EXTENSIVE LYSIS OF ADHESIONS;SMALL BOWEL RESECTION performed by Dione Tripathi MD at [facility] MAIN OR             CURRENT MEDICATIONS:    .  Current Outpatient Medications   Medication Sig Dispense Refill    apixaban (ELIQUIS) 2.5 MG TABS tablet Take 1 tablet by mouth 2 times daily 180 tablet 3    dilTIAZem (CARDIZEM CD) 240 MG extended release capsule Take 1 capsule by mouth daily 90 capsule 3    levothyroxine (SYNTHROID) 75 MCG tablet TAKE 1 TABLET BY MOUTH ONCE DAILY BEFORE BREAKFAST 90 tablet 2    labetalol (NORMODYNE) 200 MG tablet Take 1 tablet by

## 2025-05-05 NOTE — PROGRESS NOTES
had concerns including Atrial Fibrillation and Hypertension.    Vitals:    05/05/25 1417   BP: 118/64   BP Site: Left Upper Arm   Patient Position: Sitting   BP Cuff Size: Medium Adult   Pulse: 64   Resp: 16   SpO2: 98%   Weight: 68.2 kg (150 lb 6.4 oz)   Height: 1.575 m (5' 2\")        Chest pain No    Refills No        1. Have you been to the ER, urgent care clinic since your last visit? No       Hospitalized since your last visit? No       Where?        Date?

## 2025-05-07 ENCOUNTER — HOSPITAL ENCOUNTER (OUTPATIENT)
Facility: HOSPITAL | Age: 89
Setting detail: RECURRING SERIES
Discharge: HOME | End: 2025-05-10
Payer: MEDICARE

## 2025-05-07 PROCEDURE — 97140 MANUAL THERAPY 1/> REGIONS: CPT

## 2025-05-07 PROCEDURE — 97763 ORTHC/PROSTC MGMT SBSQ ENC: CPT

## 2025-05-07 NOTE — PROGRESS NOTES
PHYSICAL THERAPY/OCCUPATIONAL THERAPY - MEDICARE DAILY TREATMENT NOTE (updated 3/23)      Date: 2025          Patient Name:  Melissa Collins :  1934   Medical   Diagnosis:  Lymphedema, not elsewhere classified [I89.0] Treatment Diagnosis:  I89.0     Lymphedema, not elsewhere classified, R26.89   Other abnormalities of gait and mobility, and R60.9     Edema, unspecified    Referral Source:  Analisa Gutierrez MD Insurance:   Payor: MEDICARE / Plan: MEDICARE PART A AND B / Product Type: *No Product type* /                     Patient  verified yes     Visit #   Current  / Total 15 24 per POC   Time   In / Out 1220 1320   Total Treatment Time 60   Total Timed Codes 60   1:1 Treatment Time 60      [facility] Totals Reminder:  bill using total billable   min of TIMED therapeutic procedures and modalities.   8-22 min = 1 unit; 23-37 min = 2 units; 38-52 min = 3 units;  53-67 min = 4 units; 68-82 min = 5 units         SUBJECTIVE    Pain Level (0-10 scale): R groin 5/10, R posterior thigh wound 8/10 at times     Any medication changes, allergies to medications, adverse drug reactions, diagnosis change, or new procedure performed?: [x] No    [] Yes (see summary sheet for update)  Medications: Verified on Patient Summary List    Subjective functional status/changes:     Pt arrived to appt wearing custom stockings. Pt says she has worn stockings almost every day, but has only used the pump a couple of times and has not worn her marlys. Pt says she has not washed her stockings yet. Pt admits to decreased compliance with home program, says she has \"felt bad.\" Encouraged pt to follow up with MD regarding chronic non-healing wound on right posterior thigh. Pt agreeable to treatment.    OBJECTIVE      Therapeutic Procedures:  Tx Min Billable or 1:1 Min (if diff from Tx Min) Procedure, Rationale, Specifics   45  70919 Manual Therapy (timed):  decrease pain, increase ROM, increase tissue extensibility, and decrease edema to

## 2025-05-13 ENCOUNTER — APPOINTMENT (OUTPATIENT)
Facility: HOSPITAL | Age: 89
End: 2025-05-13
Payer: MEDICARE

## 2025-05-16 ENCOUNTER — APPOINTMENT (OUTPATIENT)
Facility: HOSPITAL | Age: 89
End: 2025-05-16
Payer: MEDICARE

## 2025-05-19 ENCOUNTER — HOSPITAL ENCOUNTER (OUTPATIENT)
Facility: HOSPITAL | Age: 89
Setting detail: RECURRING SERIES
Discharge: HOME | End: 2025-05-22
Payer: MEDICARE

## 2025-05-19 PROCEDURE — 97535 SELF CARE MNGMENT TRAINING: CPT

## 2025-05-19 PROCEDURE — 97140 MANUAL THERAPY 1/> REGIONS: CPT

## 2025-05-19 NOTE — PROGRESS NOTES
PHYSICAL THERAPY/OCCUPATIONAL THERAPY - MEDICARE DAILY TREATMENT NOTE (updated 3/23)      Date: 2025          Patient Name:  Melissa Collins :  1934   Medical   Diagnosis:  Lymphedema, not elsewhere classified [I89.0] Treatment Diagnosis:  I89.0     Lymphedema, not elsewhere classified, R26.89   Other abnormalities of gait and mobility, and R60.9     Edema, unspecified    Referral Source:  Analisa Gutierrez MD Insurance:   Payor: MEDICARE / Plan: MEDICARE PART A AND B / Product Type: *No Product type* /                     Patient  verified yes     Visit #   Current  / Total 16 24 per POC   Time   In / Out 1048 1132   Total Treatment Time 44   Total Timed Codes 44   1:1 Treatment Time 44      [facility] Totals Reminder:  bill using total billable   min of TIMED therapeutic procedures and modalities.   8-22 min = 1 unit; 23-37 min = 2 units; 38-52 min = 3 units;  53-67 min = 4 units; 68-82 min = 5 units         SUBJECTIVE    Pain Level (0-10 scale): R groin 5/10, R posterior thigh wound 8/10 at times     Any medication changes, allergies to medications, adverse drug reactions, diagnosis change, or new procedure performed?: [x] No    [] Yes (see summary sheet for update)  Medications: Verified on Patient Summary List    Subjective functional status/changes:     Pt arrived to appt without compression on, accompanied by daughter and . Pt says she has been very upset and worried recently due to potentially moving to [facility] [place]. Pt says she has not used the pump because she worries about her  wandering off and her not being able to get to him. Pt has worn stockings most days, but admits that she still has not washed them because then it would be \"too hard\" for her  to get them on. Pt received remaining 2 pair of custom stockings, but has not worn them yet. Pt has appt with dermatologist tomorrow. Pt agreeable to treatment.    OBJECTIVE      Therapeutic Procedures:  Tx Min Billable

## 2025-05-22 ENCOUNTER — HOSPITAL ENCOUNTER (OUTPATIENT)
Facility: HOSPITAL | Age: 89
Setting detail: RECURRING SERIES
Discharge: HOME | End: 2025-05-25
Payer: MEDICARE

## 2025-05-22 PROCEDURE — 97140 MANUAL THERAPY 1/> REGIONS: CPT

## 2025-05-22 NOTE — PROGRESS NOTES
PHYSICAL THERAPY/OCCUPATIONAL THERAPY - MEDICARE DAILY TREATMENT NOTE (updated 3/23)      Date: 2025          Patient Name:  Melissa Collins :  1934   Medical   Diagnosis:  Lymphedema, not elsewhere classified [I89.0] Treatment Diagnosis:  I89.0     Lymphedema, not elsewhere classified, R26.89   Other abnormalities of gait and mobility, and R60.9     Edema, unspecified    Referral Source:  Analisa Gutierrez MD Insurance:   Payor: MEDICARE / Plan: MEDICARE PART A AND B / Product Type: *No Product type* /                     Patient  verified yes     Visit #   Current  / Total 17 24 per POC   Time   In / Out 1216 1248   Total Treatment Time 32   Total Timed Codes 32   1:1 Treatment Time 32      [facility] Totals Reminder:  bill using total billable   min of TIMED therapeutic procedures and modalities.   8-22 min = 1 unit; 23-37 min = 2 units; 38-52 min = 3 units;  53-67 min = 4 units; 68-82 min = 5 units         SUBJECTIVE    Pain Level (0-10 scale): R groin 5/10, R posterior thigh wound 8/10 at times     Any medication changes, allergies to medications, adverse drug reactions, diagnosis change, or new procedure performed?: [x] No    [] Yes (see summary sheet for update)  Medications: Verified on Patient Summary List    Subjective functional status/changes:     Pt arrived to [facility] without compression on, accompanied by daughter and . Pt says she has been very upset and worried recently due to potentially moving to [facility]. Pt says she has not consistently used the pump because she worries about her  wandering off and her not being able to get to him. Pt was able to use the pump a couple of times since last visit when daughter was over. Pt has worn stockings most days, but admits that she still has not washed them because then it would be \"too hard\" for her  to get them on. Pt received remaining 2 pair of custom stockings, but has not worn them yet. Pt says she is on

## 2025-05-22 NOTE — PROGRESS NOTES
[facility]   a part of [facility]  [address], [address]   [place] [address]   Phone: [phone]  Fax: [phone]      PHYSICAL THERAPY PROGRESS NOTE  Patient Name:  Melissa Collisn :  1934   Treatment/Medical Diagnosis: Lymphedema, not elsewhere classified [I89.0]   Referral Source:  Analisa Gutierrez MD     Date of Initial Visit:  25 Attended Visits:  17 Missed Visits:  0     SUMMARY OF TREATMENT/ASSESSMENT:  Pt presents today with continued increase in limb volumes due to decreased compliance with home compression routine. Decision made to bandage patient's legs today to help reduce swelling, pt in agreement. Will reassess next visit and determine whether she will need additional bandaging visits. Emphasized to pt/daughter the importance of consistent daily wear of compression garments and washing of stockings to help restore elasticity of garments, thus increasing their effectiveness. Once again discussed addition of nighttime garment. Pt shown sample of Tribute and Circaid Profile, however pt adamant that she does not want nighttime garment. Discussed changes to make to home program once we transition back to garments: wash/dry stockings in dryer daily, elevate legs 2x/day, perform seated remedial HEP. Discussed long-term management of chronic lymphedema. Pt will likely need daily caregiver assistance which hopefully she will acquire upon moving to [facility]. Explained risks of not managing/treating lymphedema including increased infection risk and decline in mobility. Pt/family verbalized understanding. Discussed referral to home health PT once discharged from lymphedema clinic due to ongoing concerns of declining mobility.      CURRENT STATUS/GOALS    Patient will demonstrate decreased volumetric measurements by 400 ml bilateral lower extremities, in order to reduce risk for infection, decrease feeling of limb heaviness, and increase

## 2025-05-27 ENCOUNTER — HOSPITAL ENCOUNTER (OUTPATIENT)
Facility: HOSPITAL | Age: 89
Setting detail: RECURRING SERIES
Discharge: HOME | End: 2025-05-30
Payer: MEDICARE

## 2025-05-27 PROCEDURE — 97140 MANUAL THERAPY 1/> REGIONS: CPT

## 2025-05-27 NOTE — PROGRESS NOTES
PHYSICAL THERAPY/OCCUPATIONAL THERAPY - MEDICARE DAILY TREATMENT NOTE (updated 3/23)      Date: 2025          Patient Name:  Melissa Collins :  1934   Medical   Diagnosis:  Lymphedema, not elsewhere classified [I89.0] Treatment Diagnosis:  I89.0     Lymphedema, not elsewhere classified, R26.89   Other abnormalities of gait and mobility, and R60.9     Edema, unspecified    Referral Source:  Analisa Gutierrez MD Insurance:   Payor: MEDICARE / Plan: MEDICARE PART A AND B / Product Type: *No Product type* /                     Patient  verified yes     Visit #   Current  / Total 18 24 per POC   Time   In / Out 1225 1305   Total Treatment Time 40   Total Timed Codes 40   1:1 Treatment Time 40      [facility] Totals Reminder:  bill using total billable   min of TIMED therapeutic procedures and modalities.   8-22 min = 1 unit; 23-37 min = 2 units; 38-52 min = 3 units;  53-67 min = 4 units; 68-82 min = 5 units         SUBJECTIVE    Pain Level (0-10 scale): R groin 5/10, R posterior thigh wound 8/10 at times     Any medication changes, allergies to medications, adverse drug reactions, diagnosis change, or new procedure performed?: [x] No    [] Yes (see summary sheet for update)  Medications: Verified on Patient Summary List    Subjective functional status/changes:     Pt arrived to appt with MLB removed, accompanied by daughter and . Pt reports good tolerance to bandages, says she took them off this morning to shower. Pt says she has been very upset and worried recently due to potentially moving to [facility] [place], pt hoping she will move in mid- or July. Pt says she has not consistently used the pump because she worries about her  wandering off and her not being able to get to him. Pt says she is on antibiotics for a bladder infection, had to cancel appt with dermatologist the other day to go to urologist. Pt agreeable to treatment, including bandaging her legs today due to increased

## 2025-05-30 ENCOUNTER — HOSPITAL ENCOUNTER (OUTPATIENT)
Facility: HOSPITAL | Age: 89
Setting detail: RECURRING SERIES
End: 2025-05-30
Payer: MEDICARE

## 2025-05-30 PROCEDURE — 97140 MANUAL THERAPY 1/> REGIONS: CPT

## 2025-05-30 NOTE — PROGRESS NOTES
PHYSICAL THERAPY/OCCUPATIONAL THERAPY - MEDICARE DAILY TREATMENT NOTE (updated 3/23)      Date: 2025          Patient Name:  Melissa Collins :  1934   Medical   Diagnosis:  Lymphedema, not elsewhere classified [I89.0] Treatment Diagnosis:  I89.0     Lymphedema, not elsewhere classified, R26.89   Other abnormalities of gait and mobility, and R60.9     Edema, unspecified    Referral Source:  Analisa Gutierrez MD Insurance:   Payor: MEDICARE / Plan: MEDICARE PART A AND B / Product Type: *No Product type* /                     Patient  verified yes     Visit #   Current  / Total 19 24 per POC   Time   In / Out 1228 1255   Total Treatment Time 27   Total Timed Codes 27   1:1 Treatment Time 27      [facility] Totals Reminder:  bill using total billable   min of TIMED therapeutic procedures and modalities.   8-22 min = 1 unit; 23-37 min = 2 units; 38-52 min = 3 units;  53-67 min = 4 units; 68-82 min = 5 units         SUBJECTIVE    Pain Level (0-10 scale): R groin 5/10, R posterior thigh wound 8/10 at times     Any medication changes, allergies to medications, adverse drug reactions, diagnosis change, or new procedure performed?: [x] No    [] Yes (see summary sheet for update)  Medications: Verified on Patient Summary List    Subjective functional status/changes:     Pt arrived to appt with MLB removed, accompanied by daughter. Pt reports good tolerance to bandages, says she took them off this morning to shower. Pt says she has been very upset and worried recently due to potentially moving to [facility] [place], pt hoping she will move in mid-. Pt says she has not consistently used the pump because she worries about her  wandering off and her not being able to get to him. Pt says she is on antibiotics for a bladder infection, had to cancel appt with dermatologist the other day to go to urologist. Pt agreeable to treatment, requests MLB only due to GI issues today.     OBJECTIVE      Therapeutic

## 2025-06-02 ENCOUNTER — HOSPITAL ENCOUNTER (OUTPATIENT)
Facility: HOSPITAL | Age: 89
Setting detail: RECURRING SERIES
Discharge: HOME | End: 2025-06-05
Payer: MEDICARE

## 2025-06-02 PROCEDURE — 97140 MANUAL THERAPY 1/> REGIONS: CPT

## 2025-06-02 NOTE — PROGRESS NOTES
unable to assist. Emailed  to request 2 additional pair of stockings. Reviewed garment wear schedule and precautions. Encouraged pt to elevate legs at night and wear marlys during the day paired with stockings as tolerated. Encouraged daily use of pump to help with upper leg swelling. Pt/family in agreement with plan.     Patient requires custom flat knit stockings for improved containment since a ready-made stocking would not provide adequate compression to prevent swelling into the stocking during the day. Pt has stage II lymphedema with increased swelling around the malleolar region. A custom stocking with Y measurement is necessary to prevent increased swelling around ankle.   Patient requires a top silicone band to prevent the stocking from slipping down during the day.   Patient requires closed toe stocking to provide compression to the whole foot and prevent swelling of dorsal foot/toes.    Vendor: [employer]     Education: Educated patient in compression garment donning and doffing., Glove use with donning., Daily wear schedule., Daily laundering., Garment lifespan., Return and reordering process (by bringing prior garments into the clinic)., Educated patient to monitor for redness or pressure points on the skin., and If new pain occurs they should contact their therapist.    Patient/family demonstrated donning and doffing with  pt requires assist from spouse for don/doffing garments.       10 min non-billable time spent confirming fit of new custom stockings. 15 min billable time spent reviewing garment wear schedule, don/doffing instructions, precautions, and wash instructions. Encouraged daily wear of stockings, wear of RM marlys as tolerated paired with stockings during the day, and nightly use of pump. Pt has worn velcro garments at night previously, however says these were too difficult for her  to don.    40     Total Total       Skin assessment post-treatment (if applicable):

## 2025-06-11 ENCOUNTER — HOSPITAL ENCOUNTER (OUTPATIENT)
Facility: HOSPITAL | Age: 89
Setting detail: RECURRING SERIES
Discharge: HOME | End: 2025-06-14
Payer: MEDICARE

## 2025-06-11 PROCEDURE — 97140 MANUAL THERAPY 1/> REGIONS: CPT

## 2025-06-11 NOTE — PROGRESS NOTES
patient knowledge and understanding of home injury/symptom/pain management, positioning, home safety, and activity modification  to improve patient's ability to progress to PLOF and address remaining functional goals.  (see flow sheet as applicable)    Details if applicable:     The patient was re-educated regarding the role and function of the lymphatic system, and instructed in the lymphedema management protocol of complete decongestive therapy (CDT).  This includes skin care to prevent breakdown or infection, lymphedema exercises, custom compression therapy options (bandaging, compression garments, compression pump, Farrow Wraps, JoViPak, Edison Sleeve, etc. as needed), and decongestion with manual lymphatic drainage as indicated.  We discussed the need for consistent compression system for lymphedema management.  Diaphragmatic breathing instruction and skin care education was performed, applying low pH lotion to extremity using upward strokes to stimulate lymphatic vessels.      Pt presents today with continued increase in limb volumes due to decreased compliance with home compression routine. Pt very frustrated with continued swelling though she would prefer not to return to bandaging. Pt has appt with dermatologist tomorrow. Decision made to don stockings today and will plan to bandage pt's legs next visit to help reduce swelling, pt in agreement. Emphasized to pt/daughter the importance of consistent daily wear of compression garments and washing of stockings to help restore elasticity of garments, thus increasing their effectiveness. Once again discussed addition of nighttime garment. Pt shown sample of Tribute and Circaid Profile today, however pt adamant that she does not want to sleep in garments.     Discussed changes to make to home program: wash/dry stockings in dryer daily, elevate legs 2x/day, perform seated remedial HEP. Pt not going to use pump at this time due to increased stress with pt worrying

## 2025-06-13 RX ORDER — LABETALOL 200 MG/1
200 TABLET, FILM COATED ORAL 2 TIMES DAILY
Qty: 180 TABLET | Refills: 0 | Status: SHIPPED | OUTPATIENT
Start: 2025-06-13

## 2025-06-16 ENCOUNTER — APPOINTMENT (OUTPATIENT)
Facility: HOSPITAL | Age: 89
End: 2025-06-16
Payer: MEDICARE

## 2025-06-17 ENCOUNTER — APPOINTMENT (OUTPATIENT)
Facility: HOSPITAL | Age: 89
End: 2025-06-17
Payer: MEDICARE

## 2025-06-19 ENCOUNTER — APPOINTMENT (OUTPATIENT)
Facility: HOSPITAL | Age: 89
End: 2025-06-19
Payer: MEDICARE

## 2025-06-20 ENCOUNTER — HOSPITAL ENCOUNTER (OUTPATIENT)
Facility: HOSPITAL | Age: 89
Setting detail: RECURRING SERIES
Discharge: HOME | End: 2025-06-23
Payer: MEDICARE

## 2025-06-20 PROCEDURE — 97140 MANUAL THERAPY 1/> REGIONS: CPT

## 2025-06-20 NOTE — PROGRESS NOTES
[facility]   a part of [facility]  [address], [address]   [place] [address]   Phone: [phone]  Fax: [phone]      PHYSICAL THERAPY PROGRESS NOTE  Patient Name:  Melissa Collins :  1934   Treatment/Medical Diagnosis: Lymphedema, not elsewhere classified [I89.0]   Referral Source:  Analisa Gutierrez MD     Date of Initial Visit:  25 Attended Visits:  22 Missed Visits:  0     SUMMARY OF TREATMENT/ASSESSMENT:  Pt with good response to MLB. Returned to bandaging 1 month ago due to increased limb volumes after transitioning to compression garments. Emphasized to pt/daughter the importance of consistent daily wear of compression garments and washing of stockings to help restore elasticity of garments, thus increasing their effectiveness. Once again discussed addition of nighttime garment. Pt shown sample of Tribute and Circaid Profile previous visit, however pt adamant that she does not want nighttime garment. Discussed changes to make to home program once we transition back to garments: wash/dry stockings in dryer daily, elevate legs 2x/day, perform seated remedial HEP. Discussed long-term management of chronic lymphedema. Pt will likely need daily caregiver assistance which hopefully she will acquire upon moving to [facility]. Explained risks of not managing/treating lymphedema including increased infection risk and decline in mobility. Pt/family verbalized understanding. Discussed referral to home health PT once discharged from lymphedema clinic due to ongoing concerns of declining mobility.      CURRENT STATUS/GOALS    Patient will demonstrate decreased volumetric measurements by 400 ml bilateral lower extremities, in order to reduce risk for infection, decrease feeling of limb heaviness, and increase independence/tolerance for ADL completion within 6 weeks.   Status at last Eval/Progress Note: not met   Current Status: ongoing   Goal Met?  no    2.

## 2025-06-20 NOTE — PROGRESS NOTES
PHYSICAL THERAPY/OCCUPATIONAL THERAPY - MEDICARE DAILY TREATMENT NOTE (updated 3/23)      Date: 2025          Patient Name:  Melissa Collins :  1934   Medical   Diagnosis:  Lymphedema, not elsewhere classified [I89.0] Treatment Diagnosis:  I89.0     Lymphedema, not elsewhere classified, R26.89   Other abnormalities of gait and mobility, and R60.9     Edema, unspecified    Referral Source:  Analisa Gutierrez MD Insurance:   Payor: MEDICARE / Plan: MEDICARE PART A AND B / Product Type: *No Product type* /                     Patient  verified yes     Visit #   Current  / Total 22 24 per POC   Time   In / Out 1055 1135   Total Treatment Time 40   Total Timed Codes 40   1:1 Treatment Time 40      [facility] Totals Reminder:  bill using total billable   min of TIMED therapeutic procedures and modalities.   8-22 min = 1 unit; 23-37 min = 2 units; 38-52 min = 3 units;  53-67 min = 4 units; 68-82 min = 5 units         SUBJECTIVE    Pain Level (0-10 scale): R groin 5/10    Any medication changes, allergies to medications, adverse drug reactions, diagnosis change, or new procedure performed?: [x] No    [] Yes (see summary sheet for update)  Medications: Verified on Patient Summary List    Subjective functional status/changes:     Pt arrived to appt with MLB removed, accompanied by daughter and . Pt reports good tolerance to bandages, says she has stayed wrapped since last visit and removed this morning to shower. Pt received results of biopsy and was told that wound on right posterior thigh is melanoma. MD is going to debride it. Pt has now moved into [facility]. Pt agreeable to treatment, requests to bandage her legs again today and will bring garments next visit to determine if she can transition back to her compression stockings.     OBJECTIVE      Therapeutic Procedures:  Tx Min Billable or 1:1 Min (if diff from Tx Min) Procedure, Rationale, Specifics   NP  37090 Self Care/Home Management (timed):

## 2025-06-23 ENCOUNTER — APPOINTMENT (OUTPATIENT)
Facility: HOSPITAL | Age: 89
End: 2025-06-23
Payer: MEDICARE

## 2025-06-25 ENCOUNTER — TELEPHONE (OUTPATIENT)
Facility: CLINIC | Age: 89
End: 2025-06-25

## 2025-06-25 NOTE — TELEPHONE ENCOUNTER
Returned call but was unable to reach [facility]. Left message to return call and to speak to Brittany.

## 2025-06-25 NOTE — TELEPHONE ENCOUNTER
Juanita called from [facility] to discuss and clarify the patient medication due to her taking two at one time. She  is requesting a call back to discuss   Phone: [phone]    dilTIAZem (CARDIZEM CD) 240 MG extended release capsule     labetalol (NORMODYNE) 200 MG tablet

## 2025-06-27 ENCOUNTER — TELEPHONE (OUTPATIENT)
Facility: CLINIC | Age: 89
End: 2025-06-27

## 2025-06-27 NOTE — TELEPHONE ENCOUNTER
Cody called from [facility] and stated that the fax for the patient PT / OT order did not come through. Please refax the order to the number below:   Fax: [phone]

## 2025-07-01 ENCOUNTER — TELEPHONE (OUTPATIENT)
Facility: CLINIC | Age: 89
End: 2025-07-01

## 2025-07-01 NOTE — TELEPHONE ENCOUNTER
Juanita called from [facility] requesting an order be placed for Melissa to receive OT/PT services at their facility. Please fax to [phone]. Thank you!

## 2025-07-02 NOTE — TELEPHONE ENCOUNTER
PT/OT order (for 5x a week for 8 weeks due to increased falls, weakness and unsteady gait) faxed over to [facility]

## 2025-07-07 ENCOUNTER — HOSPITAL ENCOUNTER (OUTPATIENT)
Facility: HOSPITAL | Age: 89
Setting detail: RECURRING SERIES
End: 2025-07-07
Payer: MEDICARE

## 2025-07-08 ENCOUNTER — TELEPHONE (OUTPATIENT)
Facility: CLINIC | Age: 89
End: 2025-07-08

## 2025-07-08 NOTE — TELEPHONE ENCOUNTER
Nereida called from [facility] to inform the provider that the patient Dermatologist Dr. Cannon stated that she found a cancerous lesion on the back of the patient right leg so she would like for the provider to prescribe fluid pills. The Dermatologist  practice name was not mention.  Psr advised that the patient should schedule an appoinment to discuss new medication

## 2025-07-09 NOTE — TELEPHONE ENCOUNTER
Attempted to return call to Dermatology, but was only able to get triage voicemail line for their office. Phone number [phone]    Left detailed message to return call regarding patient's lesion.

## 2025-07-09 NOTE — TELEPHONE ENCOUNTER
Can we please figure out what the dermatologist is asking? Not sure why they want a diuretic- we may need to call the actual dermatologist

## 2025-07-10 ENCOUNTER — HOSPITAL ENCOUNTER (OUTPATIENT)
Facility: HOSPITAL | Age: 89
Setting detail: RECURRING SERIES
Discharge: HOME | End: 2025-07-13
Payer: MEDICARE

## 2025-07-10 PROCEDURE — 97140 MANUAL THERAPY 1/> REGIONS: CPT

## 2025-07-10 NOTE — TELEPHONE ENCOUNTER
Called a second time to Dr. Cannon's office to follow up with clinical staff regarding cancerous skin lesion. Per Nurse, patient had a biopsy done last month that showed squamous cell carcinoma on the right leg. The nurse stated chart note has not been signed yet, but will fax over the biopsy results for Dr. Nunn to review.     Dr. Cannon is looking to have patient undergo Mohs Surgery, but with a secondary diagnosis of lymphedema, is recommending patient take diuretics to help with the leg swelling, that way patient can have a smooth surgery and recovery time.  The physician would like Dr Nunn to prescribe that medication for maintenance purposes.

## 2025-07-10 NOTE — PROGRESS NOTES
PHYSICAL THERAPY/OCCUPATIONAL THERAPY - MEDICARE DAILY TREATMENT NOTE (updated 3/23)      Date: 7/10/2025          Patient Name:  Melissa Collins :  1934   Medical   Diagnosis:  Lymphedema, not elsewhere classified [I89.0] Treatment Diagnosis:  I89.0     Lymphedema, not elsewhere classified, R26.89   Other abnormalities of gait and mobility, and R60.9     Edema, unspecified    Referral Source:  Analisa Gutierrez MD Insurance:   Payor: MEDICARE / Plan: MEDICARE PART A AND B / Product Type: *No Product type* /                     Patient  verified yes     Visit #   Current  / Total 23 24 per POC   Time   In / Out 1220 1310   Total Treatment Time 50   Total Timed Codes 50   1:1 Treatment Time 50       BC Totals Reminder:  bill using total billable   min of TIMED therapeutic procedures and modalities.   8-22 min = 1 unit; 23-37 min = 2 units; 38-52 min = 3 units;  53-67 min = 4 units; 68-82 min = 5 units         SUBJECTIVE    Pain Level (0-10 scale): R groin 5/10    Any medication changes, allergies to medications, adverse drug reactions, diagnosis change, or new procedure performed?: [x] No    [] Yes (see summary sheet for update)  Medications: Verified on Patient Summary List    Subjective functional status/changes:     Pt arrived to appt with MLB removed, accompanied by daughter and . Pt says she removed bandages 2 weeks ago and has been wearing custom stockings daily. Pt says staff at [facility] are helping her don/doff stockings, but notes increased swelling. Pt received results of biopsy and was told that wound on right posterior thigh is squamous cell carcinoma. Per notes, dermatologist wants to do Mohs procedure but they are concerned about her lymphedema. Dermatologist has asked her PCP to prescribe diuretic to help with fluid, not yet started. Pt agreeable to treatment, requests to bandage her legs again today.     OBJECTIVE      Therapeutic Procedures:  Tx Min Billable or 1:1 Min (if diff from

## 2025-07-10 NOTE — TELEPHONE ENCOUNTER
We can try lasix 20 mg every other day but it can cause urinary frequency and may also cause her BP to drop. We would need to check her potassium and kidney function after taking it for a week or two. Can you call the patient regarding this? Maybe it might be best for her to do a virtual visit with me or see me?

## 2025-07-16 ENCOUNTER — HOSPITAL ENCOUNTER (OUTPATIENT)
Facility: HOSPITAL | Age: 89
Setting detail: RECURRING SERIES
Discharge: HOME | End: 2025-07-19
Payer: MEDICARE

## 2025-07-16 PROCEDURE — 97140 MANUAL THERAPY 1/> REGIONS: CPT

## 2025-07-16 NOTE — THERAPY RECERTIFICATION
treatments:      Assessments/Recommendations for Continuation of Care: resume garment wear next 1-2 visits    If you have any questions/comments please contact us directly at [phone] .   Thank you for allowing us to assist in the care of your patient.    Certification Period: 7/16/2025 - 10/14/25      Jennifer Arriola, PT, DPT, CLT-LINO       7/16/2025       1:29 PM      ___ I have read the above report and request that my patient continue as recommended.   ___ I have read the above report and request that my patient continue therapy with the following changes/special instructions: ________________________________________________   ___ I have read the above report and request that my patient be discharged from therapy.     Physician's Signature:_________________________   DATE:_________   TIME:________                           Analisa Gutierrez MD    ** Signature, Date and Time must be completed for valid certification **  Please sign and fax to [phone].  Thank you

## 2025-07-16 NOTE — PROGRESS NOTES
PHYSICAL THERAPY/OCCUPATIONAL THERAPY - MEDICARE DAILY TREATMENT NOTE (updated 3/23)      Date: 2025          Patient Name:  Melissa Collins :  1934   Medical   Diagnosis:  Lymphedema, not elsewhere classified [I89.0] Treatment Diagnosis:  I89.0     Lymphedema, not elsewhere classified, R26.89   Other abnormalities of gait and mobility, and R60.9     Edema, unspecified    Referral Source:  Analisa Gutierrez MD Insurance:   Payor: MEDICARE / Plan: MEDICARE PART A AND B / Product Type: *No Product type* /                     Patient  verified yes     Visit #   Current  / Total 24 24 per POC   Time   In / Out 1228 1302   Total Treatment Time 34   Total Timed Codes 34   1:1 Treatment Time 34       BC Totals Reminder:  bill using total billable   min of TIMED therapeutic procedures and modalities.   8-22 min = 1 unit; 23-37 min = 2 units; 38-52 min = 3 units;  53-67 min = 4 units; 68-82 min = 5 units         SUBJECTIVE    Pain Level (0-10 scale): R groin 5/10    Any medication changes, allergies to medications, adverse drug reactions, diagnosis change, or new procedure performed?: [x] No    [] Yes (see summary sheet for update)  Medications: Verified on Patient Summary List    Subjective functional status/changes:     Pt arrived to appt with MLB removed, accompanied by daughter and . Pt says she removed bandages 2 days ago and has been wearing custom stockings daily. Pt says staff at [facility] are helping her don/doff stockings. Pt received results of biopsy and was told that wound on right posterior thigh is squamous cell carcinoma. Per notes, dermatologist wants to do Mohs procedure but they are concerned about her lymphedema. Dermatologist has asked her PCP to prescribe diuretic to help with fluid, not yet started. Pt says Mohs surgery is scheduled for August and dermatologist wants \"as much fluid off as possible.\" Pt agreeable to treatment, requests to bandage her legs again today.

## 2025-07-18 ENCOUNTER — APPOINTMENT (OUTPATIENT)
Facility: HOSPITAL | Age: 89
End: 2025-07-18
Payer: MEDICARE

## 2025-07-21 ENCOUNTER — HOSPITAL ENCOUNTER (OUTPATIENT)
Facility: HOSPITAL | Age: 89
Setting detail: RECURRING SERIES
Discharge: HOME | End: 2025-07-24
Payer: MEDICARE

## 2025-07-21 PROCEDURE — 97535 SELF CARE MNGMENT TRAINING: CPT

## 2025-07-21 PROCEDURE — 97140 MANUAL THERAPY 1/> REGIONS: CPT

## 2025-07-21 NOTE — PROGRESS NOTES
managing her lymphedema. Will plan to check volumes next visit to determine effectiveness of daily use of pump and stockings. Pt able to don pt's custom stockings without difficulty. Discussed proper donning instructions for pt/family to relay to staff at [facility].      18  61834 Manual Therapy (timed):  decrease pain, increase ROM, increase tissue extensibility, and decrease edema to improve patient's ability to progress to PLOF and address remaining functional goals.  The manual therapy interventions were performed at a separate and distinct time from the therapeutic activities interventions . (see flow sheet as applicable)    Details if applicable:      Volumes:  Date Right (mL) Left (mL) %   Difference   7/21/25 10,630.41 9,842.0 8.01   7/10/25 10,921.83 10,038.13 8.8   5/19/25 10,651.14  8,568.5 24.31   5/7/25 10,044.64 8,355.6 20.21   3/31/25 8,319.48 7,358.05 13.07   2/24/25 8,738.78  7,689.0 13.65   1/28/25 8,774.55 7,282.84 20.48   9/10/24 7,325.81 6,124.27 19.62   4/24/24 9,160.28 7,767.59 15.2   4/5/24 9,415.59 7,911.96 15.97   3/25/24 10,360.61 8,755.98 15.49   3/21/24 9,712.44 8,395.31 13.56   3/4/24 10,537.83 9,151.00 13.16   1/18/2024 10,522.50 9535.39 9.38   10/26/23 10,669.42 10,544.63 1.18      Manual Lymphatic Drainage (MLD):  Area to decongest: LE Bilateral, foot, ankle, calf, and mid knee   Sequence used and effectiveness: deferred  Modifications were made to manual lymph drainage sequence to exclude cervical techniques secondary to patient's age. and Secondary sequence for lower extremities without trunk involvement.  Began and ended sequence with diaphragmatic breaths x5, stimulated inguinal nodes, extra time spent on medial thigh and distal lower legs.      Skin/wound care/debridement: Reviewed skin care principles:   Performed skin care with low pH lotion following manual lymph principles.    Applied Eucerin lotion to lower legs. Ammonium lactate to anterior lower legs in areas of flaky skin.

## 2025-07-24 ENCOUNTER — APPOINTMENT (OUTPATIENT)
Facility: HOSPITAL | Age: 89
End: 2025-07-24
Payer: MEDICARE

## 2025-07-28 ENCOUNTER — APPOINTMENT (OUTPATIENT)
Facility: HOSPITAL | Age: 89
End: 2025-07-28
Payer: MEDICARE

## 2025-07-31 ENCOUNTER — APPOINTMENT (OUTPATIENT)
Facility: HOSPITAL | Age: 89
End: 2025-07-31
Payer: MEDICARE

## 2025-08-27 ENCOUNTER — TRANSCRIBE ORDERS (OUTPATIENT)
Facility: HOSPITAL | Age: 89
End: 2025-08-27

## 2025-08-27 ENCOUNTER — HOSPITAL ENCOUNTER (OUTPATIENT)
Facility: HOSPITAL | Age: 89
Discharge: HOME | End: 2025-08-30
Payer: MEDICARE

## 2025-08-27 DIAGNOSIS — K59.00 CONSTIPATION, UNSPECIFIED CONSTIPATION TYPE: Primary | ICD-10-CM

## 2025-08-27 DIAGNOSIS — K59.00 CONSTIPATION, UNSPECIFIED CONSTIPATION TYPE: ICD-10-CM

## 2025-08-27 PROCEDURE — 74018 RADEX ABDOMEN 1 VIEW: CPT

## (undated) DEVICE — BLADE CLIPPER GEN PURP NS

## (undated) DEVICE — Device

## (undated) DEVICE — BINDER ABD M/L H12IN FOR 46-62IN WHT 4 SLD PNL DSGN HOOP

## (undated) DEVICE — SUTURE PDS II SZ 2-0 L27IN ABSRB VLT L26MM CT-2 1/2 CIR Z333H

## (undated) DEVICE — GLOVE SURG SZ 65 THK91MIL LTX FREE SYN POLYISOPRENE

## (undated) DEVICE — BAG BELONG PT PERS CLEAR HANDL

## (undated) DEVICE — 4-PORT MANIFOLD: Brand: NEPTUNE 2

## (undated) DEVICE — SEALER ENDOSCP NANO COAT OPN DIV CRV L JAW LIGASURE IMPACT

## (undated) DEVICE — SOLUTION IRRIG 500ML 0.9% SOD CHLO USP POUR PLAS BTL

## (undated) DEVICE — 3M™ CUROS™ DISINFECTING CAP FOR NEEDLELESS CONNECTORS 270/CARTON 20 CARTONS/CASE CFF1-270: Brand: CUROS™

## (undated) DEVICE — 1200 GUARD II KIT W/5MM TUBE W/O VAC TUBE: Brand: GUARDIAN

## (undated) DEVICE — GLOVE ORANGE PI 7   MSG9070

## (undated) DEVICE — CATH IV AUTOGRD BC PNK 20GA 25 -- INSYTE

## (undated) DEVICE — STAPLER INT L75MM CUT LN L73MM STPL LN L77MM BLU B FRM 8

## (undated) DEVICE — BLADE,CARBON-STEEL,15,STRL,DISPOSABLE,TB: Brand: MEDLINE

## (undated) DEVICE — ELECTRODE,RADIOTRANSLUCENT,FOAM,3PK: Brand: MEDLINE

## (undated) DEVICE — CYSTO-SFMC: Brand: MEDLINE INDUSTRIES, INC.

## (undated) DEVICE — SYR ASSEMB INFL BLLN 60ML --

## (undated) DEVICE — BITEBLOCK ENDOSCP 60FR MAXI WHT POLYETH STURDY W/ VELC WVN

## (undated) DEVICE — PAD,NON-ADHERENT,3X8,STERILE,LF,1/PK: Brand: MEDLINE

## (undated) DEVICE — SUTURE VCRL SZ 0 L36IN ABSRB UD L40MM CT 1/2 CIR TAPERPOINT J958H

## (undated) DEVICE — SOLIDIFIER MEDC 1200ML -- CONVERT TO 356117

## (undated) DEVICE — SUTURE PERMAHAND SZ 3-0 L18IN NONABSORBABLE BLK L26MM SH C013D

## (undated) DEVICE — ELECTRODE PT RET AD L9FT HI MOIST COND ADH HYDRGEL CORDED

## (undated) DEVICE — KIT COLON W/ 1.1OZ LUB AND 2 END

## (undated) DEVICE — LAPAROTOMY-SFMC: Brand: MEDLINE INDUSTRIES, INC.

## (undated) DEVICE — SUTURE PDS II SZ 0 L36IN ABSRB VLT L40MM CT 1/2 CIR Z358T

## (undated) DEVICE — SKN PREP SPNG STKS PVP PNT STR: Brand: MEDLINE INDUSTRIES, INC.

## (undated) DEVICE — SET GRAV CK VLV NEEDLESS ST 3 GANGED 4WAY STPCOCK HI FLO 10

## (undated) DEVICE — PENCIL ES CRD L10FT HND SWCHING ROCK SWCH W/ EDGE COAT BLDE

## (undated) DEVICE — BLADE,CARBON-STEEL,10,STRL,DISPOSABLE,TB: Brand: MEDLINE

## (undated) DEVICE — GOWN,SIRUS,FABRNF,XL,20/CS: Brand: MEDLINE

## (undated) DEVICE — ESOPHAGEAL BALLOON DILATATION CATHETER: Brand: CRE FIXED WIRE

## (undated) DEVICE — ADULT SPO2 SENSOR: Brand: NELLCOR

## (undated) DEVICE — 1LYRTR 16FR10ML100%SILTMPS SNP: Brand: MEDLINE INDUSTRIES, INC.

## (undated) DEVICE — SOLUTION IRRIG 1000ML STRL H2O USP PLAS POUR BTL

## (undated) DEVICE — HYPODERMIC SAFETY NEEDLE: Brand: MAGELLAN

## (undated) DEVICE — CANN NASAL O2 CAPNOGRAPHY AD -- FILTERLINE

## (undated) DEVICE — TUBING, SUCTION, 1/4" X 12', STRAIGHT: Brand: MEDLINE

## (undated) DEVICE — SOLUTION IRRIGATION H2O 0797305] ICU MEDICAL INC]

## (undated) DEVICE — SUTURE VCRL SZ 3-0 L27IN ABSRB UD L26MM SH 1/2 CIR J416H

## (undated) DEVICE — TOWEL SURG W17XL27IN STD BLU COT NONFENESTRATED PREWASHED

## (undated) DEVICE — SUTURE PERMAHAND SZ 3-0 L30IN NONABSORBABLE BLK SH L26MM K832H

## (undated) DEVICE — SYRINGE MED 10ML LUERLOCK TIP W/O SFTY DISP

## (undated) DEVICE — BLUNTFILL: Brand: MONOJECT

## (undated) DEVICE — 3M™ IOBAN™ 2 ANTIMICROBIAL INCISE DRAPE 6650EZ: Brand: IOBAN™ 2

## (undated) DEVICE — DRESSING BORDERED ADH GZ UNIV GEN USE 8INX4IN AND 6INX2IN

## (undated) DEVICE — SUTURE VCRL SZ 4-0 L27IN ABSRB UD L19MM PS-2 3/8 CIR PRIM J426H

## (undated) DEVICE — GLOVE ORANGE PI 7 1/2   MSG9075

## (undated) DEVICE — CORD ES L10FT BLU MPLR FT SWCH DISP ACMI

## (undated) DEVICE — RELOAD STPL L75MM OPN H3.8MM CLS 1.5MM WIRE DIA0.2MM REG

## (undated) DEVICE — SUTURE V-LOC 90 3-0 L9IN ABSRB VLT L26MM V-20 1/2 CIR TAPR VLOCM0644